# Patient Record
Sex: FEMALE | Race: WHITE | NOT HISPANIC OR LATINO | Employment: FULL TIME | URBAN - METROPOLITAN AREA
[De-identification: names, ages, dates, MRNs, and addresses within clinical notes are randomized per-mention and may not be internally consistent; named-entity substitution may affect disease eponyms.]

---

## 2017-01-10 ENCOUNTER — LAB REQUISITION (OUTPATIENT)
Dept: LAB | Facility: HOSPITAL | Age: 52
End: 2017-01-10
Payer: COMMERCIAL

## 2017-01-10 DIAGNOSIS — J32.8 OTHER CHRONIC SINUSITIS: ICD-10-CM

## 2017-01-10 PROCEDURE — 87205 SMEAR GRAM STAIN: CPT | Performed by: PHYSICIAN ASSISTANT

## 2017-01-10 PROCEDURE — 87070 CULTURE OTHR SPECIMN AEROBIC: CPT | Performed by: PHYSICIAN ASSISTANT

## 2017-01-13 LAB
BACTERIA WND AEROBE CULT: NORMAL
GRAM STN SPEC: NORMAL

## 2018-02-01 ENCOUNTER — OFFICE VISIT (OUTPATIENT)
Dept: INTERNAL MEDICINE CLINIC | Age: 53
End: 2018-02-01
Payer: COMMERCIAL

## 2018-02-01 VITALS
DIASTOLIC BLOOD PRESSURE: 110 MMHG | SYSTOLIC BLOOD PRESSURE: 188 MMHG | HEIGHT: 63 IN | TEMPERATURE: 98.3 F | HEART RATE: 59 BPM | BODY MASS INDEX: 25.48 KG/M2 | OXYGEN SATURATION: 98 % | WEIGHT: 143.8 LBS

## 2018-02-01 DIAGNOSIS — Z12.39 ENCOUNTER FOR OTHER SCREENING FOR MALIGNANT NEOPLASM OF BREAST: ICD-10-CM

## 2018-02-01 DIAGNOSIS — J01.11 ACUTE RECURRENT FRONTAL SINUSITIS: Primary | ICD-10-CM

## 2018-02-01 DIAGNOSIS — I10 HTN (HYPERTENSION), BENIGN: ICD-10-CM

## 2018-02-01 DIAGNOSIS — N76.0 ACUTE VAGINITIS: ICD-10-CM

## 2018-02-01 DIAGNOSIS — Z12.11 ENCOUNTER FOR SCREENING FOR MALIGNANT NEOPLASM OF COLON: ICD-10-CM

## 2018-02-01 DIAGNOSIS — E06.3 HYPOTHYROIDISM DUE TO HASHIMOTO'S THYROIDITIS: ICD-10-CM

## 2018-02-01 DIAGNOSIS — E03.8 HYPOTHYROIDISM DUE TO HASHIMOTO'S THYROIDITIS: ICD-10-CM

## 2018-02-01 DIAGNOSIS — J30.2 CHRONIC SEASONAL ALLERGIC RHINITIS, UNSPECIFIED TRIGGER: ICD-10-CM

## 2018-02-01 PROBLEM — N95.0 POSTMENOPAUSAL BLEEDING: Status: ACTIVE | Noted: 2017-09-20

## 2018-02-01 PROCEDURE — 99204 OFFICE O/P NEW MOD 45 MIN: CPT | Performed by: PHYSICIAN ASSISTANT

## 2018-02-01 RX ORDER — CETIRIZINE HYDROCHLORIDE 10 MG/1
10 TABLET ORAL DAILY
Refills: 0
Start: 2018-02-01 | End: 2019-02-04 | Stop reason: SDUPTHER

## 2018-02-01 RX ORDER — LISINOPRIL 10 MG/1
10 TABLET ORAL DAILY
Qty: 30 TABLET | Refills: 1 | Status: SHIPPED | OUTPATIENT
Start: 2018-02-01 | End: 2018-02-12 | Stop reason: SDUPTHER

## 2018-02-01 RX ORDER — LEVOTHYROXINE SODIUM 0.1 MG/1
100 TABLET ORAL DAILY
Qty: 30 TABLET | Refills: 0 | Status: SHIPPED | OUTPATIENT
Start: 2018-02-01 | End: 2018-03-01 | Stop reason: SDUPTHER

## 2018-02-01 RX ORDER — LEVOTHYROXINE SODIUM 0.1 MG/1
100 TABLET ORAL DAILY
COMMUNITY
Start: 2017-07-18 | End: 2018-02-01 | Stop reason: SDUPTHER

## 2018-02-01 RX ORDER — CLINDAMYCIN PHOSPHATE 10 MG/G
GEL TOPICAL AS NEEDED
COMMUNITY
Start: 2015-07-30 | End: 2021-10-28

## 2018-02-01 RX ORDER — FLUCONAZOLE 150 MG/1
150 TABLET ORAL ONCE
Qty: 2 TABLET | Refills: 0 | Status: SHIPPED | OUTPATIENT
Start: 2018-02-01 | End: 2018-02-01

## 2018-02-01 RX ORDER — ALBUTEROL SULFATE 90 UG/1
2 AEROSOL, METERED RESPIRATORY (INHALATION) AS NEEDED
COMMUNITY
Start: 2018-01-12 | End: 2019-01-12

## 2018-02-01 RX ORDER — FLUTICASONE PROPIONATE 50 MCG
2 SPRAY, SUSPENSION (ML) NASAL AS NEEDED
COMMUNITY
Start: 2018-01-12 | End: 2022-04-19 | Stop reason: SDUPTHER

## 2018-02-01 RX ORDER — SPIRONOLACTONE 25 MG/1
1 TABLET ORAL DAILY
COMMUNITY
Start: 2014-08-06 | End: 2018-05-22 | Stop reason: ALTCHOICE

## 2018-02-01 RX ORDER — SULFAMETHOXAZOLE AND TRIMETHOPRIM 800; 160 MG/1; MG/1
1 TABLET ORAL EVERY 12 HOURS SCHEDULED
Qty: 28 TABLET | Refills: 0 | Status: SHIPPED | OUTPATIENT
Start: 2018-02-01 | End: 2018-02-15

## 2018-02-01 NOTE — PATIENT INSTRUCTIONS
Monitor BP at home - start lisinopril  Labs asap (fasting)  Pt instructed to report any headaches, dizziness, CP/SOB      Sinusitis   AMBULATORY CARE:   Sinusitis  is inflammation or infection of your sinuses  It is most often caused by a virus  Acute sinusitis may last up to 12 weeks  Chronic sinusitis lasts longer than 12 weeks  Recurrent sinusitis means you have 4 or more times in 1 year  Common symptoms include the following:   · Fever    · Pain, pressure, redness, or swelling around the forehead, cheeks, or eyes    · Thick yellow or green discharge from your nose    · Tenderness when you touch your face over your sinuses    · Dry cough that happens mostly at night or when you lie down    · Headache and face pain that is worse when you lean forward    · Tooth pain, or pain when you chew  Seek care immediately if:   · Your eye and eyelid are red, swollen, and painful  · You cannot open your eye  · You have vision changes, such as double vision  · Your eyeball bulges out or you cannot move your eye  · You are more sleepy than normal, or you notice changes in your ability to think, move, or talk  · You have a stiff neck, a fever, or a bad headache  · You have swelling of your forehead or scalp  Contact your healthcare provider if:   · Your symptoms do not improve after 3 days  · Your symptoms do not go away after 10 days  · You have nausea and are vomiting  · Your nose is bleeding  · You have questions or concerns about your condition or care  Treatment for sinusitis:  Your symptoms may go away on their own  Your healthcare provider may recommend watchful waiting for up to 10 days before starting antibiotics  You may  need any of the following:  · Acetaminophen  decreases pain and fever  It is available without a doctor's order  Ask how much to take and how often to take it  Follow directions   Read the labels of all other medicines you are using to see if they also contain acetaminophen, or ask your doctor or pharmacist  Acetaminophen can cause liver damage if not taken correctly  Do not use more than 4 grams (4,000 milligrams) total of acetaminophen in one day  · NSAIDs , such as ibuprofen, help decrease swelling, pain, and fever  This medicine is available with or without a doctor's order  NSAIDs can cause stomach bleeding or kidney problems in certain people  If you take blood thinner medicine, always ask your healthcare provider if NSAIDs are safe for you  Always read the medicine label and follow directions  · Nasal steroid sprays  may help decrease inflammation in your nose and sinuses  · Decongestants  help reduce swelling and drain mucus in the nose and sinuses  They may help you breathe easier  · Antihistamines  help dry mucus in the nose and relieve sneezing  · Antibiotics  help treat or prevent a bacterial infection  · Take your medicine as directed  Contact your healthcare provider if you think your medicine is not helping or if you have side effects  Tell him or her if you are allergic to any medicine  Keep a list of the medicines, vitamins, and herbs you take  Include the amounts, and when and why you take them  Bring the list or the pill bottles to follow-up visits  Carry your medicine list with you in case of an emergency  Self-care:   · Rinse your sinuses  Use a sinus rinse device to rinse your nasal passages with a saline (salt water) solution or distilled water  Do not use tap water  This will help thin the mucus in your nose and rinse away pollen and dirt  It will also help reduce swelling so you can breathe normally  Ask your healthcare provider how often to do this  · Breathe in steam   Heat a bowl of water until you see steam  Lean over the bowl and make a tent over your head with a large towel  Breathe deeply for about 20 minutes  Be careful not to get too close to the steam or burn yourself  Do this 3 times a day   You can also breathe deeply when you take a hot shower  · Sleep with your head elevated  Place an extra pillow under your head before you go to sleep to help your sinuses drain  · Drink liquids as directed  Ask your healthcare provider how much liquid to drink each day and which liquids are best for you  Liquids will thin the mucus in your nose and help it drain  Avoid drinks that contain alcohol or caffeine  · Do not smoke, and avoid secondhand smoke  Nicotine and other chemicals in cigarettes and cigars can make your symptoms worse  Ask your healthcare provider for information if you currently smoke and need help to quit  E-cigarettes or smokeless tobacco still contain nicotine  Talk to your healthcare provider before you use these products  Prevent the spread of germs that cause sinusitis:  Wash your hands often with soap and water  Wash your hands after you use the bathroom, change a child's diaper, or sneeze  Wash your hands before you prepare or eat food  Follow up with your healthcare provider as directed: You may be referred to an ear, nose, and throat specialist  Write down your questions so you remember to ask them during your visits  © 2017 [employer]  Information is for End User's use only and may not be sold, redistributed or otherwise used for commercial purposes  All illustrations and images included in CareNotes® are the copyrighted property of A [employer]  or [employer]  The above information is an  only  It is not intended as medical advice for individual conditions or treatments  Talk to your doctor, nurse or pharmacist before following any medical regimen to see if it is safe and effective for you

## 2018-02-01 NOTE — PROGRESS NOTES
Assessment/Plan:         Diagnoses and all orders for this visit:    Acute recurrent frontal sinusitis  -     sulfamethoxazole-trimethoprim (BACTRIM DS) 800-160 mg per tablet; Take 1 tablet by mouth every 12 (twelve) hours for 14 days  -     CBC and differential; Future  -     CBC and differential    Encounter for screening for malignant neoplasm of colon  -     Ambulatory referral to Colorectal Surgery; Future    Encounter for other screening for malignant neoplasm of breast  -     Mammo screening bilateral w cad; Future    HTN (hypertension), benign  -     lisinopril (ZESTRIL) 10 mg tablet; Take 1 tablet (10 mg total) by mouth daily  -     Comprehensive metabolic panel; Future  -     Lipid panel; Future  -     Cortisol Level, AM Specimen; Future  -     Comprehensive metabolic panel  -     Lipid panel  -     Cortisol Level, AM Specimen    Hypothyroidism due to Hashimoto's thyroiditis  -     levothyroxine 100 mcg tablet; Take 1 tablet (100 mcg total) by mouth daily  -     TSH, 3rd generation; Future  -     TSH, 3rd generation    Acute vaginitis  -     fluconazole (DIFLUCAN) 150 mg tablet; Take 1 tablet (150 mg total) by mouth once for 1 dose    Chronic seasonal allergic rhinitis, unspecified trigger  -     cetirizine (ZyrTEC) 10 mg tablet; Take 1 tablet (10 mg total) by mouth daily    Other orders  -     albuterol (PROVENTIL HFA,VENTOLIN HFA) 90 mcg/act inhaler; Inhale 2 puffs every 6 (six) hours  -     fluticasone (FLONASE) 50 mcg/act nasal spray; 2 sprays into each nostril as needed  -     spironolactone (ALDACTONE) 25 mg tablet; Take 1 tablet by mouth daily  -     Discontinue: levothyroxine 100 mcg tablet; Take 100 mcg by mouth daily  -     clindamycin (CLINDAGEL) 1 % gel; Apply topically as needed  -     tretinoin (RETIN-A) 0 025 % cream; as needed  -     Azelastine HCl (ASTELIN NA); as needed          Subjective:      Patient ID: Peace Landa is a 48 y o  female      Pt has been having problems with elevated BP for some time - very high today  At her last ov with her previous pcp she reports her bp was around 140/90  She has not been on anything for BP control in past however has been on aldactone for years for cystic acne which is prescribed by her dermatologist  Last labs available were in April 2017 and lytes and creatinine were within normal limits  Pt states she drinks 1/2 caf coffee and tries to avoid salt in diet  She does smoke and is aware that she needs to quit to help her bp control and also prevent lung problems in the future      C/o sinus pressure - frontal headaches, ongoing x 1 month - worse over the past 2 days  Pt was told in past not to take decongestants due to high BP   Took mucinex D with some relief   Pt reports she had a ct sinuses in past at [facility] but we don't have this record - we will call for this today   She is tearful talking about this - states she was referred to neurology and or allergist in past but didn't know who to go to so she never went  She has been using flonase NS but has never taken claritin or zyrtec for maintenance meds     Hx of hypothyroid - pt has been on synthroid 100mcg daily for at least the past 6 months however she is taking this in am with her other medications  Last tsh resulted by obgyn was 2 4 (9/17)          The following portions of the patient's history were reviewed and updated as appropriate: allergies, current medications, past family history, past medical history, past social history, past surgical history and problem list     Review of Systems      Past Medical History:   Diagnosis Date    Asthma     Hypertension     Migraine headache     Thyroid disease          Current Outpatient Prescriptions:     Azelastine HCl (ASTELIN NA), as needed, Disp: , Rfl:     clindamycin (CLINDAGEL) 1 % gel, Apply topically as needed, Disp: , Rfl:     fluticasone (FLONASE) 50 mcg/act nasal spray, 2 sprays into each nostril as needed, Disp: , Rfl:    levothyroxine 100 mcg tablet, Take 1 tablet (100 mcg total) by mouth daily, Disp: 30 tablet, Rfl: 0    spironolactone (ALDACTONE) 25 mg tablet, Take 1 tablet by mouth daily, Disp: , Rfl:     tretinoin (RETIN-A) 0 025 % cream, as needed, Disp: , Rfl:     albuterol (PROVENTIL HFA,VENTOLIN HFA) 90 mcg/act inhaler, Inhale 2 puffs every 6 (six) hours, Disp: , Rfl:     cetirizine (ZyrTEC) 10 mg tablet, Take 1 tablet (10 mg total) by mouth daily, Disp: , Rfl: 0    fluconazole (DIFLUCAN) 150 mg tablet, Take 1 tablet (150 mg total) by mouth once for 1 dose, Disp: 2 tablet, Rfl: 0    lisinopril (ZESTRIL) 10 mg tablet, Take 1 tablet (10 mg total) by mouth daily, Disp: 30 tablet, Rfl: 1    sulfamethoxazole-trimethoprim (BACTRIM DS) 800-160 mg per tablet, Take 1 tablet by mouth every 12 (twelve) hours for 14 days, Disp: 28 tablet, Rfl: 0    Allergies   Allergen Reactions    Other      [place]  Dust mites        Social History   Past Surgical History:   Procedure Laterality Date    DILATION AND CURETTAGE OF UTERUS      several    ENDOMETRIAL ABLATION      TUBAL LIGATION  1997    WISDOM TOOTH EXTRACTION  1985     Family History   Problem Relation Age of Onset    Diabetes Mother     Hypertension Mother     Atrial fibrillation Mother     Kidney cancer Mother     Stroke Father     Heart attack Father     Stroke Maternal Grandmother     Diabetes Maternal Grandmother     Stroke Paternal Grandmother     Diabetes Paternal Grandmother        Objective:  BP (!) 188/110 (BP Location: Left arm, Patient Position: Sitting, Cuff Size: Standard)   Pulse 59   Temp 98 3 °F (36 8 °C) (Tympanic)   Ht 5' 3 23" (1 606 m)   Wt 65 2 kg (143 lb 12 8 oz)   SpO2 98%   BMI 25 29 kg/m²        Physical Exam   Constitutional: She is oriented to person, place, and time  She appears well-developed and well-nourished  No distress  HENT:   Head: Normocephalic and atraumatic     Right Ear: External ear normal    Left Ear: External ear normal    Mouth/Throat: No oropharyngeal exudate  Fluid behind b/l TM   Dull light reflex b/l    Eyes: EOM are normal  Pupils are equal, round, and reactive to light  Right eye exhibits no discharge  Neck: Normal range of motion  Neck supple  Cardiovascular: Normal rate, regular rhythm and normal heart sounds  No murmur heard  Pulmonary/Chest: Effort normal and breath sounds normal  No respiratory distress  She has no wheezes  She has no rales  Abdominal: Soft  Bowel sounds are normal    Musculoskeletal: Normal range of motion  She exhibits no edema or deformity  Lymphadenopathy:     She has no cervical adenopathy  Neurological: She is alert and oriented to person, place, and time  No cranial nerve deficit  Skin: Skin is warm and dry  No rash noted  She is not diaphoretic  No erythema  Psychiatric: She has a normal mood and affect  Her behavior is normal    Nursing note and vitals reviewed

## 2018-02-12 ENCOUNTER — OFFICE VISIT (OUTPATIENT)
Dept: INTERNAL MEDICINE CLINIC | Age: 53
End: 2018-02-12
Payer: COMMERCIAL

## 2018-02-12 VITALS
DIASTOLIC BLOOD PRESSURE: 92 MMHG | HEART RATE: 82 BPM | OXYGEN SATURATION: 99 % | WEIGHT: 142.4 LBS | BODY MASS INDEX: 24.31 KG/M2 | HEIGHT: 64 IN | SYSTOLIC BLOOD PRESSURE: 160 MMHG | TEMPERATURE: 98.1 F

## 2018-02-12 DIAGNOSIS — F17.200 TOBACCO USE DISORDER: ICD-10-CM

## 2018-02-12 DIAGNOSIS — E03.9 ACQUIRED HYPOTHYROIDISM: Primary | ICD-10-CM

## 2018-02-12 DIAGNOSIS — I10 ESSENTIAL HYPERTENSION: ICD-10-CM

## 2018-02-12 DIAGNOSIS — I10 HTN (HYPERTENSION), BENIGN: ICD-10-CM

## 2018-02-12 PROCEDURE — 99214 OFFICE O/P EST MOD 30 MIN: CPT | Performed by: NURSE PRACTITIONER

## 2018-02-12 RX ORDER — LISINOPRIL 10 MG/1
20 TABLET ORAL DAILY
Qty: 30 TABLET | Refills: 0 | Status: SHIPPED | OUTPATIENT
Start: 2018-02-12 | End: 2018-03-01 | Stop reason: SDUPTHER

## 2018-02-12 NOTE — PROGRESS NOTES
Assessment/Plan:    HTN:  Will increase your lisinopril to 20mg daily  Would recommend purchasing a new blood pressure cuff as your machine is not accurate  Continue to monitor blood pressure at home  Goal BP is < 140/90  Contact our office for consistent elevations  Recommend low sodium diet  Exercise 30 minutes three times a week as tolerated  Recommend yearly eye exam       You need to stop smoking!! There are many ways to quit smoking  You can start with cutting back a few cigarettes a day and slowly wean yourself off  You can try OTC nicotine patches, gum or lozenges  As discussed there are also some prescription medications you may consider as well  Smoking increases your risk of heart attack and stroke - you need to quit!! You are started on an ACE inhibitor today  As discussed the most common side effect of this medication is a dry consistent cough  This is resolved on own with stopping the medication after 3-4 days  Please contact our office if you develop this cough and we can change your prescription to another hypertensive medication  Additionally an uncommon side effect, but life threatening, is to develop a reaction called angioedema  This is when your face, mouth, and/or tongue begin to swell  As discussed this is a life threatening reaction and you should call 911 immediately  Pt to contact our office in 2 weeks and inform myself (Nile Adam) of your BP as discussed we may need to increase your lisinopril to 40mg daily  Labs reviewed today     Diagnoses and all orders for this visit:    Acquired hypothyroidism    HTN (hypertension), benign  -     lisinopril (ZESTRIL) 10 mg tablet; Take 2 tablets (20 mg total) by mouth daily    Essential hypertension    Tobacco use disorder        Subjective:      Patient ID: Héctor Guaman is a 48 y o  female  Pt is here to follow up on blood pressure and review labs  Pt with no prior diagnosis of HTN previously    States she saw her prior PCP and BP would be slighlty elevated, however felt was secondary to decongestant or stress  She was started on lisinopril on 2/1/18 by Sharona  Hypertension:  Pt is here to follow-up no hypertension  Co-morbidities include hypothyroid, tobacco abuse  Associated symptoms include headaches (currently on bactrim for sinusitis),  Also gets floaters (last seen by opthamologist last year)  Denies CP/SOB  Denies LE edema  Pt is currently taking lisinopril and spiroactone and tolerating well with no side effects  Pt does checks BP at home and gets readings SBP > 160  Of note she brought her machine in today with a reading of 190/97  Compared to manual BP of 160/92  Pt do exercise on routine basis - aerobics, walking  Diet includes fairly well rounded diet  Last eye exam last year  Last labs 01/25/18    Thyroid Problem   Presents for follow-up visit  Symptoms include fatigue  Patient reports no anxiety, cold intolerance, depressed mood, heat intolerance, leg swelling, palpitations, weight gain or weight loss  The following portions of the patient's history were reviewed and updated as appropriate: allergies, current medications, past family history, past medical history, past social history, past surgical history and problem list     Review of Systems   Constitutional: Positive for fatigue  Negative for chills, fever, weight gain and weight loss  HENT: Positive for sinus pressure  Negative for congestion, ear pain and sore throat  Eyes: Positive for visual disturbance  Negative for photophobia and pain  Respiratory: Negative for cough, shortness of breath and wheezing  Cardiovascular: Negative for chest pain and palpitations  Gastrointestinal: Negative for abdominal pain, nausea and vomiting  Endocrine: Negative for cold intolerance and heat intolerance  Neurological: Positive for headaches  Negative for dizziness, speech difficulty and weakness     Psychiatric/Behavioral: Negative for confusion  The patient is not nervous/anxious            Past Medical History:   Diagnosis Date    Asthma     Hypertension     Migraine headache     Thyroid disease          Current Outpatient Prescriptions:     albuterol (PROVENTIL HFA,VENTOLIN HFA) 90 mcg/act inhaler, Inhale 2 puffs every 6 (six) hours, Disp: , Rfl:     cetirizine (ZyrTEC) 10 mg tablet, Take 1 tablet (10 mg total) by mouth daily, Disp: , Rfl: 0    clindamycin (CLINDAGEL) 1 % gel, Apply topically as needed, Disp: , Rfl:     fluticasone (FLONASE) 50 mcg/act nasal spray, 2 sprays into each nostril as needed, Disp: , Rfl:     levothyroxine 100 mcg tablet, Take 1 tablet (100 mcg total) by mouth daily, Disp: 30 tablet, Rfl: 0    lisinopril (ZESTRIL) 10 mg tablet, Take 2 tablets (20 mg total) by mouth daily, Disp: 30 tablet, Rfl: 0    spironolactone (ALDACTONE) 25 mg tablet, Take 1 tablet by mouth daily, Disp: , Rfl:     sulfamethoxazole-trimethoprim (BACTRIM DS) 800-160 mg per tablet, Take 1 tablet by mouth every 12 (twelve) hours for 14 days, Disp: 28 tablet, Rfl: 0    tretinoin (RETIN-A) 0 025 % cream, as needed, Disp: , Rfl:     Azelastine HCl (ASTELIN NA), as needed, Disp: , Rfl:     Allergies   Allergen Reactions    Other      [place]  Dust mites        Social History   Past Surgical History:   Procedure Laterality Date    DILATION AND CURETTAGE OF UTERUS      several    ENDOMETRIAL ABLATION      TUBAL LIGATION  1997    WISDOM TOOTH EXTRACTION  1985     Family History   Problem Relation Age of Onset    Diabetes Mother     Hypertension Mother     Atrial fibrillation Mother     Kidney cancer Mother     Stroke Father     Heart attack Father     Stroke Maternal Grandmother     Diabetes Maternal Grandmother     Stroke Paternal Grandmother     Diabetes Paternal Grandmother        Objective:  /92 (BP Location: Left arm, Patient Position: Sitting, Cuff Size: Standard)   Pulse 82   Temp 98 1 °F (36 7 °C) (Tympanic)   Ht 5' 4" (1 626 m)   Wt 64 6 kg (142 lb 6 4 oz)   SpO2 99%   BMI 24 44 kg/m²      Physical Exam   Constitutional: She is oriented to person, place, and time  She appears well-developed and well-nourished  No distress  Eyes: Pupils are equal, round, and reactive to light  Equal and round   Neck: Neck supple  Cardiovascular: Normal rate, regular rhythm and normal heart sounds  No murmur heard  No pedal edema   Pulmonary/Chest: Effort normal and breath sounds normal  No respiratory distress  She has no wheezes  Musculoskeletal:   MAEx4   Lymphadenopathy:     She has no cervical adenopathy  Neurological: She is alert and oriented to person, place, and time  No focal deficits   Skin: Skin is warm and dry  No rash noted  Psychiatric: She has a normal mood and affect  Her behavior is normal  Judgment and thought content normal    Nursing note and vitals reviewed

## 2018-02-12 NOTE — PATIENT INSTRUCTIONS
HTN:  Will increase your lisinopril to 20mg daily  Would recommend purchasing a new blood pressure cuff as your machine is not accurate  Continue to monitor blood pressure at home  Goal BP is < 140/90  Contact our office for consistent elevations  Recommend low sodium diet  Exercise 30 minutes three times a week as tolerated  Recommend yearly eye exam       You need to stop smoking!! There are many ways to quit smoking  You can start with cutting back a few cigarettes a day and slowly wean yourself off  You can try OTC nicotine patches, gum or lozenges  As discussed there are also some prescription medications you may consider as well  Smoking increases your risk of heart attack and stroke - you need to quit!! You are started on an ACE inhibitor today  As discussed the most common side effect of this medication is a dry consistent cough  This is resolved on own with stopping the medication after 3-4 days  Please contact our office if you develop this cough and we can change your prescription to another hypertensive medication  Additionally an uncommon side effect, but life threatening, is to develop a reaction called angioedema  This is when your face, mouth, and/or tongue begin to swell  As discussed this is a life threatening reaction and you should call 911 immediately  Pt to contact our office in 2 weeks and inform myself (Mukund Wasserman) of your BP as discussed we may need to increase your lisinopril to 40mg daily      Labs reviewed today

## 2018-03-01 DIAGNOSIS — I10 HTN (HYPERTENSION), BENIGN: ICD-10-CM

## 2018-03-01 DIAGNOSIS — E03.8 HYPOTHYROIDISM DUE TO HASHIMOTO'S THYROIDITIS: ICD-10-CM

## 2018-03-01 DIAGNOSIS — E06.3 HYPOTHYROIDISM DUE TO HASHIMOTO'S THYROIDITIS: ICD-10-CM

## 2018-03-01 RX ORDER — LEVOTHYROXINE SODIUM 0.1 MG/1
100 TABLET ORAL DAILY
Qty: 90 TABLET | Refills: 1 | Status: SHIPPED | OUTPATIENT
Start: 2018-03-01 | End: 2018-08-10 | Stop reason: SDUPTHER

## 2018-03-01 RX ORDER — LISINOPRIL 10 MG/1
40 TABLET ORAL DAILY
Qty: 30 TABLET | Refills: 1 | Status: SHIPPED | OUTPATIENT
Start: 2018-03-01 | End: 2018-03-19 | Stop reason: SDUPTHER

## 2018-03-01 NOTE — PROGRESS NOTES
Pt sent message in [employer] - see below  Pt called  BP still elevated  Increased lisinopril to 40mg  rx sent in  She will contact us in 2 weeks, may need to add on BB if persists  No questions  Arielleleila Simpson,     I was supposed to monitor my blood pressure for 2 weeks then contact you   I was increased from 10mg to 20mg   After a couple days into 20 mg I felt better but that only lasted a short time and I continue to be running mostly high, especially right out of bed (???)   I  got a new machine   It is taking my 's accurately which is consistently almost textbook  This my average:   150 98 94 average (but it does include 1 from [employer] thrown in at 125/81/81  - still getting the hang of the machine)     These are some of the readings :   161 102   5:37 am (right out of bed)     142 96 120  7:48 pm  (3rd # is heart)   134 92 113 8:05 pm   134 94  115  7:53 pm    (all same sitting - trying to be sure this thing is being as accurate as possible)     138 95 118     161 110 89   536 am (right out of bed)   162 106 87   500 am (right out of bed)   both different days     141 93 84  8:30 pm     171 102  87  834 pm  this and above back to back (???? nothing different - didnt even move)     166 103  87  1138 am     Prior to getting the new machine I was generally in the 160 (somethings) and 102 (somethings - above are all new machine)  Please let me know what you think   I can check portal and/or call me        Thanks,   [facility]

## 2018-03-19 ENCOUNTER — TELEPHONE (OUTPATIENT)
Dept: INTERNAL MEDICINE CLINIC | Age: 53
End: 2018-03-19

## 2018-03-19 DIAGNOSIS — I10 HTN (HYPERTENSION), BENIGN: ICD-10-CM

## 2018-03-19 RX ORDER — LISINOPRIL 40 MG/1
40 TABLET ORAL DAILY
Qty: 30 TABLET | Refills: 1 | Status: SHIPPED | OUTPATIENT
Start: 2018-03-19 | End: 2018-05-16 | Stop reason: SDUPTHER

## 2018-03-19 NOTE — TELEPHONE ENCOUNTER
Pt is taking 40mg DAILY not 4x a day  Rx resent for 40mg tab daily (previously was on 10mg tab - 4 tabs daily)  Please contact pt and update    THANKS!!!

## 2018-03-19 NOTE — TELEPHONE ENCOUNTER
Tatiana,    Patient is calling to let someone know about the blood pressure prescriptions not being filled correctly to the pharmacy  It was sent with the incorrect amount  Patient is also calling to give the blood pressure readings to someone on how it is doing  The script is stating Lisinopril 10 mg 4 times a day but only giving her a 30 days supply which is not lasting her a month    Can someone please correct this and send the correct on to the pharmacy      Pharmacy [facility] in [address] [place] [address]

## 2018-03-23 NOTE — TELEPHONE ENCOUNTER
Patient received new script for 40mg 1x per day  Last 2 nights-120/80  Seems like it is helping but not sure

## 2018-05-16 DIAGNOSIS — I10 HTN (HYPERTENSION), BENIGN: ICD-10-CM

## 2018-05-16 RX ORDER — LISINOPRIL 40 MG/1
40 TABLET ORAL DAILY
Qty: 30 TABLET | Refills: 5 | Status: SHIPPED | OUTPATIENT
Start: 2018-05-16 | End: 2018-08-10 | Stop reason: SDUPTHER

## 2018-05-22 ENCOUNTER — OFFICE VISIT (OUTPATIENT)
Dept: INTERNAL MEDICINE CLINIC | Age: 53
End: 2018-05-22
Payer: COMMERCIAL

## 2018-05-22 VITALS
DIASTOLIC BLOOD PRESSURE: 78 MMHG | RESPIRATION RATE: 20 BRPM | OXYGEN SATURATION: 99 % | HEIGHT: 63 IN | HEART RATE: 76 BPM | TEMPERATURE: 97.9 F | SYSTOLIC BLOOD PRESSURE: 118 MMHG | WEIGHT: 143.4 LBS | BODY MASS INDEX: 25.41 KG/M2

## 2018-05-22 DIAGNOSIS — I10 ESSENTIAL HYPERTENSION: Primary | ICD-10-CM

## 2018-05-22 DIAGNOSIS — Z12.11 SCREENING FOR COLON CANCER: ICD-10-CM

## 2018-05-22 DIAGNOSIS — R00.2 PALPITATION: ICD-10-CM

## 2018-05-22 DIAGNOSIS — Z12.39 SCREENING FOR BREAST CANCER: ICD-10-CM

## 2018-05-22 PROCEDURE — 93000 ELECTROCARDIOGRAM COMPLETE: CPT | Performed by: NURSE PRACTITIONER

## 2018-05-22 PROCEDURE — 99213 OFFICE O/P EST LOW 20 MIN: CPT | Performed by: NURSE PRACTITIONER

## 2018-05-22 RX ORDER — SPIRONOLACTONE 25 MG/1
25 TABLET ORAL DAILY
Qty: 90 TABLET | Refills: 0 | Status: SHIPPED | OUTPATIENT
Start: 2018-05-22 | End: 2018-08-10 | Stop reason: SDUPTHER

## 2018-05-22 NOTE — PATIENT INSTRUCTIONS
Hypertension:  Your BP is slightly elevated with BP log, controlled at appt today  Will resume spirolactone 25mg in addition to lisinopril 40mg daily  Continue to monitor blood pressure at home  Goal BP is < 140/90  Contact our office for consistent elevations  Recommend low sodium diet  Exercise 30 minutes three times a week as tolerated  Recommend yearly eye exam     Palpitations:  EKG in office was normal, it did shows one PVC this is likely contributing to that feeling you have - this is a normal varriant  Will check holter monitor  Last TSH 02/2018 was normal     You need to stop smoking!! There are many ways to quit smoking  You can start with cutting back a few cigarettes a day and slowly wean yourself off  You can try OTC nicotine patches, gum or lozenges  As discussed there are also some prescription medications you may consider as well    Smoking increases your risk of heart attack and stroke - you need to quit!!

## 2018-05-22 NOTE — PROGRESS NOTES
Assessment/Plan:    Hypertension:  Your BP is slightly elevated with BP log, controlled at appt today  Will resume spirolactone 25mg in addition to lisinopril 40mg daily  Continue to monitor blood pressure at home  Goal BP is < 140/90  Contact our office for consistent elevations  Recommend low sodium diet  Exercise 30 minutes three times a week as tolerated  Recommend yearly eye exam     Palpitations:  EKG in office was normal with one PVC - d/w pt normal variant  This is likely what she is experiencing  Will check holter monitor  Last TSH 02/2018 was normal   Pt with some anxiety and stress which may also be contributing  You need to stop smoking!! There are many ways to quit smoking  You can start with cutting back a few cigarettes a day and slowly wean yourself off  You can try OTC nicotine patches, gum or lozenges  As discussed there are also some prescription medications you may consider as well  Smoking increases your risk of heart attack and stroke - you need to quit!!     Diagnoses and all orders for this visit:    Essential hypertension  -     spironolactone (ALDACTONE) 25 mg tablet; Take 1 tablet (25 mg total) by mouth daily    Palpitation  -     POCT ECG  -     Holter monitor - 48 hour; Future    Screening for colon cancer  -     Ambulatory referral to Gastroenterology; Future    Screening for breast cancer  -     Mammo screening bilateral w cad; Future        Subjective:      Patient ID: Sera Matthews is a 48 y o  female  HPI    Pt presents for a 3 month follow-up on HTN  Pt reports that she is still having some elevation in her BP  Her numbers have improved since increasing to 40mg of lisinopril daily  Readings range 120-140/80-90  Diastolic primarily in mid 90s    Hypertension:    Pt is here to follow-up on hypertension  Co-morbidities include tobacco abuse, hypothyroid    Associated symptoms include blurred vision (no recent changes in vision - last seen by opthamology approx 1 year ago)  Pt is currently taking lisinopril  and tolerating well with no side effects  Pt does checks BP at home and gets readings her numbers have improved since increasing to 40mg of lisinopril daily  Readings range 120-140/80-90  Diastolic primarily in mid 90s   Pt does exercise on routine basis - frequently walking  Diet includes fairly well rounded  Last eye exam last year  Last labs 01/2018  Of note pt was previously on 25mg spirolactone daily due to cystic acne  This was stopped at the time of her lisinopril starting  Pt reports acne has been fairly well controlled  Palpitations:  Pt reports that she has noticed that her HR "skips a beat " for the past week  She will intermittently have these episodes throughout the day  Denies CP, SOB, heart racing, anxiety  Pt states she has also noticed that her BP monitor shows "irregular heart beat"  Last TSH WNL 02/2018    The following portions of the patient's history were reviewed and updated as appropriate: allergies, current medications, past family history, past medical history, past social history, past surgical history and problem list     Review of Systems   Constitutional: Negative for chills, fatigue and fever  HENT: Negative for congestion, ear pain and sore throat  Eyes: Positive for visual disturbance  Respiratory: Negative for cough, chest tightness, shortness of breath and wheezing  Cardiovascular: Positive for palpitations  Negative for chest pain and leg swelling  Gastrointestinal: Negative for abdominal pain, constipation, diarrhea, nausea and vomiting  Skin: Negative for rash  Neurological: Negative for dizziness, syncope, light-headedness and headaches  Psychiatric/Behavioral: Negative for dysphoric mood and sleep disturbance  The patient is not nervous/anxious            Past Medical History:   Diagnosis Date    Asthma     Hypertension     Migraine headache     Thyroid disease          Current Outpatient Prescriptions:     albuterol (PROVENTIL HFA,VENTOLIN HFA) 90 mcg/act inhaler, Inhale 2 puffs as needed  , Disp: , Rfl:     cetirizine (ZyrTEC) 10 mg tablet, Take 1 tablet (10 mg total) by mouth daily (Patient taking differently: Take 10 mg by mouth as needed  ), Disp: , Rfl: 0    clindamycin (CLINDAGEL) 1 % gel, Apply topically as needed, Disp: , Rfl:     fluticasone (FLONASE) 50 mcg/act nasal spray, 2 sprays into each nostril as needed, Disp: , Rfl:     levothyroxine 100 mcg tablet, Take 1 tablet (100 mcg total) by mouth daily for 90 days, Disp: 90 tablet, Rfl: 1    lisinopril (ZESTRIL) 40 mg tablet, Take 1 tablet (40 mg total) by mouth daily, Disp: 30 tablet, Rfl: 5    tretinoin (RETIN-A) 0 025 % cream, Apply 1 application topically as needed  , Disp: , Rfl:     Azelastine HCl (ASTELIN NA), as needed, Disp: , Rfl:     Allergies   Allergen Reactions    Other      [place]  Dust mites        Social History   Past Surgical History:   Procedure Laterality Date    DILATION AND CURETTAGE OF UTERUS      several    ENDOMETRIAL ABLATION      TUBAL LIGATION  1997    WISDOM TOOTH EXTRACTION  1985     Family History   Problem Relation Age of Onset    Diabetes Mother     Hypertension Mother     Atrial fibrillation Mother     Kidney cancer Mother     Stroke Father     Heart attack Father     Stroke Maternal Grandmother     Diabetes Maternal Grandmother     Stroke Paternal Grandmother     Diabetes Paternal Grandmother        Objective:  /78 (BP Location: Left arm, Patient Position: Sitting, Cuff Size: Adult)   Pulse 76   Temp 97 9 °F (36 6 °C) (Tympanic)   Resp 20   Ht 5' 3 35" (1 609 m)   Wt 65 kg (143 lb 6 4 oz)   SpO2 99% Comment: room air  BMI 25 13 kg/m²      Physical Exam   Constitutional: She is oriented to person, place, and time  She appears well-developed and well-nourished  No distress  Neck: Neck supple  No thyromegaly present     Cardiovascular: Normal rate, regular rhythm and normal heart sounds  No murmur heard  No pedal edema   Pulmonary/Chest: Effort normal and breath sounds normal  No respiratory distress  She has no wheezes  Neurological: She is alert and oriented to person, place, and time  No focal deficits   Skin: Skin is warm and dry  No rash noted  Psychiatric: She has a normal mood and affect  Her behavior is normal  Judgment and thought content normal    Nursing note and vitals reviewed  EKG:  Underlying NSR with one PVC

## 2018-06-22 ENCOUNTER — HOSPITAL ENCOUNTER (OUTPATIENT)
Dept: NON INVASIVE DIAGNOSTICS | Facility: HOSPITAL | Age: 53
Discharge: HOME | End: 2018-06-22
Payer: COMMERCIAL

## 2018-06-22 DIAGNOSIS — R00.2 PALPITATION: ICD-10-CM

## 2018-06-22 PROCEDURE — 93225 XTRNL ECG REC<48 HRS REC: CPT

## 2018-06-22 PROCEDURE — 93226 XTRNL ECG REC<48 HR SCAN A/R: CPT

## 2018-06-27 PROCEDURE — 93227 XTRNL ECG REC<48 HR R&I: CPT | Performed by: INTERNAL MEDICINE

## 2018-07-03 ENCOUNTER — OFFICE VISIT (OUTPATIENT)
Dept: INTERNAL MEDICINE CLINIC | Age: 53
End: 2018-07-03
Payer: COMMERCIAL

## 2018-07-03 VITALS
TEMPERATURE: 98.2 F | WEIGHT: 141.8 LBS | HEART RATE: 75 BPM | OXYGEN SATURATION: 98 % | SYSTOLIC BLOOD PRESSURE: 106 MMHG | DIASTOLIC BLOOD PRESSURE: 68 MMHG | BODY MASS INDEX: 25.12 KG/M2 | HEIGHT: 63 IN

## 2018-07-03 DIAGNOSIS — R05.9 COUGH: Primary | ICD-10-CM

## 2018-07-03 PROCEDURE — 99213 OFFICE O/P EST LOW 20 MIN: CPT | Performed by: NURSE PRACTITIONER

## 2018-07-03 RX ORDER — BENZONATATE 200 MG/1
200 CAPSULE ORAL 3 TIMES DAILY PRN
Qty: 30 CAPSULE | Refills: 0 | Status: SHIPPED | OUTPATIENT
Start: 2018-07-03 | End: 2018-08-10

## 2018-07-03 RX ORDER — PROMETHAZINE HYDROCHLORIDE AND CODEINE PHOSPHATE 6.25; 1 MG/5ML; MG/5ML
5 SYRUP ORAL EVERY 4 HOURS PRN
Qty: 120 ML | Refills: 0 | Status: SHIPPED | OUTPATIENT
Start: 2018-07-03 | End: 2018-08-10

## 2018-07-03 RX ORDER — AMOXICILLIN AND CLAVULANATE POTASSIUM 875; 125 MG/1; MG/1
1 TABLET, FILM COATED ORAL EVERY 12 HOURS SCHEDULED
Qty: 14 TABLET | Refills: 0 | Status: CANCELLED | OUTPATIENT
Start: 2018-07-03 | End: 2018-07-10

## 2018-07-03 RX ORDER — PREDNISONE 20 MG/1
40 TABLET ORAL DAILY
Qty: 10 TABLET | Refills: 0 | Status: SHIPPED | OUTPATIENT
Start: 2018-07-03 | End: 2018-07-08

## 2018-07-03 NOTE — ASSESSMENT & PLAN NOTE
Related to postnasal drip, caused by seasonal allergies  Patient is to continue to take Zyrtec, patient is also to continue to use Flonase 2 sprays each nostril daily  Will give patient a short course of prednisone to help open up her lungs and decrease inflammation in her sinuses  We will also give patient Tessalon Perles to use during the day for cough, and promethazine with codeine to use at night for cough  Patient has an albuterol inhaler at home she is to use this when she is having bronchospasm  Discussed with patient that this cough may also be caused by the increase in her lisinopril, will see if her cough improves with these recommendations if not we may need to consider changing patient's blood pressure medication  Illness appears to be viral in nature  Rest and fluids advised  Educated that the course of this illness could be 2-4 weeks  Discussed symptomatic relief, such as warm steam inhalations, tylenol/ibuprofen for fevers and body aches, rest, and drink plenty of fluids  Warm salt gargles for sore throat  Discussed red flag signs to go to the ER, such as chest pain or shortness of breath     Return to the office for reevaluation if symptoms worsen or do not improve in 1-2 weeks

## 2018-07-03 NOTE — PATIENT INSTRUCTIONS
Rest and fluids advised  Educated that the course of this illness could be 2-4 weeks  Discussed symptomatic relief, such as warm steam inhalations, tylenol/ibuprofen for fevers and body aches, rest, and drink plenty of fluids  Warm salt gargles for sore throat  Discussed red flag signs to go to the ER, such as chest pain or shortness of breath     Return to the office for reevaluation if symptoms worsen or do not improve in 1-2 weeks

## 2018-07-03 NOTE — PROGRESS NOTES
Assessment/Plan:    Cough  Related to postnasal drip, caused by seasonal allergies  Patient is to continue to take Zyrtec, patient is also to continue to use Flonase 2 sprays each nostril daily  Will give patient a short course of prednisone to help open up her lungs and decrease inflammation in her sinuses  We will also give patient Tessalon Perles to use during the day for cough, and promethazine with codeine to use at night for cough  Patient has an albuterol inhaler at home she is to use this when she is having bronchospasm  Discussed with patient that this cough may also be caused by the increase in her lisinopril, will see if her cough improves with these recommendations if not we may need to consider changing patient's blood pressure medication  Illness appears to be viral in nature  Rest and fluids advised  Educated that the course of this illness could be 2-4 weeks  Discussed symptomatic relief, such as warm steam inhalations, tylenol/ibuprofen for fevers and body aches, rest, and drink plenty of fluids  Warm salt gargles for sore throat  Discussed red flag signs to go to the ER, such as chest pain or shortness of breath  Return to the office for reevaluation if symptoms worsen or do not improve in 1-2 weeks         Diagnoses and all orders for this visit:    Cough  -     predniSONE 20 mg tablet; Take 2 tablets (40 mg total) by mouth daily for 5 days  -     benzonatate (TESSALON) 200 MG capsule; Take 1 capsule (200 mg total) by mouth 3 (three) times a day as needed for cough  -     promethazine-codeine (PHENERGAN WITH CODEINE) 6 25-10 mg/5 mL syrup; Take 5 mL by mouth every 4 (four) hours as needed for cough Take at bedtime for cough  Other orders  -     Cancel: amoxicillin-clavulanate (AUGMENTIN) 875-125 mg per tablet; Take 1 tablet by mouth every 12 (twelve) hours for 7 days          Subjective:      Patient ID: uGevara Michel is a 48 y o  female      Patient presents today for possible sinus infection  Patient has complaints of dry cough, that she mostly has that night, she does state that she has postnasal drip when she lays down at night  She also feels that she has fluid in her ears for the past 2 weeks  She states that she has chest tightness due to her cough the last 4-5 days  She has been taking Tessalon Perles at night to help with cough with no relief  Patient states that her dry cough originally started somewhere around June,  however has gotten worse over the past 7 days  Sinus Problem   This is a new problem  The current episode started in the past 7 days  The problem has been waxing and waning since onset  There has been no fever  Associated symptoms include congestion and coughing (dry)  Pertinent negatives include no chills, diaphoresis, ear pain, headaches, neck pain, shortness of breath, sinus pressure, sneezing, sore throat or swollen glands  Treatments tried: flonase, and cetirizine  The treatment provided mild relief  The following portions of the patient's history were reviewed and updated as appropriate: allergies, current medications, past family history, past medical history, past social history, past surgical history and problem list     Review of Systems   Constitutional: Negative for activity change, appetite change, chills, diaphoresis and fever  HENT: Positive for congestion and postnasal drip  Negative for ear discharge, ear pain, rhinorrhea, sinus pain, sinus pressure, sneezing and sore throat  Eyes: Negative for pain, discharge, itching and visual disturbance  Respiratory: Positive for cough (dry)  Negative for chest tightness, shortness of breath and wheezing  Cardiovascular: Negative for chest pain, palpitations and leg swelling  Gastrointestinal: Negative for abdominal pain, blood in stool, constipation, diarrhea, nausea and vomiting  Endocrine: Negative for polydipsia, polyphagia and polyuria     Genitourinary: Negative for difficulty urinating, dysuria, hematuria and urgency  Musculoskeletal: Negative for arthralgias, back pain and neck pain  Skin: Negative for rash and wound  Neurological: Negative for dizziness, weakness, numbness and headaches  Past Medical History:   Diagnosis Date    Asthma     Hypertension     Migraine headache     Thyroid disease          Current Outpatient Prescriptions:     albuterol (PROVENTIL HFA,VENTOLIN HFA) 90 mcg/act inhaler, Inhale 2 puffs as needed  , Disp: , Rfl:     Azelastine HCl (ASTELIN NA), as needed, Disp: , Rfl:     benzonatate (TESSALON) 200 MG capsule, Take 1 capsule (200 mg total) by mouth 3 (three) times a day as needed for cough, Disp: 30 capsule, Rfl: 0    cetirizine (ZyrTEC) 10 mg tablet, Take 1 tablet (10 mg total) by mouth daily (Patient taking differently: Take 10 mg by mouth as needed  ), Disp: , Rfl: 0    clindamycin (CLINDAGEL) 1 % gel, Apply topically as needed, Disp: , Rfl:     fluticasone (FLONASE) 50 mcg/act nasal spray, 2 sprays into each nostril as needed, Disp: , Rfl:     levothyroxine 100 mcg tablet, Take 1 tablet (100 mcg total) by mouth daily for 90 days, Disp: 90 tablet, Rfl: 1    lisinopril (ZESTRIL) 40 mg tablet, Take 1 tablet (40 mg total) by mouth daily, Disp: 30 tablet, Rfl: 5    predniSONE 20 mg tablet, Take 2 tablets (40 mg total) by mouth daily for 5 days, Disp: 10 tablet, Rfl: 0    promethazine-codeine (PHENERGAN WITH CODEINE) 6 25-10 mg/5 mL syrup, Take 5 mL by mouth every 4 (four) hours as needed for cough Take at bedtime for cough  , Disp: 120 mL, Rfl: 0    spironolactone (ALDACTONE) 25 mg tablet, Take 1 tablet (25 mg total) by mouth daily, Disp: 90 tablet, Rfl: 0    tretinoin (RETIN-A) 0 025 % cream, Apply 1 application topically as needed  , Disp: , Rfl:     Allergies   Allergen Reactions    Other      [place]  Dust mites        Social History   Past Surgical History:   Procedure Laterality Date    DILATION AND CURETTAGE OF UTERUS      several    ENDOMETRIAL ABLATION      TUBAL LIGATION  1997    WISDOM TOOTH EXTRACTION  1985     Family History   Problem Relation Age of Onset    Diabetes Mother     Hypertension Mother     Atrial fibrillation Mother     Kidney cancer Mother     Stroke Father     Heart attack Father     Stroke Maternal Grandmother     Diabetes Maternal Grandmother     Stroke Paternal Grandmother     Diabetes Paternal Grandmother        Objective:  /68 (BP Location: Left arm, Patient Position: Sitting, Cuff Size: Standard)   Pulse 75   Temp 98 2 °F (36 8 °C) (Tympanic)   Ht 5' 3 11" (1 603 m)   Wt 64 3 kg (141 lb 12 8 oz)   SpO2 98%   BMI 25 03 kg/m²     No results found for this or any previous visit (from the past 1344 hour(s))  Physical Exam   Constitutional: She is oriented to person, place, and time  She appears well-developed and well-nourished  No distress  HENT:   Head: Normocephalic and atraumatic  Right Ear: External ear normal  Tympanic membrane is bulging  Tympanic membrane is not erythematous  A middle ear effusion is present  Left Ear: External ear normal  Tympanic membrane is bulging  Tympanic membrane is not erythematous  A middle ear effusion is present  Nose: No mucosal edema or rhinorrhea  Right sinus exhibits maxillary sinus tenderness and frontal sinus tenderness  Left sinus exhibits maxillary sinus tenderness and frontal sinus tenderness  Mouth/Throat: Oropharynx is clear and moist  Mucous membranes are pale and dry  No oropharyngeal exudate, posterior oropharyngeal edema or posterior oropharyngeal erythema  Eyes: Conjunctivae and EOM are normal  Pupils are equal, round, and reactive to light  Right eye exhibits no discharge  Left eye exhibits no discharge  Neck: Normal range of motion  Neck supple  No thyromegaly present  Cardiovascular: Normal rate, regular rhythm, normal heart sounds and intact distal pulses    Exam reveals no gallop and no friction rub  No murmur heard  Pulmonary/Chest: Effort normal and breath sounds normal  No stridor  No respiratory distress  She has no wheezes  She has no rales  Abdominal: Soft  Bowel sounds are normal  She exhibits no distension  There is no tenderness  Lymphadenopathy:     She has no cervical adenopathy  Neurological: She is alert and oriented to person, place, and time  Skin: Skin is warm and dry  No rash noted  She is not diaphoretic  No erythema  Psychiatric: She has a normal mood and affect   Her behavior is normal  Judgment and thought content normal

## 2018-08-10 ENCOUNTER — CONSULT (OUTPATIENT)
Dept: INTERNAL MEDICINE CLINIC | Age: 53
End: 2018-08-10
Payer: COMMERCIAL

## 2018-08-10 VITALS
BODY MASS INDEX: 25.2 KG/M2 | SYSTOLIC BLOOD PRESSURE: 122 MMHG | OXYGEN SATURATION: 98 % | DIASTOLIC BLOOD PRESSURE: 68 MMHG | HEIGHT: 63 IN | WEIGHT: 142.2 LBS | TEMPERATURE: 98.6 F | HEART RATE: 72 BPM

## 2018-08-10 DIAGNOSIS — E06.3 HYPOTHYROIDISM DUE TO HASHIMOTO'S THYROIDITIS: ICD-10-CM

## 2018-08-10 DIAGNOSIS — I10 ESSENTIAL HYPERTENSION: ICD-10-CM

## 2018-08-10 DIAGNOSIS — I10 HTN (HYPERTENSION), BENIGN: ICD-10-CM

## 2018-08-10 DIAGNOSIS — E03.8 HYPOTHYROIDISM DUE TO HASHIMOTO'S THYROIDITIS: ICD-10-CM

## 2018-08-10 DIAGNOSIS — Z72.0 TOBACCO ABUSE: ICD-10-CM

## 2018-08-10 DIAGNOSIS — Z12.11 SCREENING FOR COLON CANCER: Primary | ICD-10-CM

## 2018-08-10 DIAGNOSIS — R05.3 COUGH, PERSISTENT: ICD-10-CM

## 2018-08-10 PROBLEM — N95.0 POSTMENOPAUSAL BLEEDING: Status: RESOLVED | Noted: 2017-09-20 | Resolved: 2018-08-10

## 2018-08-10 PROCEDURE — 99214 OFFICE O/P EST MOD 30 MIN: CPT | Performed by: NURSE PRACTITIONER

## 2018-08-10 RX ORDER — SPIRONOLACTONE 25 MG/1
25 TABLET ORAL DAILY
Qty: 90 TABLET | Refills: 1 | Status: SHIPPED | OUTPATIENT
Start: 2018-08-10 | End: 2018-08-28 | Stop reason: SDUPTHER

## 2018-08-10 RX ORDER — LISINOPRIL 40 MG/1
40 TABLET ORAL DAILY
Qty: 90 TABLET | Refills: 1 | Status: SHIPPED | OUTPATIENT
Start: 2018-08-10 | End: 2019-02-04 | Stop reason: SDUPTHER

## 2018-08-10 RX ORDER — LEVOTHYROXINE SODIUM 0.1 MG/1
100 TABLET ORAL DAILY
Qty: 90 TABLET | Refills: 1 | Status: SHIPPED | OUTPATIENT
Start: 2018-08-10 | End: 2018-08-28 | Stop reason: SDUPTHER

## 2018-08-10 NOTE — PATIENT INSTRUCTIONS
Will check PFT and CXR  No need for antibiotics  Would recommend changing zyrtec to xyzal    Pt to schedule for pre-op

## 2018-08-10 NOTE — PROGRESS NOTES
Assessment/Plan:    Persistent cough:  Status post antibiotics, prednisone  Patient is currently taking Flonase and Zyrtec as she does have a moderate amount of postnasal drip and the cough is worse with minor morning  Her exam today is unremarkable  No postnasal drip noted  No signs of infection  No need for antibiotics  Likely that she is developing some postnasal drip that is contributing to her cough as well as her smoking  Discussed with patient she does need to stop smoking  Additionally she does have a remote past medical history of asthma  Patient states she has only ever been on albuterol inhaler when she gets sick  She has never been on a maintenance inhaler  Patient reports that she states she was diagnosed with asthma years ago and she is not even sure how she developed a diagnosis  She denies any formal spirometry lung testing  Would recommend a chest x-ray due to persistent cough, current smoker  Additionally will check pulmonary function testing regarding asthma  She may benefit from a daily maintenance inhaler  Additionally recommend that patient changed to Xyzal to provide further relief  Do not feel any patient needs any prescription cough medication at this time as she did not cough once during our exam  recommended Robitussin as needed  Additionally patient was taught proper Flonase use  Pt has completed trial of temporarily being off lisinopril without improvement in cough  May change to ARB if persists  Hypertension:  Stable  Continue lisinopril 40mg daily  Continue to monitor blood pressure at home  Goal BP is < 140/90  Contact our office for consistent elevations  Recommend low sodium diet  Exercise 30 minutes three times a week as tolerated  Recommend yearly eye exam     Hypothyroid:  Stable  Continue levothyroxine 100mcg  Last TSH 02/2018 normal     Follow-up in 6 months or sooner    I will review results of PFT and CXR and may need pt to come back in sooner Diagnoses and all orders for this visit:    Screening for colon cancer  -     Ambulatory referral to Gastroenterology; Future    Hypothyroidism due to Hashimoto's thyroiditis  -     levothyroxine 100 mcg tablet; Take 1 tablet (100 mcg total) by mouth daily for 90 days    HTN (hypertension), benign  -     lisinopril (ZESTRIL) 40 mg tablet; Take 1 tablet (40 mg total) by mouth daily    Essential hypertension  -     spironolactone (ALDACTONE) 25 mg tablet; Take 1 tablet (25 mg total) by mouth daily        Subjective:      Patient ID: Stephen Chadwick is a 48 y o  female  URI    Associated symptoms include congestion, coughing, headaches, rhinorrhea (clear) and sneezing  Pertinent negatives include no abdominal pain, chest pain, diarrhea, ear pain, nausea, plugged ear sensation, sinus pain, sore throat, vomiting or wheezing  She has tried antihistamine and decongestant (antibiotics, steroids) for the symptoms  The treatment provided mild relief  Thyroid Problem   Presents for follow-up visit  Patient reports no anxiety, constipation, depressed mood, diarrhea, dry skin, fatigue, hair loss, palpitations, tremors, weight gain or weight loss  The symptoms have been stable  Pt is currently smoking approx 1/2 pack per day  Hypertension:    Pt is here to follow-up on hypertension  Co-morbidities include tobacco abuse, postmenopausal   Associated symptoms include none  Denies blurred vision, Moore, CP/SOB, LE edema  Pt is currently taking lisinopril and spirolactone and tolerating well with no side effects  Pt does occassionally checks BP at home and gets readings 120-130/70-80  Pt started re-exercising on routine basis - lifting weights, bicycling and walking      The following portions of the patient's history were reviewed and updated as appropriate: allergies, current medications, past family history, past medical history, past social history, past surgical history and problem list     Review of Systems Constitutional: Negative for chills, fatigue, fever, weight gain and weight loss  HENT: Positive for congestion, rhinorrhea (clear) and sneezing  Negative for ear pain, sinus pain and sore throat  Eyes: Negative for visual disturbance  Respiratory: Positive for cough  Negative for chest tightness, shortness of breath and wheezing  Cardiovascular: Negative for chest pain, palpitations and leg swelling  Gastrointestinal: Negative for abdominal pain, constipation, diarrhea, nausea and vomiting  Neurological: Positive for headaches  Negative for dizziness, tremors and light-headedness  Psychiatric/Behavioral: Negative for dysphoric mood  The patient is not nervous/anxious  Past Medical History:   Diagnosis Date    Asthma     Hypertension     Migraine headache     Thyroid disease          Current Outpatient Prescriptions:     albuterol (PROVENTIL HFA,VENTOLIN HFA) 90 mcg/act inhaler, Inhale 2 puffs as needed  , Disp: , Rfl:     cetirizine (ZyrTEC) 10 mg tablet, Take 1 tablet (10 mg total) by mouth daily (Patient taking differently: Take 10 mg by mouth as needed  ), Disp: , Rfl: 0    clindamycin (CLINDAGEL) 1 % gel, Apply topically as needed, Disp: , Rfl:     fluticasone (FLONASE) 50 mcg/act nasal spray, 2 sprays into each nostril as needed, Disp: , Rfl:     levothyroxine 100 mcg tablet, Take 1 tablet (100 mcg total) by mouth daily for 90 days, Disp: 90 tablet, Rfl: 1    lisinopril (ZESTRIL) 40 mg tablet, Take 1 tablet (40 mg total) by mouth daily, Disp: 30 tablet, Rfl: 5    promethazine-codeine (PHENERGAN WITH CODEINE) 6 25-10 mg/5 mL syrup, Take 5 mL by mouth every 4 (four) hours as needed for cough Take at bedtime for cough  , Disp: 120 mL, Rfl: 0    spironolactone (ALDACTONE) 25 mg tablet, Take 1 tablet (25 mg total) by mouth daily, Disp: 90 tablet, Rfl: 0    tretinoin (RETIN-A) 0 025 % cream, Apply 1 application topically as needed  , Disp: , Rfl:     benzonatate (TESSALON) 200 MG capsule, Take 1 capsule (200 mg total) by mouth 3 (three) times a day as needed for cough, Disp: 30 capsule, Rfl: 0    Allergies   Allergen Reactions    Other      [place]  Dust mites        Social History   Past Surgical History:   Procedure Laterality Date    DILATION AND CURETTAGE OF UTERUS      several    ENDOMETRIAL ABLATION      TUBAL LIGATION  1997    WISDOM TOOTH EXTRACTION  1985     Family History   Problem Relation Age of Onset    Diabetes Mother     Hypertension Mother     Atrial fibrillation Mother     Kidney cancer Mother     Stroke Father     Heart attack Father     Stroke Maternal Grandmother     Diabetes Maternal Grandmother     Stroke Paternal Grandmother     Diabetes Paternal Grandmother        Objective:  /68 (BP Location: Left arm, Patient Position: Sitting, Cuff Size: Standard)   Pulse 72   Temp 98 6 °F (37 °C) (Tympanic)   Ht 5' 3 39" (1 61 m)   Wt 64 5 kg (142 lb 3 2 oz)   SpO2 98%   BMI 24 88 kg/m²      Physical Exam   Constitutional: She is oriented to person, place, and time  She appears well-developed and well-nourished  No distress  HENT:   Head: Normocephalic and atraumatic  Right Ear: Hearing, tympanic membrane, external ear and ear canal normal    Left Ear: Hearing, tympanic membrane, external ear and ear canal normal    Nose: Nose normal  No mucosal edema or rhinorrhea  Right sinus exhibits no maxillary sinus tenderness and no frontal sinus tenderness  Left sinus exhibits no maxillary sinus tenderness and no frontal sinus tenderness  Mouth/Throat: Uvula is midline, oropharynx is clear and moist and mucous membranes are normal    Neck: Neck supple  Cardiovascular: Normal rate, regular rhythm and normal heart sounds  No murmur heard  Pulmonary/Chest: Effort normal and breath sounds normal  No respiratory distress  She has no wheezes  She has no rales  Pt did not cough once during appt   Lymphadenopathy:     She has no cervical adenopathy  Neurological: She is alert and oriented to person, place, and time  Skin: Skin is warm and dry  No rash noted  Psychiatric: She has a normal mood and affect  Her behavior is normal  Judgment and thought content normal    Nursing note and vitals reviewed

## 2018-08-20 ENCOUNTER — APPOINTMENT (OUTPATIENT)
Dept: RADIOLOGY | Age: 53
End: 2018-08-20
Payer: COMMERCIAL

## 2018-08-20 DIAGNOSIS — R05.3 COUGH, PERSISTENT: ICD-10-CM

## 2018-08-20 DIAGNOSIS — Z72.0 TOBACCO ABUSE: ICD-10-CM

## 2018-08-20 PROCEDURE — 71046 X-RAY EXAM CHEST 2 VIEWS: CPT

## 2018-08-21 ENCOUNTER — OFFICE VISIT (OUTPATIENT)
Dept: INTERNAL MEDICINE CLINIC | Age: 53
End: 2018-08-21
Payer: COMMERCIAL

## 2018-08-21 VITALS
WEIGHT: 141.2 LBS | SYSTOLIC BLOOD PRESSURE: 117 MMHG | OXYGEN SATURATION: 98 % | HEIGHT: 63 IN | HEART RATE: 74 BPM | TEMPERATURE: 98.2 F | DIASTOLIC BLOOD PRESSURE: 78 MMHG | BODY MASS INDEX: 25.02 KG/M2

## 2018-08-21 DIAGNOSIS — E03.8 HYPOTHYROIDISM DUE TO HASHIMOTO'S THYROIDITIS: ICD-10-CM

## 2018-08-21 DIAGNOSIS — E03.9 ACQUIRED HYPOTHYROIDISM: ICD-10-CM

## 2018-08-21 DIAGNOSIS — Z01.818 PRE-OP EXAM: ICD-10-CM

## 2018-08-21 DIAGNOSIS — I10 ESSENTIAL HYPERTENSION: ICD-10-CM

## 2018-08-21 DIAGNOSIS — E06.3 HYPOTHYROIDISM DUE TO HASHIMOTO'S THYROIDITIS: ICD-10-CM

## 2018-08-21 DIAGNOSIS — M79.672 PAIN IN LEFT FOOT: Primary | ICD-10-CM

## 2018-08-21 PROCEDURE — 99242 OFF/OP CONSLTJ NEW/EST SF 20: CPT | Performed by: NURSE PRACTITIONER

## 2018-08-21 NOTE — PROGRESS NOTES
Subjective:    Yolanda Lindsey is a 48y o  year old male or female who presents to the office today for a preoperative consultation at the request of surgeon Claudia Treviño DPM with [facility] who plans on performing bilateral bunionectomy on September 14, 2018  Phil Galeas Planned anesthesia: IV sedation with anethesia  The patient has the following known anesthesia issues: none  Patients bleeding risk: no recent abnormal bleeding and no use of blood thinners  Patient is able to walk 4 blocks without symptoms  Patient is able to walk up 2 flights of stairs without symptoms  Significant past medical history includes hypothyroidism and currently on levothyroxine 100mcg daily  Patient denies arrythmia, CAD, CAD with hx MI, CAD with recent PCI, CHF, chronic liver disease, acute hepatitis, coagulation delay, primary hypercoagulable state, pulmonary embolism, DVT, use of anticoagulants, diabetes, insulin use, neck osteoarthritis, TMJ osteoarthritis, wears dentures, seizure disorder, CVA, asthma, COPD, SHABBIR, renal disease, obesity, low serum albumin    Tobacco use: Currently daily smoker with 1/2 ppd  About 30 years  Alcohol use: Occasional alcohol use  Maybe 6 drinks per month  Illicit drug use: none    Symptoms:   Easy bleeding: no  Easy bruising: no  Frequent nose bleeds: no  Chest pain: no  Cough: yes - intermittent but she states she has been feeling good in the last few weeks  Dyspnea on exertion:no  Edema: no  Palpitations: intermittent   Wheezing: yes - intermittent, pt states clears with cough    Living situation: Patient lives with her  in a private home  Her  will be caring for her after surgery  shedoes not have post-op concerns       The following portions of the patient's history were reviewed and updated as appropriate: allergies, current medications, past family history, past medical history, past social history, past surgical history and problem list       Hypothyroidism  She states she has been well controlled on levothyroxine 100mcg for about the last 2 years  Last TSH was normal at 1 15  Asymptomatic  Seasonal allergies   Currently on zyrtec and flonase daily  About 1 month ago she was treated with oral steroids and cough medicine  No recent need for abx  Symptoms are recently improving  Today she denies any post nasal drip, rhinorrhea, HA, sinus pain or pressure, or congestion  Benign Essential Hypertension  Condition is well controlled  She is currently on spinonoactibe 25mg daily  She is also on lisinopril 10mg daily  Review of Systems  Review of Systems   Constitutional: Negative for activity change, appetite change, chills, fatigue and fever  HENT: Negative for congestion, ear discharge, ear pain, postnasal drip, rhinorrhea, sinus pain, sinus pressure and sore throat  Eyes: Negative for visual disturbance  Respiratory: Positive for cough (intermittent- smoker and allergies, improving) and wheezing (intermittnet but reports clearing with cough)  Negative for chest tightness and shortness of breath  Cardiovascular: Positive for palpitations (intermittent, less frequent than in past, EKG and Holter normal)  Negative for chest pain and leg swelling  Gastrointestinal: Negative for constipation, diarrhea, nausea and vomiting  Genitourinary: Negative for dysuria  Neurological: Positive for light-headedness (intermittent, onset with sinus symptoms, improving)  Negative for dizziness, syncope and headaches  Hematological: Does not bruise/bleed easily           Past Medical History:   Diagnosis Date    Asthma     Hypertension     Migraine headache      Past Surgical History:   Procedure Laterality Date    DILATION AND CURETTAGE OF UTERUS      several    ENDOMETRIAL ABLATION      TUBAL LIGATION  1997    WISDOM TOOTH EXTRACTION  1985     Social History   Substance Use Topics    Smoking status: Current Every Day Smoker     Packs/day: 0 50     Types: Cigarettes    Smokeless tobacco: Never Used      Comment: about 10 cigarettes a day    Alcohol use Yes      Comment: socially     Family History   Problem Relation Age of Onset    Diabetes Mother     Hypertension Mother     Atrial fibrillation Mother     Kidney cancer Mother     Stroke Father     Heart attack Father     Stroke Maternal Grandmother     Diabetes Maternal Grandmother     Stroke Paternal Grandmother     Diabetes Paternal Grandmother      Other  Current Outpatient Prescriptions   Medication Sig Dispense Refill    albuterol (PROVENTIL HFA,VENTOLIN HFA) 90 mcg/act inhaler Inhale 2 puffs as needed        cetirizine (ZyrTEC) 10 mg tablet Take 1 tablet (10 mg total) by mouth daily (Patient taking differently: Take 10 mg by mouth as needed  )  0    clindamycin (CLINDAGEL) 1 % gel Apply topically as needed      fluticasone (FLONASE) 50 mcg/act nasal spray 2 sprays into each nostril as needed      levothyroxine 100 mcg tablet Take 1 tablet (100 mcg total) by mouth daily for 90 days 90 tablet 1    lisinopril (ZESTRIL) 40 mg tablet Take 1 tablet (40 mg total) by mouth daily 90 tablet 1    spironolactone (ALDACTONE) 25 mg tablet Take 1 tablet (25 mg total) by mouth daily 90 tablet 1    tretinoin (RETIN-A) 0 025 % cream Apply 1 application topically as needed         No current facility-administered medications for this visit  Objective:       /78 (BP Location: Left arm, Patient Position: Standing, Cuff Size: Standard)   Pulse 74   Temp 98 2 °F (36 8 °C) (Tympanic)   Ht 5' 3 19" (1 605 m)   Wt 64 kg (141 lb 3 2 oz)   SpO2 98%   BMI 24 86 kg/m²   Physical Exam   Constitutional: She is oriented to person, place, and time  She appears well-developed and well-nourished  No distress  HENT:   Head: Normocephalic and atraumatic     Right Ear: Tympanic membrane and external ear normal    Left Ear: Tympanic membrane and external ear normal    Nose: Mucosal edema (boggy nasal turbinates bilaterally) and rhinorrhea present  Right sinus exhibits no maxillary sinus tenderness and no frontal sinus tenderness  Left sinus exhibits no maxillary sinus tenderness and no frontal sinus tenderness  Mouth/Throat: Oropharynx is clear and moist and mucous membranes are normal  No oropharyngeal exudate, posterior oropharyngeal edema or posterior oropharyngeal erythema  Eyes: Conjunctivae and EOM are normal  Pupils are equal, round, and reactive to light  Right eye exhibits no discharge  Left eye exhibits no discharge  Neck: Normal range of motion  Neck supple  Carotid bruit is not present  No thyromegaly present  Cardiovascular: Normal rate, regular rhythm and normal heart sounds  No murmur heard  Pulmonary/Chest: Effort normal and breath sounds normal  No respiratory distress  She has no decreased breath sounds  She has no wheezes  She has no rhonchi  Musculoskeletal: She exhibits no edema  Lymphadenopathy:     She has no cervical adenopathy  Neurological: She is alert and oriented to person, place, and time  Skin: Skin is warm and dry  She is not diaphoretic  Psychiatric: She has a normal mood and affect  Her behavior is normal    Vitals reviewed  Cardiographics  ECG: Reviewed from 5/22/2018  Normal sinus rhythm with 1 PVC  Imaging  Chest x-ray: Completed 8/20/2018, results pending    Lab Review   No recent labs  Patient sent for CBC, BMP, and TSH with reflex free t4    Assessment:     48 y o  female with planned surgery as above  Known risk factors for perioperative complications: smoker      Cardiac Risk Estimation: low    Yu Cristina's surgical clearance is pending until review of current lab work and chest xray  She is at a low risk from a cardiovascular standpoint at this time without any additional cardiac testing  Reevaluation needed if he should present with symptoms prior to surgery      Plan:  Preoperative workup as follows CBC, BMP TSH with reflex free t4  Change in medication regimen before surgery: take levothyroxine, can hold all other medication morning of surgery      Addendum:  An Rivas is not medically cleared for surgery  CXR negative  Upon review of labs, her TSH is low at 0 31, creatinine elevated at 1 20, GFR low at 52, and potassium elevated at 5 4  Patient is currently on levothyroxine 100mcg daily, lisinopril 40mg daily and spironolactone 25mg daily  Discussed case with Dr Phil Norman  I will reduce her levothyroxine to 88mcg daily, continue lisinopril 40mg daily and reduce spironolactone to 12 5 mg daily  Advised low potassium diet  Will recheck labs the week of Oct 22  And have pt follow up in our office at that time  Patient was made aware of this plan, and the recommendation to postpone surgery until chronic conditions are optimized for this elective surgery     Karen Damon, AJAY 08/28/2018 1:32 pm

## 2018-08-21 NOTE — PATIENT INSTRUCTIONS
Recommend only taking levothyroxine prior to surgery  Notify the office of any changes in health prior to surgery  Go for fasting labs  I will review labs and chest xray when results are available  If no changes need to be made, I will send consult clearance to surgeons office  We will notify you if you are not cleared

## 2018-08-28 ENCOUNTER — TELEPHONE (OUTPATIENT)
Dept: INTERNAL MEDICINE CLINIC | Facility: CLINIC | Age: 53
End: 2018-08-28

## 2018-08-28 RX ORDER — SPIRONOLACTONE 25 MG/1
12.5 TABLET ORAL DAILY
Qty: 90 TABLET | Refills: 0
Start: 2018-08-28 | End: 2018-10-24

## 2018-08-28 RX ORDER — LEVOTHYROXINE SODIUM 88 UG/1
88 TABLET ORAL DAILY
Qty: 30 TABLET | Refills: 2 | Status: SHIPPED | OUTPATIENT
Start: 2018-08-28 | End: 2018-10-24 | Stop reason: SDUPTHER

## 2018-08-28 NOTE — TELEPHONE ENCOUNTER
Patient called the office to speak to me regarding not clearing her for surgery  She stated that she spoke to family members who are in the medical field and that my reasons for not clearing her are not reasons to not do surgery  She wanted to know the specific results of her test to tell her family  She then asked multiple times "if this was a nonelective surgery, would you clear me?" "so your saying if this wasn't elective and I needed to have it done I would do fine"     I explained that typically we are not asked to give input on nonelective surgeries  I explained that her surgery is elective and my responsibility is to let the surgeon know if her chronic conditions and health are optimized for surgery  I explained that on her lab work there were 3 new concerning findings that required 2 medication adjustments  I explained that we should have time to re-evaluate her on the new medication doses to ensure her BP remains stable and her thyroid function is normal before going into surgery  I would also like to see her potassium normalize and renal function improve  I explained that medical input from family members would not change my decision and I have already reviewed this case with dr kovacs, a physician in our practice   I also explained that if she had further concerns I would recommend her reach out to her podiatrist

## 2018-09-10 ENCOUNTER — TELEPHONE (OUTPATIENT)
Dept: INTERNAL MEDICINE CLINIC | Age: 53
End: 2018-09-10

## 2018-09-10 NOTE — TELEPHONE ENCOUNTER
Addendum   Please let pt know she can go after sept 18th for repeat labs and have her schedule follow up a few days after labs completed  (D/w dr Rosita Silver and she said 4 weeks should be okay  Please let know pt can  lab scripts in office)      I discussed this with the patient over the phone already twice  The labs are already ordered  Active labs from 8/28  I advised her to go in 8 weeks, around the week of Oct 23rd as it usually takes about 8 weeks to see a true change in thyroid levels after changing her medication  After the labs are resulted we can see her again for follow up  Please call pt and advise       Thanks  Letty Vigil

## 2018-09-10 NOTE — TELEPHONE ENCOUNTER
Needs to have lab work redone from previous visit now that medication and changes have been adjusted  Wants to have all of this submitted by the end of September  Her last surgery was cancelled due to abnormal lab results  Need thyroid test and blood work done for her creatine and potassium levels  Same tests as last time  Wants to know when lab order is placed so they can go in and get that done  Her medication has been changed for about a week  Wants to know when she can go get labs redone and then when her next visit should be to get her cleared for surgery  Her insurance expires on November 30th and needs to get this done before the end of October  Patient is not making a lot of sense  Would like a call back on what she needs to get done      [phone]

## 2018-09-10 NOTE — TELEPHONE ENCOUNTER
Spoke to patient  She is scheduled for a Pre-Op exam on 10/24/18 with Dr Shasha Serrano and will have labs done prior to appt  She does not have to get labs in September  Surgery will be in November  Patient requested labs orders be mailed to her

## 2018-09-10 NOTE — TELEPHONE ENCOUNTER
Will forward to Colin Raza as she spoke with this patient in length about pre-op clearance, it appears the bloodwork Colin Raza wanted for follow-up has already been ordered

## 2018-10-19 ENCOUNTER — TRANSCRIBE ORDERS (OUTPATIENT)
Dept: ADMINISTRATIVE | Age: 53
End: 2018-10-19

## 2018-10-19 ENCOUNTER — APPOINTMENT (OUTPATIENT)
Dept: LAB | Age: 53
End: 2018-10-19
Payer: COMMERCIAL

## 2018-10-19 DIAGNOSIS — E03.8 HYPOTHYROIDISM DUE TO HASHIMOTO'S THYROIDITIS: ICD-10-CM

## 2018-10-19 DIAGNOSIS — E06.3 HYPOTHYROIDISM DUE TO HASHIMOTO'S THYROIDITIS: ICD-10-CM

## 2018-10-19 DIAGNOSIS — I10 ESSENTIAL HYPERTENSION: ICD-10-CM

## 2018-10-19 LAB
ANION GAP SERPL CALCULATED.3IONS-SCNC: 4 MMOL/L (ref 4–13)
BASOPHILS # BLD AUTO: 0.06 THOUSANDS/ΜL (ref 0–0.1)
BASOPHILS NFR BLD AUTO: 1 % (ref 0–1)
BUN SERPL-MCNC: 20 MG/DL (ref 5–25)
CALCIUM SERPL-MCNC: 8.5 MG/DL (ref 8.3–10.1)
CHLORIDE SERPL-SCNC: 101 MMOL/L (ref 100–108)
CO2 SERPL-SCNC: 27 MMOL/L (ref 21–32)
CREAT SERPL-MCNC: 1.02 MG/DL (ref 0.6–1.3)
EOSINOPHIL # BLD AUTO: 0.17 THOUSAND/ΜL (ref 0–0.61)
EOSINOPHIL NFR BLD AUTO: 2 % (ref 0–6)
ERYTHROCYTE [DISTWIDTH] IN BLOOD BY AUTOMATED COUNT: 11.7 % (ref 11.6–15.1)
GFR SERPL CREATININE-BSD FRML MDRD: 64 ML/MIN/1.73SQ M
GLUCOSE P FAST SERPL-MCNC: 90 MG/DL (ref 65–99)
HCT VFR BLD AUTO: 40.2 % (ref 34.8–46.1)
HGB BLD-MCNC: 13.9 G/DL (ref 11.5–15.4)
IMM GRANULOCYTES # BLD AUTO: 0.03 THOUSAND/UL (ref 0–0.2)
IMM GRANULOCYTES NFR BLD AUTO: 0 % (ref 0–2)
LYMPHOCYTES # BLD AUTO: 1.62 THOUSANDS/ΜL (ref 0.6–4.47)
LYMPHOCYTES NFR BLD AUTO: 16 % (ref 14–44)
MCH RBC QN AUTO: 31.9 PG (ref 26.8–34.3)
MCHC RBC AUTO-ENTMCNC: 34.6 G/DL (ref 31.4–37.4)
MCV RBC AUTO: 92 FL (ref 82–98)
MONOCYTES # BLD AUTO: 0.69 THOUSAND/ΜL (ref 0.17–1.22)
MONOCYTES NFR BLD AUTO: 7 % (ref 4–12)
NEUTROPHILS # BLD AUTO: 7.42 THOUSANDS/ΜL (ref 1.85–7.62)
NEUTS SEG NFR BLD AUTO: 74 % (ref 43–75)
NRBC BLD AUTO-RTO: 0 /100 WBCS
PLATELET # BLD AUTO: 395 THOUSANDS/UL (ref 149–390)
PMV BLD AUTO: 9.1 FL (ref 8.9–12.7)
POTASSIUM SERPL-SCNC: 4.3 MMOL/L (ref 3.5–5.3)
RBC # BLD AUTO: 4.36 MILLION/UL (ref 3.81–5.12)
SODIUM SERPL-SCNC: 132 MMOL/L (ref 136–145)
T4 FREE SERPL-MCNC: 1.02 NG/DL (ref 0.76–1.46)
TSH SERPL DL<=0.05 MIU/L-ACNC: 1.42 UIU/ML (ref 0.36–3.74)
WBC # BLD AUTO: 9.99 THOUSAND/UL (ref 4.31–10.16)

## 2018-10-19 PROCEDURE — 84443 ASSAY THYROID STIM HORMONE: CPT

## 2018-10-19 PROCEDURE — 85025 COMPLETE CBC W/AUTO DIFF WBC: CPT

## 2018-10-19 PROCEDURE — 84439 ASSAY OF FREE THYROXINE: CPT

## 2018-10-19 PROCEDURE — 80048 BASIC METABOLIC PNL TOTAL CA: CPT

## 2018-10-19 PROCEDURE — 36415 COLL VENOUS BLD VENIPUNCTURE: CPT

## 2018-10-24 ENCOUNTER — CONSULT (OUTPATIENT)
Dept: INTERNAL MEDICINE CLINIC | Facility: CLINIC | Age: 53
End: 2018-10-24
Payer: COMMERCIAL

## 2018-10-24 VITALS
OXYGEN SATURATION: 98 % | SYSTOLIC BLOOD PRESSURE: 130 MMHG | TEMPERATURE: 98.2 F | BODY MASS INDEX: 25.09 KG/M2 | WEIGHT: 141.6 LBS | DIASTOLIC BLOOD PRESSURE: 84 MMHG | HEIGHT: 63 IN | HEART RATE: 79 BPM

## 2018-10-24 DIAGNOSIS — R10.819 SUPRAPUBIC TENDERNESS: Primary | ICD-10-CM

## 2018-10-24 DIAGNOSIS — R31.29 OTHER MICROSCOPIC HEMATURIA: ICD-10-CM

## 2018-10-24 DIAGNOSIS — E06.3 HYPOTHYROIDISM DUE TO HASHIMOTO'S THYROIDITIS: ICD-10-CM

## 2018-10-24 DIAGNOSIS — I10 ESSENTIAL HYPERTENSION: ICD-10-CM

## 2018-10-24 DIAGNOSIS — E03.8 HYPOTHYROIDISM DUE TO HASHIMOTO'S THYROIDITIS: ICD-10-CM

## 2018-10-24 DIAGNOSIS — Z01.818 PRE-OP EXAM: ICD-10-CM

## 2018-10-24 LAB
SL AMB  POCT GLUCOSE, UA: ABNORMAL
SL AMB LEUKOCYTE ESTERASE,UA: ABNORMAL
SL AMB POCT BILIRUBIN,UA: ABNORMAL
SL AMB POCT BLOOD,UA: ABNORMAL
SL AMB POCT CLARITY,UA: CLEAR
SL AMB POCT COLOR,UA: ABNORMAL
SL AMB POCT KETONES,UA: ABNORMAL
SL AMB POCT NITRITE,UA: ABNORMAL
SL AMB POCT PH,UA: 6.5
SL AMB POCT SPECIFIC GRAVITY,UA: 1.01
SL AMB POCT URINE PROTEIN: ABNORMAL
SL AMB POCT UROBILINOGEN: 0.2

## 2018-10-24 PROCEDURE — 99242 OFF/OP CONSLTJ NEW/EST SF 20: CPT | Performed by: INTERNAL MEDICINE

## 2018-10-24 PROCEDURE — 81003 URINALYSIS AUTO W/O SCOPE: CPT | Performed by: INTERNAL MEDICINE

## 2018-10-24 RX ORDER — AMLODIPINE BESYLATE 5 MG/1
5 TABLET ORAL DAILY
Qty: 30 TABLET | Refills: 3 | Status: SHIPPED | OUTPATIENT
Start: 2018-10-24 | End: 2019-02-04 | Stop reason: SDUPTHER

## 2018-10-24 RX ORDER — LEVOTHYROXINE SODIUM 88 UG/1
88 TABLET ORAL DAILY
Qty: 30 TABLET | Refills: 3 | Status: SHIPPED | OUTPATIENT
Start: 2018-10-24 | End: 2019-02-04 | Stop reason: SDUPTHER

## 2018-10-24 NOTE — ASSESSMENT & PLAN NOTE
Given history of recurrent hematuria and suprapubic tenderness, will postpone bunion surgery at this time  Once workup is complete and hopefully negative, will then perform preoperative examination for bunion surgery

## 2018-10-24 NOTE — ASSESSMENT & PLAN NOTE
Given recurrent hematuria, history of tobacco use, and negative urine cultures as well as vaginal studies, will refer to Urology for further evaluation  Will also obtain a renal ultrasound for further evaluation  Encourage to drink plenty of water for hydration and flushing

## 2018-10-24 NOTE — ASSESSMENT & PLAN NOTE
Plan will be to discontinue spironolactone and start amlodipine 5 mg daily  Continue with lisinopril 40 mg daily  Recheck in 3 months

## 2018-10-25 ENCOUNTER — TELEPHONE (OUTPATIENT)
Dept: UROLOGY | Facility: CLINIC | Age: 53
End: 2018-10-25

## 2018-10-25 NOTE — TELEPHONE ENCOUNTER
New patient for microhematuria and suprapubic discomfort  Reason for appointment/Complaint/Diagnosis : microhematuria and suprapubic    Insurance: [employer] and [employer]    History of Cancer? no                       If yes, what kind? none    Previous urologist?     no                  Records requested/where? [address]    Outside testing/where? [facility] and [facility]    Location Preference for office visit? [place]    New patient paperwork sent via mail

## 2018-10-26 ENCOUNTER — HOSPITAL ENCOUNTER (OUTPATIENT)
Dept: RADIOLOGY | Age: 53
Discharge: HOME | End: 2018-10-26
Payer: COMMERCIAL

## 2018-10-26 DIAGNOSIS — R31.29 OTHER MICROSCOPIC HEMATURIA: ICD-10-CM

## 2018-10-26 DIAGNOSIS — R10.819 SUPRAPUBIC TENDERNESS: ICD-10-CM

## 2018-10-26 PROCEDURE — 76770 US EXAM ABDO BACK WALL COMP: CPT

## 2018-11-06 ENCOUNTER — OFFICE VISIT (OUTPATIENT)
Dept: UROLOGY | Facility: CLINIC | Age: 53
End: 2018-11-06
Payer: COMMERCIAL

## 2018-11-06 VITALS
SYSTOLIC BLOOD PRESSURE: 130 MMHG | WEIGHT: 141 LBS | HEART RATE: 84 BPM | BODY MASS INDEX: 24.98 KG/M2 | HEIGHT: 63 IN | DIASTOLIC BLOOD PRESSURE: 84 MMHG

## 2018-11-06 DIAGNOSIS — R31.29 MICROSCOPIC HEMATURIA: Primary | ICD-10-CM

## 2018-11-06 DIAGNOSIS — R10.819 SUPRAPUBIC TENDERNESS: ICD-10-CM

## 2018-11-06 DIAGNOSIS — R31.29 OTHER MICROSCOPIC HEMATURIA: ICD-10-CM

## 2018-11-06 LAB
SL AMB  POCT GLUCOSE, UA: NORMAL
SL AMB LEUKOCYTE ESTERASE,UA: NORMAL
SL AMB POCT BILIRUBIN,UA: NORMAL
SL AMB POCT BLOOD,UA: NORMAL
SL AMB POCT CLARITY,UA: CLEAR
SL AMB POCT COLOR,UA: YELLOW
SL AMB POCT KETONES,UA: NORMAL
SL AMB POCT NITRITE,UA: NORMAL
SL AMB POCT PH,UA: 7
SL AMB POCT SPECIFIC GRAVITY,UA: 1
SL AMB POCT URINE PROTEIN: NORMAL
SL AMB POCT UROBILINOGEN: NORMAL

## 2018-11-06 PROCEDURE — 99243 OFF/OP CNSLTJ NEW/EST LOW 30: CPT | Performed by: PHYSICIAN ASSISTANT

## 2018-11-06 PROCEDURE — 88112 CYTOPATH CELL ENHANCE TECH: CPT | Performed by: PATHOLOGY

## 2018-11-06 PROCEDURE — 81002 URINALYSIS NONAUTO W/O SCOPE: CPT | Performed by: PHYSICIAN ASSISTANT

## 2018-11-06 NOTE — PROGRESS NOTES
UROLOGY NEW PATIENT  NOTE     Patient Identifiers: Stefan Banks (MRN: [id number])    Service Providing Consultation:  Urology, Jen Roberts PA-C  Consults  Date of Service: 11/6/2018        History of Present Illness:     Stefan Banks is a 48 y o  Female referred for evaluation of lower urinary tract symptoms and micro hematuria  She had pressure upon voiding and was placed on antibiotic  She had persistent symptoms and saw her GYN  Her urine culture was apparently negative  I do not have results of any positive urinalysis  Her urine today is clear  There is no microscopic blood and had her symptoms have subsided  She is a smoker  She had 2 children  She does not have any significant incontinence and does not get UTIs by rule    She had a kidney and bladder ultrasound which was unremarkable with insignificant postvoid residual     Past Medical, Past Surgical History:     Past Medical History:   Diagnosis Date    Asthma     Disease of thyroid gland     Hypertension     Migraine headache    :    Past Surgical History:   Procedure Laterality Date    DILATION AND CURETTAGE OF UTERUS      several    ENDOMETRIAL ABLATION      TUBAL LIGATION  1997    WISDOM TOOTH EXTRACTION  1985   :    Medications, Allergies:     Current Outpatient Prescriptions:     amLODIPine (NORVASC) 5 mg tablet, Take 1 tablet (5 mg total) by mouth daily, Disp: 30 tablet, Rfl: 3    cetirizine (ZyrTEC) 10 mg tablet, Take 1 tablet (10 mg total) by mouth daily (Patient taking differently: Take 10 mg by mouth as needed  ), Disp: , Rfl: 0    clindamycin (CLINDAGEL) 1 % gel, Apply topically as needed, Disp: , Rfl:     fluticasone (FLONASE) 50 mcg/act nasal spray, 2 sprays into each nostril as needed, Disp: , Rfl:     levothyroxine 88 mcg tablet, Take 1 tablet (88 mcg total) by mouth daily for 30 days, Disp: 30 tablet, Rfl: 3    lisinopril (ZESTRIL) 40 mg tablet, Take 1 tablet (40 mg total) by mouth daily, Disp: 90 tablet, Rfl: 1    tretinoin (RETIN-A) 0 025 % cream, Apply 1 application topically as needed  , Disp: , Rfl:     albuterol (PROVENTIL HFA,VENTOLIN HFA) 90 mcg/act inhaler, Inhale 2 puffs as needed  , Disp: , Rfl:     Allergies: Allergies   Allergen Reactions    Other      [place]  Dust mites    :    Social and Family History:   Social History:   Social History   Substance Use Topics    Smoking status: Current Every Day Smoker     Packs/day: 0 50     Types: Cigarettes    Smokeless tobacco: Never Used      Comment: about 10 cigarettes a day    Alcohol use Yes      Comment: socially     History   Smoking Status    Current Every Day Smoker    Packs/day: 0 50    Types: Cigarettes   Smokeless Tobacco    Never Used     Comment: about 10 cigarettes a day       Family History:  Family History   Problem Relation Age of Onset    Diabetes Mother     Hypertension Mother     Atrial fibrillation Mother     Kidney cancer Mother     Stroke Father     Heart attack Father     Stroke Maternal Grandmother     Diabetes Maternal Grandmother     Stroke Paternal Grandmother     Diabetes Paternal Grandmother    :     Review of Systems:     General: Fever, chills, or night sweats: negative  Cardiac: Negative for chest pain  Pulmonary: Negative for shortness of breath  Gastrointestinal: Abdominal pain negative  Nausea, vomiting, or diarrhea negative,  Genitourinary: See HPI above  Patient does not have hematuria  All other systems queried were negative  Physical Exam:   General: Patient is pleasant and in NAD  Awake and alert  /84 (BP Location: Left arm, Patient Position: Sitting, Cuff Size: Adult)   Pulse 84   Ht 5' 3" (1 6 m)   Wt 64 kg (141 lb)   LMP  (Within Years) Comment: June 2014  Breastfeeding? No   BMI 24 98 kg/m²   Cardiac: Peripheral edema: negative  Pulmonary: Non-labored breathing  Abdomen: Soft, non-tender, non-distended  No surgical scars  No masses, tenderness, hernias noted  Genitourinary: Negative CVA tenderness, negative suprapubic tenderness  (FeMale):Pelvic: external genitalia normal, urethra without abnormality or discharge, vagina normal without discharge, no bladder tenderness, rectovaginal septum normal,   No caruncle, no vaginal atrophy and no cystocele and urethral meatus    Labs:     Lab Results   Component Value Date    HGB 13 9 10/19/2018    HCT 40 2 10/19/2018    WBC 9 99 10/19/2018     (H) 10/19/2018   ]    Lab Results   Component Value Date    K 4 3 10/19/2018     10/19/2018    CO2 27 10/19/2018    BUN 20 10/19/2018    CREATININE 1 02 10/19/2018    CALCIUM 8 5 10/19/2018   ]    Imaging:   I personally reviewed the images and report of the following studies, and reviewed them with the patient:  RENAL ULTRASOUND  IMPRESSION:  Normal kidneys  Normal urinary bladder   Insignificant post void bladder residual       ASSESSMENT:      1  Microscopic hematuria    PLAN:   - options of management reviewed patient  - will send off a urine for cytology  - follow-up in 3 months  - recheck her urine at that time and consider cystoscopy with retrograde pyelogram      Thank you for allowing me to participate in this patients care  Please do not hesitate to call with any additional questions    Maya Granado PA-C

## 2018-11-15 ENCOUNTER — TELEPHONE (OUTPATIENT)
Dept: UROLOGY | Facility: CLINIC | Age: 53
End: 2018-11-15

## 2018-11-15 NOTE — TELEPHONE ENCOUNTER
Per Ca Tanner PA-C I called pt to inform her that her urine cytology test results came back and were negative for cancer cells  Pt is to follow up in Feb 2019  Pt is aware and was instructed to call with any future questions

## 2019-02-04 ENCOUNTER — OFFICE VISIT (OUTPATIENT)
Dept: INTERNAL MEDICINE CLINIC | Facility: CLINIC | Age: 54
End: 2019-02-04
Payer: COMMERCIAL

## 2019-02-04 VITALS
WEIGHT: 144.4 LBS | HEART RATE: 65 BPM | BODY MASS INDEX: 25.59 KG/M2 | DIASTOLIC BLOOD PRESSURE: 78 MMHG | HEIGHT: 63 IN | OXYGEN SATURATION: 98 % | SYSTOLIC BLOOD PRESSURE: 120 MMHG | TEMPERATURE: 98 F

## 2019-02-04 DIAGNOSIS — Z12.11 ENCOUNTER FOR COLORECTAL CANCER SCREENING: ICD-10-CM

## 2019-02-04 DIAGNOSIS — Z12.39 SCREENING FOR BREAST CANCER: ICD-10-CM

## 2019-02-04 DIAGNOSIS — E06.3 HYPOTHYROIDISM DUE TO HASHIMOTO'S THYROIDITIS: ICD-10-CM

## 2019-02-04 DIAGNOSIS — I10 HTN (HYPERTENSION), BENIGN: ICD-10-CM

## 2019-02-04 DIAGNOSIS — Z12.11 SCREENING FOR COLON CANCER: Primary | ICD-10-CM

## 2019-02-04 DIAGNOSIS — Z12.12 ENCOUNTER FOR COLORECTAL CANCER SCREENING: ICD-10-CM

## 2019-02-04 DIAGNOSIS — I10 ESSENTIAL HYPERTENSION: ICD-10-CM

## 2019-02-04 DIAGNOSIS — Z91.09 ENVIRONMENTAL ALLERGIES: ICD-10-CM

## 2019-02-04 DIAGNOSIS — D75.839 THROMBOCYTOSIS: ICD-10-CM

## 2019-02-04 DIAGNOSIS — R31.29 OTHER MICROSCOPIC HEMATURIA: ICD-10-CM

## 2019-02-04 DIAGNOSIS — E03.8 HYPOTHYROIDISM DUE TO HASHIMOTO'S THYROIDITIS: ICD-10-CM

## 2019-02-04 PROBLEM — R05.3 COUGH, PERSISTENT: Status: RESOLVED | Noted: 2018-07-03 | Resolved: 2019-02-04

## 2019-02-04 PROBLEM — Z01.818 PRE-OP EXAM: Status: RESOLVED | Noted: 2018-08-21 | Resolved: 2019-02-04

## 2019-02-04 PROBLEM — R10.819 SUPRAPUBIC TENDERNESS: Status: RESOLVED | Noted: 2018-10-24 | Resolved: 2019-02-04

## 2019-02-04 PROCEDURE — 3078F DIAST BP <80 MM HG: CPT | Performed by: INTERNAL MEDICINE

## 2019-02-04 PROCEDURE — 99214 OFFICE O/P EST MOD 30 MIN: CPT | Performed by: INTERNAL MEDICINE

## 2019-02-04 PROCEDURE — 3074F SYST BP LT 130 MM HG: CPT | Performed by: INTERNAL MEDICINE

## 2019-02-04 PROCEDURE — 3008F BODY MASS INDEX DOCD: CPT | Performed by: INTERNAL MEDICINE

## 2019-02-04 RX ORDER — AMLODIPINE BESYLATE 5 MG/1
5 TABLET ORAL DAILY
Qty: 90 TABLET | Refills: 1 | Status: SHIPPED | OUTPATIENT
Start: 2019-02-04 | End: 2019-08-12 | Stop reason: SDUPTHER

## 2019-02-04 RX ORDER — LEVOTHYROXINE SODIUM 88 UG/1
TABLET ORAL
Qty: 90 TABLET | Refills: 1 | Status: SHIPPED | OUTPATIENT
Start: 2019-02-04 | End: 2019-08-12 | Stop reason: SDUPTHER

## 2019-02-04 RX ORDER — CETIRIZINE HYDROCHLORIDE 10 MG/1
10 TABLET ORAL AS NEEDED
Start: 2019-02-04 | End: 2021-10-28

## 2019-02-04 RX ORDER — LISINOPRIL 40 MG/1
40 TABLET ORAL DAILY
Qty: 90 TABLET | Refills: 1 | Status: SHIPPED | OUTPATIENT
Start: 2019-02-04 | End: 2019-08-12 | Stop reason: SDUPTHER

## 2019-02-04 NOTE — PROGRESS NOTES
Assessment/Plan:    Other microscopic hematuria  Continue to follow up with urology  Currently asymptomatic  Hypothyroidism  Continue with Levothyroxine 88 mcg daily  Recheck TSH in 6 months  Essential hypertension  Well controlled  Continue with current regimen  Thrombocytosis ([address] )  Will recheck CBC  Stable  Diagnoses and all orders for this visit:    Screening for colon cancer  -     Cancel: Ambulatory referral to Gastroenterology; Future    Screening for breast cancer    HTN (hypertension), benign  -     lisinopril (ZESTRIL) 40 mg tablet; Take 1 tablet (40 mg total) by mouth daily    Hypothyroidism due to Hashimoto's thyroiditis  -     levothyroxine 88 mcg tablet; Take 1 tablet in the AM on an empty stomach, 45 minutes prior to taking food or other medications   -     TSH, 3rd generation with Free T4 reflex; Future  -     CBC; Future  -     Comprehensive metabolic panel; Future    Essential hypertension  -     amLODIPine (NORVASC) 5 mg tablet; Take 1 tablet (5 mg total) by mouth daily  -     TSH, 3rd generation with Free T4 reflex; Future  -     CBC; Future  -     Comprehensive metabolic panel; Future  -     Lipid panel; Future  -     UA (URINE) with reflex to Microscopic; Future  -     Microalbumin / creatinine urine ratio; Future    Thrombocytosis (HCC)    Other microscopic hematuria  -     UA (URINE) with reflex to Microscopic; Future  -     Microalbumin / creatinine urine ratio; Future    Environmental allergies  -     cetirizine (ZyrTEC) 10 mg tablet; Take 1 tablet (10 mg total) by mouth as needed for allergies or rhinitis    Encounter for colorectal cancer screening  -     Ambulatory referral to Gastroenterology; Future    Other orders  -     Cancel: Mammo screening bilateral w cad; Future          Subjective:      Patient ID: Maame Grant is a 48 y o  female  48year old female is seen today for follow up of chronic conditions  No labs to review today     She has followed up with Urology, to which urine cytology was negative  She is asymptomatic and denies any urinary symptoms at this time  Hypertension   This is a chronic problem  The current episode started more than 1 year ago  The problem is unchanged  The problem is controlled  Pertinent negatives include no chest pain, headaches, palpitations or shortness of breath  Past treatments include ACE inhibitors and calcium channel blockers  The current treatment provides moderate improvement  There are no compliance problems  Identifiable causes of hypertension include a thyroid problem  Thyroid Problem   Presents for follow-up visit  Patient reports no anxiety, cold intolerance, constipation, depressed mood, diaphoresis, diarrhea, dry skin, fatigue, hair loss, heat intolerance, hoarse voice, leg swelling, menstrual problem, nail problem, palpitations, tremors, visual change, weight gain or weight loss  The symptoms have been stable  The following portions of the patient's history were reviewed and updated as appropriate: allergies, current medications, past family history, past medical history, past social history, past surgical history and problem list     Review of Systems   Constitutional: Negative for activity change, appetite change, chills, diaphoresis, fatigue, fever, weight gain and weight loss  HENT: Negative for congestion, hoarse voice, postnasal drip, rhinorrhea, sinus pain, sinus pressure, sneezing and sore throat  Eyes: Negative for visual disturbance  Respiratory: Negative for apnea, cough, choking, chest tightness, shortness of breath and wheezing  Cardiovascular: Negative for chest pain, palpitations and leg swelling  Gastrointestinal: Negative for abdominal distention, abdominal pain, anal bleeding, blood in stool, constipation, diarrhea, nausea and vomiting  Endocrine: Negative for cold intolerance and heat intolerance     Genitourinary: Negative for difficulty urinating, dysuria, hematuria and menstrual problem  Musculoskeletal: Negative  Skin: Negative  Neurological: Negative for dizziness, tremors, weakness, light-headedness, numbness and headaches  Hematological: Negative for adenopathy  Psychiatric/Behavioral: Negative for agitation, sleep disturbance and suicidal ideas  The patient is not nervous/anxious  All other systems reviewed and are negative  Past Medical History:   Diagnosis Date    Asthma     Disease of thyroid gland     Hypertension     Migraine headache          Current Outpatient Prescriptions:     amLODIPine (NORVASC) 5 mg tablet, Take 1 tablet (5 mg total) by mouth daily, Disp: 90 tablet, Rfl: 1    cetirizine (ZyrTEC) 10 mg tablet, Take 1 tablet (10 mg total) by mouth as needed for allergies or rhinitis, Disp: , Rfl:     clindamycin (CLINDAGEL) 1 % gel, Apply topically as needed, Disp: , Rfl:     fluticasone (FLONASE) 50 mcg/act nasal spray, 2 sprays into each nostril as needed, Disp: , Rfl:     levothyroxine 88 mcg tablet, Take 1 tablet in the AM on an empty stomach, 45 minutes prior to taking food or other medications  , Disp: 90 tablet, Rfl: 1    lisinopril (ZESTRIL) 40 mg tablet, Take 1 tablet (40 mg total) by mouth daily, Disp: 90 tablet, Rfl: 1    tretinoin (RETIN-A) 0 025 % cream, Apply 1 application topically as needed  , Disp: , Rfl:     Allergies   Allergen Reactions    Other      [place]  Dust mites        Social History   Past Surgical History:   Procedure Laterality Date    DILATION AND CURETTAGE OF UTERUS      several    ENDOMETRIAL ABLATION      TUBAL LIGATION  1997    WISDOM TOOTH EXTRACTION  1985     Family History   Problem Relation Age of Onset    Diabetes Mother     Hypertension Mother     Atrial fibrillation Mother     Kidney cancer Mother     Stroke Father     Heart attack Father     Stroke Maternal Grandmother     Diabetes Maternal Grandmother     Stroke Paternal Grandmother     Diabetes Paternal Grandmother Objective:  /78 (BP Location: Right arm, Patient Position: Sitting, Cuff Size: Adult)   Pulse 65   Temp 98 °F (36 7 °C) (Oral)   Ht 5' 3" (1 6 m)   Wt 65 5 kg (144 lb 6 4 oz)   SpO2 98%   BMI 25 58 kg/m²     No results found for this or any previous visit (from the past 1344 hour(s))  Physical Exam   Constitutional: She is oriented to person, place, and time  She appears well-developed and well-nourished  No distress  HENT:   Head: Normocephalic and atraumatic  Eyes: Pupils are equal, round, and reactive to light  Conjunctivae and EOM are normal  Right eye exhibits no discharge  Left eye exhibits no discharge  Neck: Normal range of motion  Neck supple  No JVD present  No thyromegaly present  Cardiovascular: Normal rate, regular rhythm, normal heart sounds and intact distal pulses  Exam reveals no gallop and no friction rub  No murmur heard  Pulmonary/Chest: Effort normal and breath sounds normal  No respiratory distress  She has no wheezes  She has no rales  She exhibits no tenderness  Abdominal: Soft  She exhibits no distension  There is no tenderness  Musculoskeletal: Normal range of motion  She exhibits no edema, tenderness or deformity  Lymphadenopathy:     She has no cervical adenopathy  Neurological: She is alert and oriented to person, place, and time  No cranial nerve deficit  Coordination normal    Skin: Skin is warm and dry  No rash noted  She is not diaphoretic  No erythema  No pallor  Psychiatric: She has a normal mood and affect  Her behavior is normal  Judgment and thought content normal    Nursing note and vitals reviewed

## 2019-03-26 ENCOUNTER — TELEPHONE (OUTPATIENT)
Dept: UROLOGY | Facility: AMBULATORY SURGERY CENTER | Age: 54
End: 2019-03-26

## 2019-03-26 NOTE — TELEPHONE ENCOUNTER
I called and s/w patient to see if she would like to r/s her appt with Dr Caren Davis from Feb  Patient said at this time she is not having any problems and declined to r/s at this time  Patient said she will give us a call if a problem occurs

## 2019-08-10 ENCOUNTER — APPOINTMENT (OUTPATIENT)
Dept: LAB | Facility: IMAGING CENTER | Age: 54
End: 2019-08-10
Payer: COMMERCIAL

## 2019-08-10 ENCOUNTER — TRANSCRIBE ORDERS (OUTPATIENT)
Dept: ADMINISTRATIVE | Facility: HOSPITAL | Age: 54
End: 2019-08-10

## 2019-08-10 DIAGNOSIS — E06.3 HYPOTHYROIDISM DUE TO HASHIMOTO'S THYROIDITIS: ICD-10-CM

## 2019-08-10 DIAGNOSIS — I10 ESSENTIAL HYPERTENSION: ICD-10-CM

## 2019-08-10 DIAGNOSIS — E03.8 HYPOTHYROIDISM DUE TO HASHIMOTO'S THYROIDITIS: ICD-10-CM

## 2019-08-10 DIAGNOSIS — R31.29 OTHER MICROSCOPIC HEMATURIA: ICD-10-CM

## 2019-08-10 LAB
ALBUMIN SERPL BCP-MCNC: 4.2 G/DL (ref 3.5–5)
ALP SERPL-CCNC: 116 U/L (ref 46–116)
ALT SERPL W P-5'-P-CCNC: 23 U/L (ref 12–78)
ANION GAP SERPL CALCULATED.3IONS-SCNC: 9 MMOL/L (ref 4–13)
AST SERPL W P-5'-P-CCNC: 17 U/L (ref 5–45)
BILIRUB SERPL-MCNC: 0.42 MG/DL (ref 0.2–1)
BILIRUB UR QL STRIP: NEGATIVE
BUN SERPL-MCNC: 15 MG/DL (ref 5–25)
CALCIUM SERPL-MCNC: 9.2 MG/DL (ref 8.3–10.1)
CHLORIDE SERPL-SCNC: 103 MMOL/L (ref 100–108)
CHOLEST SERPL-MCNC: 208 MG/DL (ref 50–200)
CLARITY UR: CLEAR
CO2 SERPL-SCNC: 26 MMOL/L (ref 21–32)
COLOR UR: YELLOW
CREAT SERPL-MCNC: 1 MG/DL (ref 0.6–1.3)
CREAT UR-MCNC: 31.5 MG/DL
ERYTHROCYTE [DISTWIDTH] IN BLOOD BY AUTOMATED COUNT: 12.4 % (ref 11.6–15.1)
GFR SERPL CREATININE-BSD FRML MDRD: 65 ML/MIN/1.73SQ M
GLUCOSE P FAST SERPL-MCNC: 89 MG/DL (ref 65–99)
GLUCOSE UR STRIP-MCNC: NEGATIVE MG/DL
HCT VFR BLD AUTO: 45.7 % (ref 34.8–46.1)
HDLC SERPL-MCNC: 65 MG/DL (ref 40–60)
HGB BLD-MCNC: 15.3 G/DL (ref 11.5–15.4)
HGB UR QL STRIP.AUTO: NEGATIVE
KETONES UR STRIP-MCNC: NEGATIVE MG/DL
LDLC SERPL CALC-MCNC: 126 MG/DL (ref 0–100)
LEUKOCYTE ESTERASE UR QL STRIP: NEGATIVE
MCH RBC QN AUTO: 31.9 PG (ref 26.8–34.3)
MCHC RBC AUTO-ENTMCNC: 33.5 G/DL (ref 31.4–37.4)
MCV RBC AUTO: 95 FL (ref 82–98)
MICROALBUMIN UR-MCNC: <5 MG/L (ref 0–20)
MICROALBUMIN/CREAT 24H UR: <16 MG/G CREATININE (ref 0–30)
NITRITE UR QL STRIP: NEGATIVE
NONHDLC SERPL-MCNC: 143 MG/DL
PH UR STRIP.AUTO: 7 [PH]
PLATELET # BLD AUTO: 427 THOUSANDS/UL (ref 149–390)
PMV BLD AUTO: 9.8 FL (ref 8.9–12.7)
POTASSIUM SERPL-SCNC: 4.8 MMOL/L (ref 3.5–5.3)
PROT SERPL-MCNC: 7.4 G/DL (ref 6.4–8.2)
PROT UR STRIP-MCNC: NEGATIVE MG/DL
RBC # BLD AUTO: 4.8 MILLION/UL (ref 3.81–5.12)
SODIUM SERPL-SCNC: 138 MMOL/L (ref 136–145)
SP GR UR STRIP.AUTO: 1.01 (ref 1–1.03)
TRIGL SERPL-MCNC: 85 MG/DL
TSH SERPL DL<=0.05 MIU/L-ACNC: 3.48 UIU/ML (ref 0.36–3.74)
UROBILINOGEN UR QL STRIP.AUTO: 0.2 E.U./DL
WBC # BLD AUTO: 8.19 THOUSAND/UL (ref 4.31–10.16)

## 2019-08-10 PROCEDURE — 82043 UR ALBUMIN QUANTITATIVE: CPT

## 2019-08-10 PROCEDURE — 85027 COMPLETE CBC AUTOMATED: CPT

## 2019-08-10 PROCEDURE — 84443 ASSAY THYROID STIM HORMONE: CPT

## 2019-08-10 PROCEDURE — 81003 URINALYSIS AUTO W/O SCOPE: CPT

## 2019-08-10 PROCEDURE — 80061 LIPID PANEL: CPT

## 2019-08-10 PROCEDURE — 80053 COMPREHEN METABOLIC PANEL: CPT

## 2019-08-10 PROCEDURE — 36415 COLL VENOUS BLD VENIPUNCTURE: CPT

## 2019-08-10 PROCEDURE — 82570 ASSAY OF URINE CREATININE: CPT

## 2019-08-12 ENCOUNTER — OFFICE VISIT (OUTPATIENT)
Dept: INTERNAL MEDICINE CLINIC | Facility: CLINIC | Age: 54
End: 2019-08-12
Payer: COMMERCIAL

## 2019-08-12 VITALS
BODY MASS INDEX: 25.41 KG/M2 | DIASTOLIC BLOOD PRESSURE: 84 MMHG | WEIGHT: 143.4 LBS | HEIGHT: 63 IN | OXYGEN SATURATION: 98 % | HEART RATE: 84 BPM | SYSTOLIC BLOOD PRESSURE: 124 MMHG | TEMPERATURE: 98.2 F

## 2019-08-12 DIAGNOSIS — I10 ESSENTIAL HYPERTENSION: ICD-10-CM

## 2019-08-12 DIAGNOSIS — E03.8 HYPOTHYROIDISM DUE TO HASHIMOTO'S THYROIDITIS: ICD-10-CM

## 2019-08-12 DIAGNOSIS — D75.839 THROMBOCYTOSIS: ICD-10-CM

## 2019-08-12 DIAGNOSIS — Z12.11 SCREENING FOR COLON CANCER: Primary | ICD-10-CM

## 2019-08-12 DIAGNOSIS — J30.2 SEASONAL ALLERGIES: ICD-10-CM

## 2019-08-12 DIAGNOSIS — E06.3 HYPOTHYROIDISM DUE TO HASHIMOTO'S THYROIDITIS: ICD-10-CM

## 2019-08-12 DIAGNOSIS — I10 HTN (HYPERTENSION), BENIGN: ICD-10-CM

## 2019-08-12 PROBLEM — R00.2 PALPITATION: Status: RESOLVED | Noted: 2018-05-22 | Resolved: 2019-08-12

## 2019-08-12 PROBLEM — R31.29 OTHER MICROSCOPIC HEMATURIA: Status: RESOLVED | Noted: 2018-10-24 | Resolved: 2019-08-12

## 2019-08-12 PROBLEM — M79.672 PAIN IN LEFT FOOT: Status: RESOLVED | Noted: 2018-08-21 | Resolved: 2019-08-12

## 2019-08-12 PROCEDURE — 3079F DIAST BP 80-89 MM HG: CPT | Performed by: INTERNAL MEDICINE

## 2019-08-12 PROCEDURE — 99215 OFFICE O/P EST HI 40 MIN: CPT | Performed by: INTERNAL MEDICINE

## 2019-08-12 PROCEDURE — 3074F SYST BP LT 130 MM HG: CPT | Performed by: INTERNAL MEDICINE

## 2019-08-12 PROCEDURE — 3008F BODY MASS INDEX DOCD: CPT | Performed by: INTERNAL MEDICINE

## 2019-08-12 RX ORDER — LEVOTHYROXINE SODIUM 88 UG/1
TABLET ORAL
Qty: 90 TABLET | Refills: 1 | Status: SHIPPED | OUTPATIENT
Start: 2019-08-12 | End: 2019-11-19 | Stop reason: SDUPTHER

## 2019-08-12 RX ORDER — LISINOPRIL 40 MG/1
40 TABLET ORAL DAILY
Qty: 90 TABLET | Refills: 1 | Status: SHIPPED | OUTPATIENT
Start: 2019-08-12 | End: 2019-11-19 | Stop reason: SDUPTHER

## 2019-08-12 RX ORDER — AMLODIPINE BESYLATE 5 MG/1
5 TABLET ORAL DAILY
Qty: 90 TABLET | Refills: 1 | Status: SHIPPED | OUTPATIENT
Start: 2019-08-12 | End: 2019-11-19 | Stop reason: SDUPTHER

## 2019-08-12 NOTE — PROGRESS NOTES
Assessment/Plan:    Hypothyroidism  Continue levothyroxine 88 mcg daily  Essential hypertension  Well controlled, continue with amlodipine 5 mg daily and lisinopril 40 mg daily  Thrombocytosis (HCC)  Mild, continue to monitor  Asymptomatic  Seasonal allergies  Continue zyrtec and flonase  Diagnoses and all orders for this visit:    Screening for colon cancer  -     Ambulatory referral to Colorectal Surgery; Future    BMI 25 0-25 9,adult    Essential hypertension  -     amLODIPine (NORVASC) 5 mg tablet; Take 1 tablet (5 mg total) by mouth daily    Hypothyroidism due to Hashimoto's thyroiditis  -     levothyroxine 88 mcg tablet; Take 1 tablet in the AM on an empty stomach, 45 minutes prior to taking food or other medications  HTN (hypertension), benign  -     lisinopril (ZESTRIL) 40 mg tablet; Take 1 tablet (40 mg total) by mouth daily    Thrombocytosis (HCC)    Seasonal allergies    Other orders  -     Cancel: Ambulatory referral to Gastroenterology; Future        BMI Counseling: Body mass index is 25 4 kg/m²  Discussed the patient's BMI with her  The BMI is above average  BMI counseling and education was provided to the patient  Nutrition recommendations include reducing portion sizes, decreasing overall calorie intake, 3-5 servings of fruits/vegetables daily, reducing fast food intake, consuming healthier snacks, decreasing soda and/or juice intake, moderation in carbohydrate intake, increasing intake of lean protein, reducing intake of saturated fat and trans fat and reducing intake of cholesterol  Exercise recommendations include moderate aerobic physical activity for 150 minutes/week and exercising 3-5 times per week  Subjective:      Patient ID: Gonsalo Ochoa is a 48 y o  female  48year old female is seen today for follow up of chronic conditions  Lab studies reviewed today, CBC, CMP, TSH, lipid panel and UA/microalb:cr all within acceptable range     No active complaints at this time  Hypertension   This is a chronic problem  The current episode started more than 1 year ago  The problem is unchanged  The problem is controlled  Pertinent negatives include no chest pain, headaches, palpitations or shortness of breath  Past treatments include ACE inhibitors and calcium channel blockers  The current treatment provides moderate improvement  There are no compliance problems  Identifiable causes of hypertension include a thyroid problem  Thyroid Problem   Presents for follow-up visit  Patient reports no anxiety, cold intolerance, constipation, depressed mood, diaphoresis, diarrhea, fatigue, hair loss, heat intolerance, hoarse voice, leg swelling, nail problem, palpitations, tremors, visual change or weight gain  The symptoms have been stable  The 10-year ASCVD risk score (Yessenia Munguia et al , 2013) is: 4 4%    Values used to calculate the score:      Age: 48 years      Sex: Female      Is Non- : No      Diabetic: No      Tobacco smoker: Yes      Systolic Blood Pressure: 069 mmHg      Is BP treated: Yes      HDL Cholesterol: 65 mg/dL      Total Cholesterol: 208 mg/dL    The following portions of the patient's history were reviewed and updated as appropriate: allergies, current medications, past family history, past medical history, past social history, past surgical history and problem list     Review of Systems   Constitutional: Negative for activity change, appetite change, chills, diaphoresis, fatigue, fever and weight gain  HENT: Negative for congestion, hoarse voice, postnasal drip, rhinorrhea, sinus pressure, sinus pain, sneezing and sore throat  Eyes: Negative for visual disturbance  Respiratory: Negative for apnea, cough, choking, chest tightness, shortness of breath and wheezing  Cardiovascular: Negative for chest pain, palpitations and leg swelling     Gastrointestinal: Negative for abdominal distention, abdominal pain, anal bleeding, blood in stool, constipation, diarrhea, nausea and vomiting  Endocrine: Negative for cold intolerance and heat intolerance  Genitourinary: Negative for difficulty urinating, dysuria and hematuria  Musculoskeletal: Negative  Skin: Negative  Neurological: Negative for dizziness, tremors, weakness, light-headedness, numbness and headaches  Hematological: Negative for adenopathy  Psychiatric/Behavioral: Negative for agitation, sleep disturbance and suicidal ideas  The patient is not nervous/anxious  All other systems reviewed and are negative  Past Medical History:   Diagnosis Date    Asthma     Disease of thyroid gland     Hypertension     Migraine headache          Current Outpatient Medications:     amLODIPine (NORVASC) 5 mg tablet, Take 1 tablet (5 mg total) by mouth daily, Disp: 90 tablet, Rfl: 1    cetirizine (ZyrTEC) 10 mg tablet, Take 1 tablet (10 mg total) by mouth as needed for allergies or rhinitis, Disp: , Rfl:     clindamycin (CLINDAGEL) 1 % gel, Apply topically as needed, Disp: , Rfl:     fluticasone (FLONASE) 50 mcg/act nasal spray, 2 sprays into each nostril as needed, Disp: , Rfl:     levothyroxine 88 mcg tablet, Take 1 tablet in the AM on an empty stomach, 45 minutes prior to taking food or other medications  , Disp: 90 tablet, Rfl: 1    lisinopril (ZESTRIL) 40 mg tablet, Take 1 tablet (40 mg total) by mouth daily, Disp: 90 tablet, Rfl: 1    tretinoin (RETIN-A) 0 025 % cream, Apply 1 application topically as needed  , Disp: , Rfl:     Allergies   Allergen Reactions    Other      [place]  Dust mites        Social History   Past Surgical History:   Procedure Laterality Date    DILATION AND CURETTAGE OF UTERUS      several    ENDOMETRIAL ABLATION      TUBAL LIGATION  1997    WISDOM TOOTH EXTRACTION  1985     Family History   Problem Relation Age of Onset    Diabetes Mother     Hypertension Mother     Atrial fibrillation Mother     Kidney cancer Mother    [employer] Stroke Father     Heart attack Father     Stroke Maternal Grandmother     Diabetes Maternal Grandmother     Stroke Paternal Grandmother     Diabetes Paternal Grandmother        Objective:  /84 (BP Location: Left arm, Patient Position: Sitting, Cuff Size: Adult)   Pulse 84   Temp 98 2 °F (36 8 °C) (Oral)   Ht 5' 3" (1 6 m)   Wt 65 kg (143 lb 6 4 oz)   SpO2 98%   BMI 25 40 kg/m²     Recent Results (from the past 1344 hour(s))   TSH, 3rd generation with Free T4 reflex    Collection Time: 08/10/19  7:36 AM   Result Value Ref Range    TSH 3RD GENERATON 3 480 0 358 - 3 740 uIU/mL   CBC    Collection Time: 08/10/19  7:36 AM   Result Value Ref Range    WBC 8 19 4 31 - 10 16 Thousand/uL    RBC 4 80 3 81 - 5 12 Million/uL    Hemoglobin 15 3 11 5 - 15 4 g/dL    Hematocrit 45 7 34 8 - 46 1 %    MCV 95 82 - 98 fL    MCH 31 9 26 8 - 34 3 pg    MCHC 33 5 31 4 - 37 4 g/dL    RDW 12 4 11 6 - 15 1 %    Platelets 405 (H) 813 - 390 Thousands/uL    MPV 9 8 8 9 - 12 7 fL   Comprehensive metabolic panel    Collection Time: 08/10/19  7:36 AM   Result Value Ref Range    Sodium 138 136 - 145 mmol/L    Potassium 4 8 3 5 - 5 3 mmol/L    Chloride 103 100 - 108 mmol/L    CO2 26 21 - 32 mmol/L    ANION GAP 9 4 - 13 mmol/L    BUN 15 5 - 25 mg/dL    Creatinine 1 00 0 60 - 1 30 mg/dL    Glucose, Fasting 89 65 - 99 mg/dL    Calcium 9 2 8 3 - 10 1 mg/dL    AST 17 5 - 45 U/L    ALT 23 12 - 78 U/L    Alkaline Phosphatase 116 46 - 116 U/L    Total Protein 7 4 6 4 - 8 2 g/dL    Albumin 4 2 3 5 - 5 0 g/dL    Total Bilirubin 0 42 0 20 - 1 00 mg/dL    eGFR 65 ml/min/1 73sq m   Lipid panel    Collection Time: 08/10/19  7:36 AM   Result Value Ref Range    Cholesterol 208 (H) 50 - 200 mg/dL    Triglycerides 85 <=150 mg/dL    HDL, Direct 65 (H) 40 - 60 mg/dL    LDL Calculated 126 (H) 0 - 100 mg/dL    Non-HDL-Chol (CHOL-HDL) 143 mg/dl   UA (URINE) with reflex to Microscopic    Collection Time: 08/10/19  7:36 AM   Result Value Ref Range    Color, UA Yellow     Clarity, UA Clear     Specific Gravity, UA 1 009 1 003 - 1 030    pH, UA 7 0 4 5, 5 0, 5 5, 6 0, 6 5, 7 0, 7 5, 8 0    Leukocytes, UA Negative Negative    Nitrite, UA Negative Negative    Protein, UA Negative Negative mg/dl    Glucose, UA Negative Negative mg/dl    Ketones, UA Negative Negative mg/dl    Urobilinogen, UA 0 2 0 2, 1 0 E U /dl E U /dl    Bilirubin, UA Negative Negative    Blood, UA Negative Negative   Microalbumin / creatinine urine ratio    Collection Time: 08/10/19  7:36 AM   Result Value Ref Range    Creatinine, Ur 31 5 mg/dL    Microalbum  ,U,Random <5 0 0 0 - 20 0 mg/L    Microalb Creat Ratio <16 0 - 30 mg/g creatinine            Physical Exam   Constitutional: She is oriented to person, place, and time  She appears well-developed and well-nourished  No distress  HENT:   Head: Normocephalic and atraumatic  Eyes: Pupils are equal, round, and reactive to light  Conjunctivae and EOM are normal  Right eye exhibits no discharge  Left eye exhibits no discharge  Neck: Normal range of motion  Neck supple  No JVD present  No thyromegaly present  Cardiovascular: Normal rate, regular rhythm, normal heart sounds and intact distal pulses  Exam reveals no gallop and no friction rub  No murmur heard  Pulmonary/Chest: Effort normal and breath sounds normal  No respiratory distress  She has no wheezes  She has no rales  She exhibits no tenderness  Abdominal: Soft  She exhibits no distension  There is no tenderness  Musculoskeletal: Normal range of motion  She exhibits no edema, tenderness or deformity  Lymphadenopathy:     She has no cervical adenopathy  Neurological: She is alert and oriented to person, place, and time  No cranial nerve deficit  Coordination normal    Skin: Skin is warm and dry  No rash noted  She is not diaphoretic  No erythema  No pallor  Psychiatric: She has a normal mood and affect   Her behavior is normal  Judgment and thought content normal    Nursing note and vitals reviewed

## 2019-11-19 DIAGNOSIS — I10 ESSENTIAL HYPERTENSION: ICD-10-CM

## 2019-11-19 DIAGNOSIS — I10 HTN (HYPERTENSION), BENIGN: ICD-10-CM

## 2019-11-19 DIAGNOSIS — E03.8 HYPOTHYROIDISM DUE TO HASHIMOTO'S THYROIDITIS: ICD-10-CM

## 2019-11-19 DIAGNOSIS — E06.3 HYPOTHYROIDISM DUE TO HASHIMOTO'S THYROIDITIS: ICD-10-CM

## 2019-11-19 RX ORDER — LEVOTHYROXINE SODIUM 88 UG/1
TABLET ORAL
Qty: 90 TABLET | Refills: 0 | Status: SHIPPED | OUTPATIENT
Start: 2019-11-19 | End: 2020-02-05 | Stop reason: SDUPTHER

## 2019-11-19 RX ORDER — AMLODIPINE BESYLATE 5 MG/1
5 TABLET ORAL DAILY
Qty: 90 TABLET | Refills: 0 | Status: SHIPPED | OUTPATIENT
Start: 2019-11-19 | End: 2020-02-05 | Stop reason: SDUPTHER

## 2019-11-19 RX ORDER — LISINOPRIL 40 MG/1
40 TABLET ORAL DAILY
Qty: 90 TABLET | Refills: 0 | Status: SHIPPED | OUTPATIENT
Start: 2019-11-19 | End: 2020-02-05 | Stop reason: SDUPTHER

## 2019-11-19 NOTE — TELEPHONE ENCOUNTER
Pt has refills but has new mail order service  Pt will call back to confirm the mail order since there are a few listed in different locations, none of which have the phone number the pt left on the refill line

## 2020-02-05 ENCOUNTER — OFFICE VISIT (OUTPATIENT)
Dept: INTERNAL MEDICINE CLINIC | Facility: CLINIC | Age: 55
End: 2020-02-05
Payer: COMMERCIAL

## 2020-02-05 VITALS
HEART RATE: 74 BPM | OXYGEN SATURATION: 99 % | TEMPERATURE: 98 F | DIASTOLIC BLOOD PRESSURE: 66 MMHG | HEIGHT: 63 IN | WEIGHT: 147 LBS | BODY MASS INDEX: 26.05 KG/M2 | SYSTOLIC BLOOD PRESSURE: 106 MMHG

## 2020-02-05 DIAGNOSIS — F17.200 TOBACCO USE DISORDER: ICD-10-CM

## 2020-02-05 DIAGNOSIS — Z13.220 LIPID SCREENING: ICD-10-CM

## 2020-02-05 DIAGNOSIS — I10 ESSENTIAL HYPERTENSION: ICD-10-CM

## 2020-02-05 DIAGNOSIS — E06.3 HYPOTHYROIDISM DUE TO HASHIMOTO'S THYROIDITIS: ICD-10-CM

## 2020-02-05 DIAGNOSIS — J30.1 NON-SEASONAL ALLERGIC RHINITIS DUE TO POLLEN: Primary | ICD-10-CM

## 2020-02-05 DIAGNOSIS — I10 HTN (HYPERTENSION), BENIGN: ICD-10-CM

## 2020-02-05 DIAGNOSIS — E03.8 HYPOTHYROIDISM DUE TO HASHIMOTO'S THYROIDITIS: ICD-10-CM

## 2020-02-05 DIAGNOSIS — D75.839 THROMBOCYTOSIS: ICD-10-CM

## 2020-02-05 PROCEDURE — 99214 OFFICE O/P EST MOD 30 MIN: CPT | Performed by: INTERNAL MEDICINE

## 2020-02-05 PROCEDURE — 3008F BODY MASS INDEX DOCD: CPT | Performed by: INTERNAL MEDICINE

## 2020-02-05 PROCEDURE — 3074F SYST BP LT 130 MM HG: CPT | Performed by: INTERNAL MEDICINE

## 2020-02-05 PROCEDURE — 3078F DIAST BP <80 MM HG: CPT | Performed by: INTERNAL MEDICINE

## 2020-02-05 PROCEDURE — 4004F PT TOBACCO SCREEN RCVD TLK: CPT | Performed by: INTERNAL MEDICINE

## 2020-02-05 RX ORDER — MONTELUKAST SODIUM 10 MG/1
10 TABLET ORAL
Qty: 30 TABLET | Refills: 0 | Status: SHIPPED | OUTPATIENT
Start: 2020-02-05 | End: 2020-09-01

## 2020-02-05 RX ORDER — LEVOTHYROXINE SODIUM 88 UG/1
TABLET ORAL
Qty: 90 TABLET | Refills: 0 | Status: SHIPPED | OUTPATIENT
Start: 2020-02-05 | End: 2020-05-13 | Stop reason: SDUPTHER

## 2020-02-05 RX ORDER — LISINOPRIL 40 MG/1
40 TABLET ORAL DAILY
Qty: 90 TABLET | Refills: 0 | Status: SHIPPED | OUTPATIENT
Start: 2020-02-05 | End: 2020-05-13 | Stop reason: SDUPTHER

## 2020-02-05 RX ORDER — AMLODIPINE BESYLATE 5 MG/1
5 TABLET ORAL DAILY
Qty: 90 TABLET | Refills: 0 | Status: SHIPPED | OUTPATIENT
Start: 2020-02-05 | End: 2020-05-13 | Stop reason: SDUPTHER

## 2020-02-05 NOTE — ASSESSMENT & PLAN NOTE
Continue Flonase and Zyrtec  Will trial Singulair 10 mg at bedtime  She is to contact office for worsening symptoms

## 2020-02-05 NOTE — PROGRESS NOTES
Assessment/Plan:    Hypothyroidism  Continue levothyroxine 88 mcg daily  Essential hypertension  Her well controlled  Continue amlodipine 5 mg daily and lisinopril 40 mg daily  Thrombocytosis ([address] )  Will check a CBC  Non-seasonal allergic rhinitis due to pollen  Continue Flonase and Zyrtec  Will trial Singulair 10 mg at bedtime  She is to contact office for worsening symptoms  Diagnoses and all orders for this visit:    Non-seasonal allergic rhinitis due to pollen  -     CBC; Future  -     Comprehensive metabolic panel; Future  -     montelukast (SINGULAIR) 10 mg tablet; Take 1 tablet (10 mg total) by mouth daily at bedtime    Essential hypertension  -     amLODIPine (NORVASC) 5 mg tablet; Take 1 tablet (5 mg total) by mouth daily  -     CBC; Future  -     Comprehensive metabolic panel; Future  -     Lipid panel; Future  -     TSH, 3rd generation with Free T4 reflex; Future    Hypothyroidism due to Hashimoto's thyroiditis  -     levothyroxine 88 mcg tablet; Take 1 tablet in the AM on an empty stomach, 45 minutes prior to taking food or other medications  -     CBC; Future  -     Comprehensive metabolic panel; Future  -     TSH, 3rd generation with Free T4 reflex; Future    HTN (hypertension), benign  -     lisinopril (ZESTRIL) 40 mg tablet; Take 1 tablet (40 mg total) by mouth daily    Thrombocytosis (HCC)  -     CBC; Future    Tobacco use disorder    Lipid screening  -     Lipid panel; Future          BMI Counseling: Body mass index is 26 04 kg/m²  The BMI is above normal  Nutrition recommendations include decreasing portion sizes, encouraging healthy choices of fruits and vegetables, decreasing fast food intake, consuming healthier snacks, limiting drinks that contain sugar, moderation in carbohydrate intake, increasing intake of lean protein, reducing intake of saturated and trans fat and reducing intake of cholesterol   Exercise recommendations include moderate physical activity 150 minutes/week and exercising 3-5 times per week  No pharmacotherapy was ordered  Patient referred to PCP due to patient being overweight  Depression Screening and Follow-up Plan: Patient's depression screening was positive with a PHQ-2 score of 0  Clincally patient does not have depression  No treatment is required  Tobacco Cessation Counseling: Tobacco cessation counseling was provided  The patient is sincerely urged to quit consumption of tobacco  She is not ready to quit tobacco  Medication options and side effects of medication discussed  Patient refused medication  Time spent during encounter: 25 minutes (counseling, reviewing medications, and discussing treatment and plan)    Subjective:      Patient ID: Rashel Paiz is a 50 y o  female  Chief Complaint   Patient presents with    Follow-up     6 month follow up   Sinus Problem       50year old female is seen today for follow up of chronic conditions  No labs to review today  She is up to date on metabolic screening  She has been compliant with current medication regimen  She has been experiencing allergic rhinitis and congestion that is worse at night  She takes zyrtec daily and Flonase as needed  Hypertension   This is a chronic problem  The current episode started more than 1 year ago  The problem is unchanged  The problem is controlled  Pertinent negatives include no anxiety, blurred vision, chest pain, headaches, malaise/fatigue, neck pain, orthopnea, palpitations, peripheral edema, PND, shortness of breath or sweats  Risk factors for coronary artery disease include post-menopausal state and smoking/tobacco exposure  Past treatments include calcium channel blockers and ACE inhibitors  The current treatment provides moderate improvement  There are no compliance problems  Identifiable causes of hypertension include a thyroid problem  Thyroid Problem   Presents for follow-up visit   Patient reports no anxiety, cold intolerance, constipation, depressed mood, diaphoresis, diarrhea, dry skin, fatigue, hair loss, heat intolerance, hoarse voice, leg swelling, menstrual problem, nail problem, palpitations, tremors, visual change, weight gain or weight loss  The symptoms have been stable  The following portions of the patient's history were reviewed and updated as appropriate: allergies, current medications, past family history, past medical history, past social history, past surgical history and problem list     Review of Systems   Constitutional: Negative for activity change, appetite change, chills, diaphoresis, fatigue, fever, malaise/fatigue, weight gain and weight loss  HENT: Negative for congestion, hoarse voice, postnasal drip, rhinorrhea, sinus pressure, sinus pain, sneezing and sore throat  Eyes: Negative for blurred vision and visual disturbance  Respiratory: Negative for apnea, cough, choking, chest tightness, shortness of breath and wheezing  Cardiovascular: Negative for chest pain, palpitations, orthopnea, leg swelling and PND  Gastrointestinal: Negative for abdominal distention, abdominal pain, anal bleeding, blood in stool, constipation, diarrhea, nausea and vomiting  Endocrine: Negative for cold intolerance and heat intolerance  Genitourinary: Negative for difficulty urinating, dysuria, hematuria and menstrual problem  Musculoskeletal: Negative  Negative for neck pain  Skin: Negative  Neurological: Negative for dizziness, tremors, weakness, light-headedness, numbness and headaches  Hematological: Negative for adenopathy  Psychiatric/Behavioral: Negative for agitation, sleep disturbance and suicidal ideas  The patient is not nervous/anxious  All other systems reviewed and are negative          Past Medical History:   Diagnosis Date    Asthma     Disease of thyroid gland     Hypertension     Migraine headache          Current Outpatient Medications:     amLODIPine (NORVASC) 5 mg tablet, Take 1 tablet (5 mg total) by mouth daily, Disp: 90 tablet, Rfl: 0    cetirizine (ZyrTEC) 10 mg tablet, Take 1 tablet (10 mg total) by mouth as needed for allergies or rhinitis, Disp: , Rfl:     clindamycin (CLINDAGEL) 1 % gel, Apply topically as needed, Disp: , Rfl:     levothyroxine 88 mcg tablet, Take 1 tablet in the AM on an empty stomach, 45 minutes prior to taking food or other medications  , Disp: 90 tablet, Rfl: 0    lisinopril (ZESTRIL) 40 mg tablet, Take 1 tablet (40 mg total) by mouth daily, Disp: 90 tablet, Rfl: 0    tretinoin (RETIN-A) 0 025 % cream, Apply 1 application topically as needed  , Disp: , Rfl:     fluticasone (FLONASE) 50 mcg/act nasal spray, 2 sprays into each nostril as needed, Disp: , Rfl:     montelukast (SINGULAIR) 10 mg tablet, Take 1 tablet (10 mg total) by mouth daily at bedtime, Disp: 30 tablet, Rfl: 0    Allergies   Allergen Reactions    Other      [place]  Dust mites        Social History   Past Surgical History:   Procedure Laterality Date    DILATION AND CURETTAGE OF UTERUS      several    ENDOMETRIAL ABLATION      TUBAL LIGATION  1997    WISDOM TOOTH EXTRACTION  1985     Family History   Problem Relation Age of Onset    Diabetes Mother     Hypertension Mother     Atrial fibrillation Mother     Kidney cancer Mother     Stroke Father     Heart attack Father     Stroke Maternal Grandmother     Diabetes Maternal Grandmother     Stroke Paternal Grandmother     Diabetes Paternal Grandmother        Objective:  /66 (BP Location: Left arm, Patient Position: Sitting, Cuff Size: Adult)   Pulse 74   Temp 98 °F (36 7 °C) (Oral)   Ht 5' 3" (1 6 m)   Wt 66 7 kg (147 lb)   SpO2 99%   BMI 26 04 kg/m²     No results found for this or any previous visit (from the past 1344 hour(s))  Physical Exam   Constitutional: She is oriented to person, place, and time  She appears well-developed and well-nourished  No distress     HENT:   Head: Normocephalic and atraumatic  Eyes: Pupils are equal, round, and reactive to light  Conjunctivae and EOM are normal  Right eye exhibits no discharge  Left eye exhibits no discharge  Neck: Normal range of motion  Neck supple  No JVD present  No thyromegaly present  Cardiovascular: Normal rate, regular rhythm, normal heart sounds and intact distal pulses  Exam reveals no gallop and no friction rub  No murmur heard  Pulmonary/Chest: Effort normal and breath sounds normal  No respiratory distress  She has no wheezes  She has no rales  She exhibits no tenderness  Abdominal: Soft  She exhibits no distension  There is no tenderness  Musculoskeletal: Normal range of motion  She exhibits no edema, tenderness or deformity  Lymphadenopathy:     She has no cervical adenopathy  Neurological: She is alert and oriented to person, place, and time  No cranial nerve deficit  Coordination normal    Skin: Skin is warm and dry  No rash noted  She is not diaphoretic  No erythema  No pallor  Psychiatric: She has a normal mood and affect  Her behavior is normal  Judgment and thought content normal    Nursing note and vitals reviewed

## 2020-02-10 DIAGNOSIS — Z12.11 SCREENING FOR MALIGNANT NEOPLASM OF COLON: Primary | ICD-10-CM

## 2020-05-13 DIAGNOSIS — I10 HTN (HYPERTENSION), BENIGN: ICD-10-CM

## 2020-05-13 DIAGNOSIS — E03.8 HYPOTHYROIDISM DUE TO HASHIMOTO'S THYROIDITIS: ICD-10-CM

## 2020-05-13 DIAGNOSIS — I10 ESSENTIAL HYPERTENSION: ICD-10-CM

## 2020-05-13 DIAGNOSIS — E06.3 HYPOTHYROIDISM DUE TO HASHIMOTO'S THYROIDITIS: ICD-10-CM

## 2020-05-13 RX ORDER — AMLODIPINE BESYLATE 5 MG/1
5 TABLET ORAL DAILY
Qty: 90 TABLET | Refills: 1 | Status: SHIPPED | OUTPATIENT
Start: 2020-05-13 | End: 2020-05-13 | Stop reason: SDUPTHER

## 2020-05-13 RX ORDER — LISINOPRIL 40 MG/1
40 TABLET ORAL DAILY
Qty: 90 TABLET | Refills: 1 | Status: SHIPPED | OUTPATIENT
Start: 2020-05-13 | End: 2020-05-13 | Stop reason: SDUPTHER

## 2020-05-13 RX ORDER — LEVOTHYROXINE SODIUM 88 UG/1
TABLET ORAL
Qty: 90 TABLET | Refills: 1 | Status: SHIPPED | OUTPATIENT
Start: 2020-05-13 | End: 2020-05-13 | Stop reason: SDUPTHER

## 2020-05-13 RX ORDER — LEVOTHYROXINE SODIUM 88 UG/1
TABLET ORAL
Qty: 90 TABLET | Refills: 1 | Status: SHIPPED | OUTPATIENT
Start: 2020-05-13 | End: 2020-08-10 | Stop reason: SDUPTHER

## 2020-05-13 RX ORDER — LISINOPRIL 40 MG/1
40 TABLET ORAL DAILY
Qty: 90 TABLET | Refills: 1 | Status: SHIPPED | OUTPATIENT
Start: 2020-05-13 | End: 2020-11-20 | Stop reason: SDUPTHER

## 2020-05-13 RX ORDER — AMLODIPINE BESYLATE 5 MG/1
5 TABLET ORAL DAILY
Qty: 90 TABLET | Refills: 1 | Status: SHIPPED | OUTPATIENT
Start: 2020-05-13 | End: 2020-09-01

## 2020-07-28 ENCOUNTER — TELEPHONE (OUTPATIENT)
Dept: INTERNAL MEDICINE CLINIC | Facility: CLINIC | Age: 55
End: 2020-07-28

## 2020-07-28 NOTE — TELEPHONE ENCOUNTER
I called [facility] does not have lab results from Feb 2020  The last entry for this patient was 7/23/2020 - drug screen and prior to this 2018 0 Vaginitis testing and 2016 HPV  No blood work done at [facility]  She has open orders in her chart from Feb 2020 that are not resulted        Message left for this patient to call back

## 2020-07-28 NOTE — TELEPHONE ENCOUNTER
I have never seen this patient although I am listed as PCP  I will forward to the last provider she saw    Thank you

## 2020-07-28 NOTE — TELEPHONE ENCOUNTER
Patient called back and continues to insist that she had blood work done in February 2020  I suggested that she call her insurance company or check her insurance EOB's to find out what lab billed her for labs in February  I told her that I would be happy to contact the lab to obtain her results if she is able to tell me what lab to call  I already called [facility] and no labs were billed in February  She then proceeded to complain about the My Chart Website stating that her office visit from February is not in there  She wants me to know that the [facility] web site tells her when her appts and labs are due, gives results and gives her all the information that she needs to manage her care  She asked what her OV note from February says  I went through the note and gave her the conditions addressed and highlighted what was covered at the visit  I included Dr Carole Puente statement that No lab results were available for review  She again in disgust chuckled stating that she can't believe that she was not contacted that we did not get the results  She also claims that results were reviewed with her at that visit and this must have been misinformation even though his note says the opposite  I told her that the last lab results in her chart were from August 2019 and these were most likely the results that were referenced IF MENTIONED at the visit  She continued to chuckle and laugh in disgust at our handling of her information and the information available in My Chart  She spoke in a condescending manner throughout the entire call  I suggested that she contact the MY Chart support to give her feedback  I will mail the open lab orders to her home address at her request   Patient address was verified with the patient

## 2020-07-28 NOTE — TELEPHONE ENCOUNTER
In reviewing my previous office visit notes as well as orders placed, she had no lab orders placed following her appointment with me in 08/2019 however I did place lab orders after her appointment with me in February 2020 to be done prior to her next appointment which is in September 2020

## 2020-08-06 ENCOUNTER — APPOINTMENT (OUTPATIENT)
Dept: LAB | Age: 55
End: 2020-08-06
Payer: COMMERCIAL

## 2020-08-06 DIAGNOSIS — J30.1 NON-SEASONAL ALLERGIC RHINITIS DUE TO POLLEN: ICD-10-CM

## 2020-08-06 DIAGNOSIS — Z13.220 LIPID SCREENING: ICD-10-CM

## 2020-08-06 DIAGNOSIS — E03.8 HYPOTHYROIDISM DUE TO HASHIMOTO'S THYROIDITIS: ICD-10-CM

## 2020-08-06 DIAGNOSIS — I10 ESSENTIAL HYPERTENSION: ICD-10-CM

## 2020-08-06 DIAGNOSIS — D75.839 THROMBOCYTOSIS: ICD-10-CM

## 2020-08-06 DIAGNOSIS — E06.3 HYPOTHYROIDISM DUE TO HASHIMOTO'S THYROIDITIS: ICD-10-CM

## 2020-08-06 DIAGNOSIS — Z12.11 SCREENING FOR MALIGNANT NEOPLASM OF COLON: ICD-10-CM

## 2020-08-06 LAB
ALBUMIN SERPL BCP-MCNC: 3.8 G/DL (ref 3.5–5)
ALP SERPL-CCNC: 107 U/L (ref 46–116)
ALT SERPL W P-5'-P-CCNC: 23 U/L (ref 12–78)
ANION GAP SERPL CALCULATED.3IONS-SCNC: 7 MMOL/L (ref 4–13)
AST SERPL W P-5'-P-CCNC: 16 U/L (ref 5–45)
BILIRUB SERPL-MCNC: 0.38 MG/DL (ref 0.2–1)
BUN SERPL-MCNC: 13 MG/DL (ref 5–25)
CALCIUM SERPL-MCNC: 9.1 MG/DL (ref 8.3–10.1)
CHLORIDE SERPL-SCNC: 105 MMOL/L (ref 100–108)
CHOLEST SERPL-MCNC: 204 MG/DL (ref 50–200)
CO2 SERPL-SCNC: 25 MMOL/L (ref 21–32)
CREAT SERPL-MCNC: 1.05 MG/DL (ref 0.6–1.3)
ERYTHROCYTE [DISTWIDTH] IN BLOOD BY AUTOMATED COUNT: 12.3 % (ref 11.6–15.1)
GFR SERPL CREATININE-BSD FRML MDRD: 61 ML/MIN/1.73SQ M
GLUCOSE P FAST SERPL-MCNC: 106 MG/DL (ref 65–99)
HCT VFR BLD AUTO: 45.2 % (ref 34.8–46.1)
HDLC SERPL-MCNC: 57 MG/DL
HGB BLD-MCNC: 15.5 G/DL (ref 11.5–15.4)
LDLC SERPL CALC-MCNC: 131 MG/DL (ref 0–100)
MCH RBC QN AUTO: 31.6 PG (ref 26.8–34.3)
MCHC RBC AUTO-ENTMCNC: 34.3 G/DL (ref 31.4–37.4)
MCV RBC AUTO: 92 FL (ref 82–98)
NONHDLC SERPL-MCNC: 147 MG/DL
PLATELET # BLD AUTO: 413 THOUSANDS/UL (ref 149–390)
PMV BLD AUTO: 9.5 FL (ref 8.9–12.7)
POTASSIUM SERPL-SCNC: 4.4 MMOL/L (ref 3.5–5.3)
PROT SERPL-MCNC: 7.3 G/DL (ref 6.4–8.2)
RBC # BLD AUTO: 4.9 MILLION/UL (ref 3.81–5.12)
SODIUM SERPL-SCNC: 137 MMOL/L (ref 136–145)
T4 FREE SERPL-MCNC: 1.06 NG/DL (ref 0.76–1.46)
TRIGL SERPL-MCNC: 79 MG/DL
TSH SERPL DL<=0.05 MIU/L-ACNC: 4.79 UIU/ML (ref 0.36–3.74)
WBC # BLD AUTO: 9.2 THOUSAND/UL (ref 4.31–10.16)

## 2020-08-06 PROCEDURE — 80061 LIPID PANEL: CPT

## 2020-08-06 PROCEDURE — 84439 ASSAY OF FREE THYROXINE: CPT

## 2020-08-06 PROCEDURE — 84443 ASSAY THYROID STIM HORMONE: CPT

## 2020-08-06 PROCEDURE — 85027 COMPLETE CBC AUTOMATED: CPT

## 2020-08-06 PROCEDURE — 80053 COMPREHEN METABOLIC PANEL: CPT

## 2020-08-06 PROCEDURE — 36415 COLL VENOUS BLD VENIPUNCTURE: CPT

## 2020-08-10 ENCOUNTER — TELEPHONE (OUTPATIENT)
Dept: INTERNAL MEDICINE CLINIC | Facility: CLINIC | Age: 55
End: 2020-08-10

## 2020-08-10 DIAGNOSIS — E03.8 HYPOTHYROIDISM DUE TO HASHIMOTO'S THYROIDITIS: ICD-10-CM

## 2020-08-10 DIAGNOSIS — E06.3 HYPOTHYROIDISM DUE TO HASHIMOTO'S THYROIDITIS: ICD-10-CM

## 2020-08-10 RX ORDER — LEVOTHYROXINE SODIUM 0.1 MG/1
TABLET ORAL
Qty: 30 TABLET | Refills: 0 | Status: SHIPPED | OUTPATIENT
Start: 2020-08-10 | End: 2020-09-03 | Stop reason: SDUPTHER

## 2020-08-10 NOTE — TELEPHONE ENCOUNTER
Patient calling in regards to her lab work that she had completed on 8/6/20  She stated that she saw her TSH is out of range and she didn't know if her meds were going to be adjusted or not  She stated that she is just about out of her medication and would need a refill with whatever dose was suitable

## 2020-08-10 NOTE — TELEPHONE ENCOUNTER
Noted elevated TSH  Increase levothyroxine to 100mcg daily  Only #30 sent    Keep appt 9/1 with Dr Jesus Files

## 2020-09-01 ENCOUNTER — OFFICE VISIT (OUTPATIENT)
Dept: INTERNAL MEDICINE CLINIC | Facility: CLINIC | Age: 55
End: 2020-09-01
Payer: COMMERCIAL

## 2020-09-01 VITALS
BODY MASS INDEX: 25.91 KG/M2 | TEMPERATURE: 98.5 F | HEART RATE: 92 BPM | WEIGHT: 146.2 LBS | SYSTOLIC BLOOD PRESSURE: 108 MMHG | OXYGEN SATURATION: 97 % | DIASTOLIC BLOOD PRESSURE: 80 MMHG | HEIGHT: 63 IN

## 2020-09-01 DIAGNOSIS — J30.1 NON-SEASONAL ALLERGIC RHINITIS DUE TO POLLEN: ICD-10-CM

## 2020-09-01 DIAGNOSIS — F17.200 TOBACCO USE DISORDER: ICD-10-CM

## 2020-09-01 DIAGNOSIS — I10 ESSENTIAL HYPERTENSION: Primary | ICD-10-CM

## 2020-09-01 DIAGNOSIS — D75.839 THROMBOCYTOSIS: ICD-10-CM

## 2020-09-01 DIAGNOSIS — E03.9 ACQUIRED HYPOTHYROIDISM: ICD-10-CM

## 2020-09-01 PROCEDURE — 3079F DIAST BP 80-89 MM HG: CPT | Performed by: INTERNAL MEDICINE

## 2020-09-01 PROCEDURE — 99214 OFFICE O/P EST MOD 30 MIN: CPT | Performed by: INTERNAL MEDICINE

## 2020-09-01 PROCEDURE — 4004F PT TOBACCO SCREEN RCVD TLK: CPT | Performed by: INTERNAL MEDICINE

## 2020-09-01 RX ORDER — AMLODIPINE BESYLATE 2.5 MG/1
2.5 TABLET ORAL DAILY
Qty: 30 TABLET | Refills: 5
Start: 2020-09-01 | End: 2021-01-29 | Stop reason: SDUPTHER

## 2020-09-01 NOTE — PROGRESS NOTES
Assessment/Plan:    Hypothyroidism  Continue levothyroxine 100 mcg daily  Will recheck TSH in 2 weeks  Non-seasonal allergic rhinitis due to pollen  Controlled  Continue zyrtec and flonase  Essential hypertension  Continue lisinopril 40 mg daily and will decrease amlodipine to 2 5 mg daily  Thrombocytosis (HCC)  Stable, continue to trend  Diagnoses and all orders for this visit:    Essential hypertension  -     amLODIPine (NORVASC) 2 5 mg tablet; Take 1 tablet (2 5 mg total) by mouth daily    Acquired hypothyroidism  -     TSH, 3rd generation with Free T4 reflex; Future    Non-seasonal allergic rhinitis due to pollen    Tobacco use disorder    Thrombocytosis (HCC)          BMI Counseling: Body mass index is 25 9 kg/m²  The BMI is above normal  Nutrition recommendations include decreasing portion sizes, encouraging healthy choices of fruits and vegetables, decreasing fast food intake, consuming healthier snacks, limiting drinks that contain sugar, moderation in carbohydrate intake, increasing intake of lean protein, reducing intake of saturated and trans fat and reducing intake of cholesterol  Exercise recommendations include moderate physical activity 150 minutes/week and exercising 3-5 times per week  No pharmacotherapy was ordered  Patient referred to PCP due to patient being overweight  Depression Screening and Follow-up Plan: Clincally patient does not have depression  No treatment is required  Patient advised to follow-up with PCP for further management  Tobacco Cessation Counseling: Tobacco cessation counseling was provided  The patient is sincerely urged to quit consumption of tobacco  She is not ready to quit tobacco  Medication options and side effects of medication discussed  Patient refused medication  Time spent during encounter: 25 minutes (counseling, reviewing medications, and discussing treatment and plan)    Subjective:      Patient ID: Tad Laurie is a 50 y o  female  Chief Complaint   Patient presents with    Follow-up     Patient is here for f/u HTN and Hyporthyroidism and review blood work  Pt does not have any new medical concerns  50year old female is seen today for follow up of chronic conditions  Labs reviewed today, lipid panel, CBC, CMP  TSH was slightly elevated with a free T4 of 1 04  She had been feeling fatigue and had her levothyroxine increased from 88 mcg daily to 100 mcg daily  Since increasing levothyroxine, she is feeling better with more energy  Regarding blood pressure management, she has been compliant with lisinopril 40 mg daily and amlodipine 5 mg daily however has been experiencing symptoms of orthostatic hypotension  She drinks approximately 60-80 oz of water a day  Colonoscopy scheduled for October 2, 2020  Mammogram is due  Hypertension   This is a chronic problem  The current episode started more than 1 year ago  The problem is unchanged  The problem is controlled  Associated symptoms include orthopnea  Pertinent negatives include no chest pain, headaches, palpitations or shortness of breath  Past treatments include beta blockers and ACE inhibitors  The current treatment provides moderate improvement  There are no compliance problems  Identifiable causes of hypertension include a thyroid problem  Thyroid Problem   Presents for follow-up visit  Symptoms include fatigue  Patient reports no cold intolerance, constipation, diaphoresis, diarrhea, heat intolerance or palpitations  The symptoms have been improving  The following portions of the patient's history were reviewed and updated as appropriate: allergies, current medications, past family history, past medical history, past social history, past surgical history and problem list     Review of Systems   Constitutional: Positive for fatigue  Negative for activity change, appetite change, chills, diaphoresis and fever     HENT: Negative for congestion, postnasal drip, rhinorrhea, sinus pressure, sinus pain, sneezing and sore throat  Eyes: Negative for visual disturbance  Respiratory: Negative for apnea, cough, choking, chest tightness, shortness of breath and wheezing  Cardiovascular: Positive for orthopnea  Negative for chest pain, palpitations and leg swelling  Gastrointestinal: Negative for abdominal distention, abdominal pain, anal bleeding, blood in stool, constipation, diarrhea, nausea and vomiting  Endocrine: Negative for cold intolerance and heat intolerance  Genitourinary: Negative for difficulty urinating, dysuria and hematuria  Musculoskeletal: Negative  Skin: Negative  Neurological: Negative for dizziness, weakness, light-headedness, numbness and headaches  Hematological: Negative for adenopathy  Psychiatric/Behavioral: Negative for agitation, sleep disturbance and suicidal ideas  All other systems reviewed and are negative  Past Medical History:   Diagnosis Date    Asthma     Disease of thyroid gland     Hypertension     Migraine headache          Current Outpatient Medications:     cetirizine (ZyrTEC) 10 mg tablet, Take 1 tablet (10 mg total) by mouth as needed for allergies or rhinitis, Disp: , Rfl:     clindamycin (CLINDAGEL) 1 % gel, Apply topically as needed, Disp: , Rfl:     fluticasone (FLONASE) 50 mcg/act nasal spray, 2 sprays into each nostril as needed, Disp: , Rfl:     levothyroxine 100 mcg tablet, Take 1 tablet in the AM on an empty stomach, 45 minutes prior to taking food or other medications  , Disp: 30 tablet, Rfl: 0    lisinopril (ZESTRIL) 40 mg tablet, Take 1 tablet (40 mg total) by mouth daily, Disp: 90 tablet, Rfl: 1    tretinoin (RETIN-A) 0 025 % cream, Apply 1 application topically as needed  , Disp: , Rfl:     amLODIPine (NORVASC) 2 5 mg tablet, Take 1 tablet (2 5 mg total) by mouth daily, Disp: 30 tablet, Rfl: 5    Allergies   Allergen Reactions    Nickel     Other [place]  Dust mites        Social History   Past Surgical History:   Procedure Laterality Date    DILATION AND CURETTAGE OF UTERUS      several    ENDOMETRIAL ABLATION      TUBAL LIGATION  1997    WISDOM TOOTH EXTRACTION  1985     Family History   Problem Relation Age of Onset    Diabetes Mother     Hypertension Mother     Atrial fibrillation Mother     Kidney cancer Mother     Stroke Father     Heart attack Father     Stroke Maternal Grandmother     Diabetes Maternal Grandmother     Stroke Paternal Grandmother     Diabetes Paternal Grandmother        Objective:  /80 (BP Location: Left arm, Patient Position: Sitting, Cuff Size: Standard)   Pulse 92   Temp 98 5 °F (36 9 °C) (Temporal)   Ht 5' 3" (1 6 m)   Wt 66 3 kg (146 lb 3 2 oz)   SpO2 97%   BMI 25 90 kg/m²     Recent Results (from the past 1344 hour(s))   CBC    Collection Time: 08/06/20  7:43 AM   Result Value Ref Range    WBC 9 20 4  31 - 10 16 Thousand/uL    RBC 4 90 3 81 - 5 12 Million/uL    Hemoglobin 15 5 (H) 11 5 - 15 4 g/dL    Hematocrit 45 2 34 8 - 46 1 %    MCV 92 82 - 98 fL    MCH 31 6 26 8 - 34 3 pg    MCHC 34 3 31 4 - 37 4 g/dL    RDW 12 3 11 6 - 15 1 %    Platelets 880 (H) 935 - 390 Thousands/uL    MPV 9 5 8 9 - 12 7 fL   Comprehensive metabolic panel    Collection Time: 08/06/20  7:43 AM   Result Value Ref Range    Sodium 137 136 - 145 mmol/L    Potassium 4 4 3 5 - 5 3 mmol/L    Chloride 105 100 - 108 mmol/L    CO2 25 21 - 32 mmol/L    ANION GAP 7 4 - 13 mmol/L    BUN 13 5 - 25 mg/dL    Creatinine 1 05 0 60 - 1 30 mg/dL    Glucose, Fasting 106 (H) 65 - 99 mg/dL    Calcium 9 1 8 3 - 10 1 mg/dL    AST 16 5 - 45 U/L    ALT 23 12 - 78 U/L    Alkaline Phosphatase 107 46 - 116 U/L    Total Protein 7 3 6 4 - 8 2 g/dL    Albumin 3 8 3 5 - 5 0 g/dL    Total Bilirubin 0 38 0 20 - 1 00 mg/dL    eGFR 61 ml/min/1 73sq m   Lipid panel    Collection Time: 08/06/20  7:43 AM   Result Value Ref Range    Cholesterol 204 (H) 50 - 200 mg/dL Triglycerides 79 <=150 mg/dL    HDL, Direct 57 >=40 mg/dL    LDL Calculated 131 (H) 0 - 100 mg/dL    Non-HDL-Chol (CHOL-HDL) 147 mg/dl   TSH, 3rd generation with Free T4 reflex    Collection Time: 08/06/20  7:43 AM   Result Value Ref Range    TSH 3RD GENERATON 4 790 (H) 0 358 - 3 740 uIU/mL   T4, free    Collection Time: 08/06/20  7:43 AM   Result Value Ref Range    Free T4 1 06 0 76 - 1 46 ng/dL            Physical Exam  Vitals signs and nursing note reviewed  Constitutional:       General: She is not in acute distress  Appearance: She is well-developed  She is not diaphoretic  HENT:      Head: Normocephalic and atraumatic  Eyes:      General:         Right eye: No discharge  Left eye: No discharge  Conjunctiva/sclera: Conjunctivae normal       Pupils: Pupils are equal, round, and reactive to light  Neck:      Musculoskeletal: Normal range of motion and neck supple  Thyroid: No thyromegaly  Vascular: No JVD  Cardiovascular:      Rate and Rhythm: Normal rate and regular rhythm  Heart sounds: Normal heart sounds  No murmur  No friction rub  No gallop  Pulmonary:      Effort: Pulmonary effort is normal  No respiratory distress  Breath sounds: Normal breath sounds  No wheezing or rales  Chest:      Chest wall: No tenderness  Abdominal:      General: There is no distension  Palpations: Abdomen is soft  Tenderness: There is no abdominal tenderness  Musculoskeletal: Normal range of motion  General: No tenderness or deformity  Lymphadenopathy:      Cervical: No cervical adenopathy  Skin:     General: Skin is warm and dry  Coloration: Skin is not pale  Findings: No erythema or rash  Neurological:      Mental Status: She is alert and oriented to person, place, and time  Cranial Nerves: No cranial nerve deficit  Coordination: Coordination normal    Psychiatric:         Behavior: Behavior normal          Thought Content:  Thought content normal          Judgment: Judgment normal

## 2020-09-03 DIAGNOSIS — E06.3 HYPOTHYROIDISM DUE TO HASHIMOTO'S THYROIDITIS: ICD-10-CM

## 2020-09-03 DIAGNOSIS — E03.8 HYPOTHYROIDISM DUE TO HASHIMOTO'S THYROIDITIS: ICD-10-CM

## 2020-09-03 RX ORDER — LEVOTHYROXINE SODIUM 0.1 MG/1
TABLET ORAL
Qty: 30 TABLET | Refills: 0 | Status: SHIPPED | OUTPATIENT
Start: 2020-09-03 | End: 2020-09-17 | Stop reason: SDUPTHER

## 2020-09-16 ENCOUNTER — APPOINTMENT (OUTPATIENT)
Dept: URGENT CARE | Facility: CLINIC | Age: 55
End: 2020-09-16

## 2020-09-16 DIAGNOSIS — E03.9 ACQUIRED HYPOTHYROIDISM: ICD-10-CM

## 2020-09-16 LAB — TSH SERPL DL<=0.05 MIU/L-ACNC: 1.1 UIU/ML (ref 0.36–3.74)

## 2020-09-16 PROCEDURE — 84443 ASSAY THYROID STIM HORMONE: CPT | Performed by: INTERNAL MEDICINE

## 2020-09-17 DIAGNOSIS — E06.3 HYPOTHYROIDISM DUE TO HASHIMOTO'S THYROIDITIS: ICD-10-CM

## 2020-09-17 DIAGNOSIS — E03.8 HYPOTHYROIDISM DUE TO HASHIMOTO'S THYROIDITIS: ICD-10-CM

## 2020-09-17 RX ORDER — LEVOTHYROXINE SODIUM 0.1 MG/1
TABLET ORAL
Qty: 90 TABLET | Refills: 1 | Status: SHIPPED | OUTPATIENT
Start: 2020-09-17 | End: 2021-01-29 | Stop reason: SDUPTHER

## 2020-10-01 ENCOUNTER — ANESTHESIA EVENT (OUTPATIENT)
Dept: GASTROENTEROLOGY | Facility: HOSPITAL | Age: 55
End: 2020-10-01

## 2020-10-01 PROBLEM — F41.9 ANXIETY: Status: ACTIVE | Noted: 2020-10-01

## 2020-10-01 PROBLEM — R51.9 HEADACHE: Status: ACTIVE | Noted: 2020-10-01

## 2020-10-02 ENCOUNTER — HOSPITAL ENCOUNTER (OUTPATIENT)
Dept: GASTROENTEROLOGY | Facility: HOSPITAL | Age: 55
Setting detail: OUTPATIENT SURGERY
Discharge: HOME | End: 2020-10-02
Attending: COLON & RECTAL SURGERY
Payer: COMMERCIAL

## 2020-10-02 ENCOUNTER — ANESTHESIA (OUTPATIENT)
Dept: GASTROENTEROLOGY | Facility: HOSPITAL | Age: 55
End: 2020-10-02

## 2020-10-02 VITALS
WEIGHT: 142 LBS | OXYGEN SATURATION: 96 % | DIASTOLIC BLOOD PRESSURE: 60 MMHG | TEMPERATURE: 96.6 F | SYSTOLIC BLOOD PRESSURE: 138 MMHG | BODY MASS INDEX: 25.16 KG/M2 | RESPIRATION RATE: 60 BRPM | HEIGHT: 63 IN | HEART RATE: 68 BPM

## 2020-10-02 VITALS — HEART RATE: 78 BPM

## 2020-10-02 DIAGNOSIS — Z12.11 ENCOUNTER FOR SCREENING FOR MALIGNANT NEOPLASM OF COLON: ICD-10-CM

## 2020-10-02 PROCEDURE — 88305 TISSUE EXAM BY PATHOLOGIST: CPT | Performed by: PATHOLOGY

## 2020-10-02 RX ORDER — PROPOFOL 10 MG/ML
INJECTION, EMULSION INTRAVENOUS AS NEEDED
Status: DISCONTINUED | OUTPATIENT
Start: 2020-10-02 | End: 2020-10-02

## 2020-10-02 RX ORDER — SODIUM CHLORIDE 9 MG/ML
75 INJECTION, SOLUTION INTRAVENOUS CONTINUOUS
Status: DISCONTINUED | OUTPATIENT
Start: 2020-10-02 | End: 2020-10-06 | Stop reason: HOSPADM

## 2020-10-02 RX ORDER — LIDOCAINE HYDROCHLORIDE 10 MG/ML
INJECTION, SOLUTION EPIDURAL; INFILTRATION; INTRACAUDAL; PERINEURAL AS NEEDED
Status: DISCONTINUED | OUTPATIENT
Start: 2020-10-02 | End: 2020-10-02

## 2020-10-02 RX ADMIN — PROPOFOL 50 MG: 10 INJECTION, EMULSION INTRAVENOUS at 12:18

## 2020-10-02 RX ADMIN — PROPOFOL 50 MG: 10 INJECTION, EMULSION INTRAVENOUS at 12:16

## 2020-10-02 RX ADMIN — PROPOFOL 50 MG: 10 INJECTION, EMULSION INTRAVENOUS at 12:13

## 2020-10-02 RX ADMIN — PROPOFOL 50 MG: 10 INJECTION, EMULSION INTRAVENOUS at 12:11

## 2020-10-02 RX ADMIN — SODIUM CHLORIDE 75 ML/HR: 0.9 INJECTION, SOLUTION INTRAVENOUS at 11:16

## 2020-10-02 RX ADMIN — PROPOFOL 100 MG: 10 INJECTION, EMULSION INTRAVENOUS at 12:10

## 2020-10-02 RX ADMIN — LIDOCAINE HYDROCHLORIDE 50 MG: 10 INJECTION, SOLUTION EPIDURAL; INFILTRATION; INTRACAUDAL; PERINEURAL at 12:10

## 2020-11-20 DIAGNOSIS — I10 HTN (HYPERTENSION), BENIGN: ICD-10-CM

## 2020-11-20 RX ORDER — LISINOPRIL 40 MG/1
40 TABLET ORAL DAILY
Qty: 90 TABLET | Refills: 0 | Status: SHIPPED | OUTPATIENT
Start: 2020-11-20 | End: 2021-01-29 | Stop reason: SDUPTHER

## 2021-01-29 DIAGNOSIS — I10 ESSENTIAL HYPERTENSION: ICD-10-CM

## 2021-01-29 DIAGNOSIS — E03.8 HYPOTHYROIDISM DUE TO HASHIMOTO'S THYROIDITIS: ICD-10-CM

## 2021-01-29 DIAGNOSIS — I10 HTN (HYPERTENSION), BENIGN: ICD-10-CM

## 2021-01-29 DIAGNOSIS — E06.3 HYPOTHYROIDISM DUE TO HASHIMOTO'S THYROIDITIS: ICD-10-CM

## 2021-01-29 RX ORDER — AMLODIPINE BESYLATE 2.5 MG/1
2.5 TABLET ORAL DAILY
Qty: 90 TABLET | Refills: 0 | Status: SHIPPED | OUTPATIENT
Start: 2021-01-29 | End: 2021-04-26 | Stop reason: SDUPTHER

## 2021-01-29 RX ORDER — LISINOPRIL 40 MG/1
40 TABLET ORAL DAILY
Qty: 90 TABLET | Refills: 0 | Status: SHIPPED | OUTPATIENT
Start: 2021-01-29 | End: 2021-04-26 | Stop reason: SDUPTHER

## 2021-01-29 RX ORDER — LEVOTHYROXINE SODIUM 0.1 MG/1
TABLET ORAL
Qty: 90 TABLET | Refills: 0 | Status: SHIPPED | OUTPATIENT
Start: 2021-01-29 | End: 2021-04-26 | Stop reason: SDUPTHER

## 2021-03-23 ENCOUNTER — OFFICE VISIT (OUTPATIENT)
Dept: INTERNAL MEDICINE CLINIC | Facility: CLINIC | Age: 56
End: 2021-03-23
Payer: COMMERCIAL

## 2021-03-23 VITALS
TEMPERATURE: 98.9 F | HEIGHT: 63 IN | BODY MASS INDEX: 26.86 KG/M2 | DIASTOLIC BLOOD PRESSURE: 80 MMHG | OXYGEN SATURATION: 98 % | HEART RATE: 89 BPM | WEIGHT: 151.6 LBS | SYSTOLIC BLOOD PRESSURE: 118 MMHG

## 2021-03-23 DIAGNOSIS — E03.9 ACQUIRED HYPOTHYROIDISM: Primary | ICD-10-CM

## 2021-03-23 DIAGNOSIS — J30.1 NON-SEASONAL ALLERGIC RHINITIS DUE TO POLLEN: ICD-10-CM

## 2021-03-23 DIAGNOSIS — I10 ESSENTIAL HYPERTENSION: ICD-10-CM

## 2021-03-23 DIAGNOSIS — F41.9 ANXIETY: ICD-10-CM

## 2021-03-23 DIAGNOSIS — D75.839 THROMBOCYTOSIS: ICD-10-CM

## 2021-03-23 PROBLEM — R51.9 HEADACHE: Status: RESOLVED | Noted: 2020-10-01 | Resolved: 2021-03-23

## 2021-03-23 PROCEDURE — 3008F BODY MASS INDEX DOCD: CPT | Performed by: INTERNAL MEDICINE

## 2021-03-23 PROCEDURE — 3725F SCREEN DEPRESSION PERFORMED: CPT | Performed by: INTERNAL MEDICINE

## 2021-03-23 PROCEDURE — 4004F PT TOBACCO SCREEN RCVD TLK: CPT | Performed by: INTERNAL MEDICINE

## 2021-03-23 PROCEDURE — 99214 OFFICE O/P EST MOD 30 MIN: CPT | Performed by: INTERNAL MEDICINE

## 2021-03-23 NOTE — PROGRESS NOTES
Assessment/Plan:    Hypothyroidism  Controlled  Continue levothyroxine 100 mcg daily  Non-seasonal allergic rhinitis due to pollen  Controlled  Continue Zyrtec and Flonase as needed  Essential hypertension  Continue amlodipine 2 5 mg daily and lisinopril 40 mg daily  Anxiety  Stable  Continue to monitor off SSRI/SNRI therapy at this time  Diagnoses and all orders for this visit:    Acquired hypothyroidism  -     CBC; Future  -     Comprehensive metabolic panel; Future  -     Lipid panel; Future  -     TSH, 3rd generation with Free T4 reflex; Future    Essential hypertension  -     CBC; Future  -     Comprehensive metabolic panel; Future  -     Lipid panel; Future  -     TSH, 3rd generation with Free T4 reflex; Future    Thrombocytosis (HCC)  -     CBC; Future    Anxiety    Non-seasonal allergic rhinitis due to pollen          BMI Counseling: Body mass index is 26 85 kg/m²  The BMI is above normal  Nutrition recommendations include encouraging healthy choices of fruits and vegetables, decreasing fast food intake, consuming healthier snacks, limiting drinks that contain sugar, moderation in carbohydrate intake, increasing intake of lean protein, reducing intake of saturated and trans fat and reducing intake of cholesterol  Exercise recommendations include moderate physical activity 150 minutes/week and exercising 3-5 times per week  No pharmacotherapy was ordered  Patient referred to PCP       Depression Screening and Follow-up Plan: Patient's depression screening was positive with a PHQ-2 score of 0  Clincally patient does not have depression  No treatment is required  Patient advised to follow-up with PCP for further management  Tobacco Cessation Counseling: Tobacco cessation counseling was provided  The patient is sincerely urged to quit consumption of tobacco  She is not ready to quit tobacco  Medication options and side effects of medication discussed  Patient refused medication  Time spent during encounter: 25 minutes (counseling, reviewing medications, and discussing treatment and plan)    Subjective:      Patient ID: Marci Graham is a 49 y o  female  Chief Complaint   Patient presents with    Follow-up     Patient is here for f/u HTN and Hyportyroidism  Pt is concern about her gain weight  49year old female seen today follow-up of chronic conditions  Laboratory studies reviewed today  She has no active complaints or concerns at this time  She has been compliant with medication regimen  Hypertension  This is a chronic problem  The current episode started more than 1 year ago  The problem is unchanged  The problem is controlled  Pertinent negatives include no chest pain, headaches, palpitations or shortness of breath  Past treatments include ACE inhibitors and calcium channel blockers  The current treatment provides moderate improvement  There are no compliance problems  Identifiable causes of hypertension include a thyroid problem  Thyroid Problem  Presents for follow-up visit  Patient reports no cold intolerance, constipation, diaphoresis, diarrhea, fatigue, heat intolerance or palpitations  The symptoms have been stable  The following portions of the patient's history were reviewed and updated as appropriate: allergies, current medications, past family history, past medical history, past social history, past surgical history and problem list     Review of Systems   Constitutional: Negative for activity change, appetite change, chills, diaphoresis, fatigue and fever  HENT: Negative for congestion, postnasal drip, rhinorrhea, sinus pressure, sinus pain, sneezing and sore throat  Eyes: Negative for visual disturbance  Respiratory: Negative for apnea, cough, choking, chest tightness, shortness of breath and wheezing  Cardiovascular: Negative for chest pain, palpitations and leg swelling     Gastrointestinal: Negative for abdominal distention, abdominal pain, anal bleeding, blood in stool, constipation, diarrhea, nausea and vomiting  Endocrine: Negative for cold intolerance and heat intolerance  Genitourinary: Negative for difficulty urinating, dysuria and hematuria  Musculoskeletal: Negative  Skin: Negative  Neurological: Negative for dizziness, weakness, light-headedness, numbness and headaches  Hematological: Negative for adenopathy  Psychiatric/Behavioral: Negative for agitation, sleep disturbance and suicidal ideas  All other systems reviewed and are negative  Past Medical History:   Diagnosis Date    Asthma     Disease of thyroid gland     Hypertension     Migraine headache          Current Outpatient Medications:     amLODIPine (NORVASC) 2 5 mg tablet, Take 1 tablet (2 5 mg total) by mouth daily, Disp: 90 tablet, Rfl: 0    cetirizine (ZyrTEC) 10 mg tablet, Take 1 tablet (10 mg total) by mouth as needed for allergies or rhinitis, Disp: , Rfl:     clindamycin (CLINDAGEL) 1 % gel, Apply topically as needed, Disp: , Rfl:     fluticasone (FLONASE) 50 mcg/act nasal spray, 2 sprays into each nostril as needed, Disp: , Rfl:     levothyroxine 100 mcg tablet, Take 1 tablet in the AM on an empty stomach, 45 minutes prior to taking food or other medications  , Disp: 90 tablet, Rfl: 0    lisinopril (ZESTRIL) 40 mg tablet, Take 1 tablet (40 mg total) by mouth daily, Disp: 90 tablet, Rfl: 0    tretinoin (RETIN-A) 0 025 % cream, Apply 1 application topically as needed  , Disp: , Rfl:     Allergies   Allergen Reactions    Nickel     Other      [place]  Dust mites        Social History   Past Surgical History:   Procedure Laterality Date    DILATION AND CURETTAGE OF UTERUS      several    ENDOMETRIAL ABLATION      LAPAROSCOPY      Abdominal    TUBAL LIGATION  1997    WISDOM TOOTH EXTRACTION  1985     Family History   Problem Relation Age of Onset    Diabetes Mother     Hypertension Mother     Atrial fibrillation Mother  Kidney cancer Mother     Stroke Father     Heart attack Father     Stroke Maternal Grandmother     Diabetes Maternal Grandmother     Stroke Paternal Grandmother     Diabetes Paternal Grandmother        Objective:  /80 (BP Location: Left arm, Patient Position: Sitting, Cuff Size: Standard)   Pulse 89   Temp 98 9 °F (37 2 °C) (Temporal)   Ht 5' 3" (1 6 m)   Wt 68 8 kg (151 lb 9 6 oz)   SpO2 98%   BMI 26 85 kg/m²     No results found for this or any previous visit (from the past 1344 hour(s))  Physical Exam  Vitals signs and nursing note reviewed  Constitutional:       General: She is not in acute distress  Appearance: She is well-developed  She is not diaphoretic  HENT:      Head: Normocephalic and atraumatic  Eyes:      General:         Right eye: No discharge  Left eye: No discharge  Conjunctiva/sclera: Conjunctivae normal       Pupils: Pupils are equal, round, and reactive to light  Neck:      Musculoskeletal: Normal range of motion and neck supple  Thyroid: No thyromegaly  Vascular: No JVD  Cardiovascular:      Rate and Rhythm: Normal rate and regular rhythm  Heart sounds: Normal heart sounds  No murmur  No friction rub  No gallop  Pulmonary:      Effort: Pulmonary effort is normal  No respiratory distress  Breath sounds: Normal breath sounds  No wheezing or rales  Chest:      Chest wall: No tenderness  Abdominal:      General: There is no distension  Palpations: Abdomen is soft  Tenderness: There is no abdominal tenderness  Musculoskeletal: Normal range of motion  General: No tenderness or deformity  Lymphadenopathy:      Cervical: No cervical adenopathy  Skin:     General: Skin is warm and dry  Coloration: Skin is not pale  Findings: No erythema or rash  Neurological:      Mental Status: She is alert and oriented to person, place, and time  Cranial Nerves: No cranial nerve deficit  Coordination: Coordination normal    Psychiatric:         Behavior: Behavior normal          Thought Content:  Thought content normal          Judgment: Judgment normal

## 2021-03-23 NOTE — ASSESSMENT & PLAN NOTE
1.  Do not eat or drink anything after 12 midnight prior to surgery. This includes no water, chewing gum or mints. 2.  Take the following pills with a small sip of water on the morning of surgery . 3. Aspirin, Ibuprofen, Advil, Naproxen, Vitamin E and other Anti-inflammatory products should be stopped for 5 days before surgery or as directed by your physician. 4.  Check with your doctor regarding stopping Plavix, Coumadin, Lovenox, Fragmin or other blood thinners. 5.  Do not smoke and do not drink alcoholic beverages 24 hours prior to surgery. This includes NA Beer. 6.  You may brush your teeth and gargle the morning of surgery. DO NOT SWALLOW WATER.  7.  You MUST make arrangements for a responsible adult to take you home after your surgery. You will not be allowed to leave alone or drive yourself home. It is strongly suggested someone stay with you the first 24 hours. Your surgery will be cancelled if you do not have a ride home. 8.  A parent/legal guardian must accompany a child scheduled for surgery and plan to stay at the hospital until the child is discharged. Please do not bring other children with you. 9.  Please wear simple, loose fitting clothing to the hospital.  Dorita Shaw not bring valuables ( money, credit cards, checkbooks, etc.)  Do not wear any makeup (including no eye makeup) or nail polish on your fingers or toes. 10.  Do not wear any jewelry or piercing on the day of surgery. All body piercing jewelry must be removed. 11.  If you have dentures, they will be removed before going to the OR; we will provide you a container. If you wear contact lenses or glasses, they will be removed; please bring a case for them. 12.  Please see your family doctor/pediatrician for a history & physical and/or concerning medications. Bring any test results/reports from your physician's office the day of surgery. 15.  Remember to bring Blood Bank Bracelet to the hospital on the day of surgery.   14.  If Controlled  Continue levothyroxine 100 mcg daily

## 2021-04-01 ENCOUNTER — APPOINTMENT (OUTPATIENT)
Dept: URGENT CARE | Facility: CLINIC | Age: 56
End: 2021-04-01

## 2021-04-01 DIAGNOSIS — D75.839 THROMBOCYTOSIS: ICD-10-CM

## 2021-04-01 DIAGNOSIS — E03.9 ACQUIRED HYPOTHYROIDISM: ICD-10-CM

## 2021-04-01 DIAGNOSIS — I10 ESSENTIAL HYPERTENSION: ICD-10-CM

## 2021-04-01 LAB
ALBUMIN SERPL BCP-MCNC: 3.9 G/DL (ref 3.5–5)
ALP SERPL-CCNC: 104 U/L (ref 46–116)
ALT SERPL W P-5'-P-CCNC: 26 U/L (ref 12–78)
ANION GAP SERPL CALCULATED.3IONS-SCNC: 9 MMOL/L (ref 4–13)
AST SERPL W P-5'-P-CCNC: 20 U/L (ref 5–45)
BILIRUB SERPL-MCNC: 0.31 MG/DL (ref 0.2–1)
BUN SERPL-MCNC: 17 MG/DL (ref 5–25)
CALCIUM SERPL-MCNC: 8.8 MG/DL (ref 8.3–10.1)
CHLORIDE SERPL-SCNC: 106 MMOL/L (ref 100–108)
CHOLEST SERPL-MCNC: 180 MG/DL (ref 50–200)
CO2 SERPL-SCNC: 20 MMOL/L (ref 21–32)
CREAT SERPL-MCNC: 1.02 MG/DL (ref 0.6–1.3)
ERYTHROCYTE [DISTWIDTH] IN BLOOD BY AUTOMATED COUNT: 12.3 % (ref 11.6–15.1)
GFR SERPL CREATININE-BSD FRML MDRD: 63 ML/MIN/1.73SQ M
GLUCOSE P FAST SERPL-MCNC: 99 MG/DL (ref 65–99)
HCT VFR BLD AUTO: 43.4 % (ref 34.8–46.1)
HDLC SERPL-MCNC: 60 MG/DL
HGB BLD-MCNC: 14.6 G/DL (ref 11.5–15.4)
LDLC SERPL CALC-MCNC: 109 MG/DL (ref 0–100)
MCH RBC QN AUTO: 31.5 PG (ref 26.8–34.3)
MCHC RBC AUTO-ENTMCNC: 33.6 G/DL (ref 31.4–37.4)
MCV RBC AUTO: 94 FL (ref 82–98)
NONHDLC SERPL-MCNC: 120 MG/DL
PLATELET # BLD AUTO: 397 THOUSANDS/UL (ref 149–390)
PMV BLD AUTO: 9.8 FL (ref 8.9–12.7)
POTASSIUM SERPL-SCNC: 3.8 MMOL/L (ref 3.5–5.3)
PROT SERPL-MCNC: 7.3 G/DL (ref 6.4–8.2)
RBC # BLD AUTO: 4.64 MILLION/UL (ref 3.81–5.12)
SODIUM SERPL-SCNC: 135 MMOL/L (ref 136–145)
TRIGL SERPL-MCNC: 53 MG/DL
TSH SERPL DL<=0.05 MIU/L-ACNC: 0.57 UIU/ML (ref 0.36–3.74)
WBC # BLD AUTO: 8.79 THOUSAND/UL (ref 4.31–10.16)

## 2021-04-01 PROCEDURE — 80053 COMPREHEN METABOLIC PANEL: CPT | Performed by: INTERNAL MEDICINE

## 2021-04-01 PROCEDURE — 84443 ASSAY THYROID STIM HORMONE: CPT | Performed by: INTERNAL MEDICINE

## 2021-04-01 PROCEDURE — 85027 COMPLETE CBC AUTOMATED: CPT | Performed by: INTERNAL MEDICINE

## 2021-04-01 PROCEDURE — 80061 LIPID PANEL: CPT | Performed by: INTERNAL MEDICINE

## 2021-04-08 DIAGNOSIS — I10 ESSENTIAL HYPERTENSION: ICD-10-CM

## 2021-04-08 RX ORDER — AMLODIPINE BESYLATE 2.5 MG/1
TABLET ORAL
Qty: 90 TABLET | Refills: 3 | OUTPATIENT
Start: 2021-04-08

## 2021-04-13 DIAGNOSIS — Z23 ENCOUNTER FOR IMMUNIZATION: ICD-10-CM

## 2021-04-26 DIAGNOSIS — I10 ESSENTIAL HYPERTENSION: ICD-10-CM

## 2021-04-26 DIAGNOSIS — E03.8 HYPOTHYROIDISM DUE TO HASHIMOTO'S THYROIDITIS: ICD-10-CM

## 2021-04-26 DIAGNOSIS — I10 HTN (HYPERTENSION), BENIGN: ICD-10-CM

## 2021-04-26 DIAGNOSIS — E06.3 HYPOTHYROIDISM DUE TO HASHIMOTO'S THYROIDITIS: ICD-10-CM

## 2021-04-26 RX ORDER — AMLODIPINE BESYLATE 2.5 MG/1
2.5 TABLET ORAL DAILY
Qty: 90 TABLET | Refills: 1 | Status: SHIPPED | OUTPATIENT
Start: 2021-04-26 | End: 2021-10-28 | Stop reason: SDUPTHER

## 2021-04-26 RX ORDER — LEVOTHYROXINE SODIUM 0.1 MG/1
TABLET ORAL
Qty: 90 TABLET | Refills: 1 | Status: SHIPPED | OUTPATIENT
Start: 2021-04-26 | End: 2021-10-28 | Stop reason: SDUPTHER

## 2021-04-26 RX ORDER — LISINOPRIL 40 MG/1
40 TABLET ORAL DAILY
Qty: 90 TABLET | Refills: 1 | Status: SHIPPED | OUTPATIENT
Start: 2021-04-26 | End: 2021-10-28 | Stop reason: SDUPTHER

## 2021-10-28 ENCOUNTER — OFFICE VISIT (OUTPATIENT)
Dept: INTERNAL MEDICINE CLINIC | Facility: CLINIC | Age: 56
End: 2021-10-28
Payer: COMMERCIAL

## 2021-10-28 VITALS
TEMPERATURE: 98.2 F | SYSTOLIC BLOOD PRESSURE: 122 MMHG | HEIGHT: 63 IN | DIASTOLIC BLOOD PRESSURE: 70 MMHG | WEIGHT: 147.4 LBS | HEART RATE: 86 BPM | OXYGEN SATURATION: 100 % | BODY MASS INDEX: 26.12 KG/M2

## 2021-10-28 DIAGNOSIS — Z00.00 ANNUAL PHYSICAL EXAM: ICD-10-CM

## 2021-10-28 DIAGNOSIS — D75.839 THROMBOCYTOSIS: ICD-10-CM

## 2021-10-28 DIAGNOSIS — E03.8 HYPOTHYROIDISM DUE TO HASHIMOTO'S THYROIDITIS: ICD-10-CM

## 2021-10-28 DIAGNOSIS — J30.1 NON-SEASONAL ALLERGIC RHINITIS DUE TO POLLEN: ICD-10-CM

## 2021-10-28 DIAGNOSIS — F17.200 TOBACCO USE DISORDER: ICD-10-CM

## 2021-10-28 DIAGNOSIS — F41.9 ANXIETY: ICD-10-CM

## 2021-10-28 DIAGNOSIS — E06.3 HYPOTHYROIDISM DUE TO HASHIMOTO'S THYROIDITIS: ICD-10-CM

## 2021-10-28 DIAGNOSIS — I10 ESSENTIAL HYPERTENSION: Primary | ICD-10-CM

## 2021-10-28 DIAGNOSIS — I10 HTN (HYPERTENSION), BENIGN: ICD-10-CM

## 2021-10-28 PROCEDURE — 99214 OFFICE O/P EST MOD 30 MIN: CPT | Performed by: INTERNAL MEDICINE

## 2021-10-28 PROCEDURE — 99396 PREV VISIT EST AGE 40-64: CPT | Performed by: INTERNAL MEDICINE

## 2021-10-28 RX ORDER — AMLODIPINE BESYLATE 2.5 MG/1
2.5 TABLET ORAL DAILY
Qty: 90 TABLET | Refills: 1 | Status: SHIPPED | OUTPATIENT
Start: 2021-10-28 | End: 2022-04-26 | Stop reason: SDUPTHER

## 2021-10-28 RX ORDER — LEVOTHYROXINE SODIUM 0.1 MG/1
TABLET ORAL
Qty: 90 TABLET | Refills: 1 | Status: SHIPPED | OUTPATIENT
Start: 2021-10-28 | End: 2022-05-04 | Stop reason: SDUPTHER

## 2021-10-28 RX ORDER — LISINOPRIL 40 MG/1
40 TABLET ORAL DAILY
Qty: 90 TABLET | Refills: 1 | Status: SHIPPED | OUTPATIENT
Start: 2021-10-28 | End: 2022-05-04 | Stop reason: SDUPTHER

## 2022-01-05 ENCOUNTER — TELEMEDICINE (OUTPATIENT)
Dept: INTERNAL MEDICINE CLINIC | Facility: OTHER | Age: 57
End: 2022-01-05
Payer: COMMERCIAL

## 2022-01-05 VITALS — HEIGHT: 63 IN | TEMPERATURE: 96.8 F | WEIGHT: 147 LBS | BODY MASS INDEX: 26.05 KG/M2

## 2022-01-05 DIAGNOSIS — B34.9 VIRAL INFECTION, UNSPECIFIED: ICD-10-CM

## 2022-01-05 DIAGNOSIS — Z20.822 EXPOSURE TO COVID-19 VIRUS: ICD-10-CM

## 2022-01-05 PROCEDURE — 4004F PT TOBACCO SCREEN RCVD TLK: CPT | Performed by: INTERNAL MEDICINE

## 2022-01-05 PROCEDURE — 3008F BODY MASS INDEX DOCD: CPT | Performed by: INTERNAL MEDICINE

## 2022-01-05 PROCEDURE — 99213 OFFICE O/P EST LOW 20 MIN: CPT | Performed by: INTERNAL MEDICINE

## 2022-01-05 NOTE — PROGRESS NOTES
COVID-19 Outpatient Progress Note    Assessment/Plan:    Problem List Items Addressed This Visit        Other    Viral infection, unspecified     Given recent exposure and symptomatology, will test for COVID-19 via PCR  Discussed quarantine protocol as per CDC guidelines  Discussed over-the-counter medications for symptom relief  She is to contact our office for worsening symptoms  Relevant Orders    COVID Only- Collected at [facility] or [facility]      Other Visit Diagnoses     Exposure to COVID-19 virus        Relevant Orders    COVID Only- Collected at [facility] or [facility]         Disposition:     Referred patient to centralized site to test for COVID-19  Patient is fully vaccinated and I recommended self quarantine for 5 days followed by strict mask use for an additional 5 days  If patient were to develop symptoms, they should immediately self isolate and call our office for further guidance  I have spent 15 minutes directly with the patient  Greater than 50% of this time was spent in counseling/coordination of care regarding: prognosis, risks and benefits of treatment options, instructions for management, patient and family education, importance of treatment compliance, risk factor reductions and impressions  Encounter provider Shantell Deleon MD    Provider located at [address] [place] [address]    Recent Visits  No visits were found meeting these conditions  Showing recent visits within past 7 days and meeting all other requirements  Today's Visits  Date Type Provider Dept   01/05/22 Telemedicine Shantell Deleon MD [facility]   Showing today's visits and meeting all other requirements  Future Appointments  No visits were found meeting these conditions    Showing future appointments within next 150 days and meeting all other requirements Patient agrees to participate in a virtual check in via telephone or video visit instead of presenting to the office to address urgent/immediate medical needs  Patient is aware this is a billable service  After connecting through [facility], the patient was identified by name and date of birth  Christelle Mcnally was informed that this was a telemedicine visit and that the exam was being conducted confidentially over secure lines  My office door was closed  Other methods to assure confidentiality were taken: D[employer]  No one else was in the room  Christelle Mcnally acknowledged consent and understanding of privacy and security of the telemedicine visit  I informed the patient that I have reviewed her record in Epic and presented the opportunity for her to ask any questions regarding the visit today  The patient agreed to participate  Verification of patient location:  Patient is located in the following state in which I hold an active license: [place]    Subjective:   Christelle Mcnally is a 49 y o  female who is concerned about COVID-19  Patient's symptoms include chills, nasal congestion, sore throat and cough  Patient denies fever, fatigue, rhinorrhea, shortness of breath, chest tightness, abdominal pain, nausea, vomiting, diarrhea and headaches       Date of symptom onset: 1/4/2022  Date of exposure: 1/4/2022  COVID-19 vaccination status: Fully vaccinated (primary series)    Exposure:   Contact with a person who is under investigation (PUI) for or who is positive for COVID-19 within the last 14 days?: Yes    Hospitalized recently for fever and/or lower respiratory symptoms?: No      Currently a healthcare worker that is involved in direct patient care?: No      Works in a special setting where the risk of COVID-19 transmission may be high? (this may include long-term care, correctional and retirement facilities; homeless shelters; assisted-living facilities and group homes ): No      Resident in a special setting where the risk of COVID-19 transmission may be high? (this may include long-term care, correctional and nursing home facilities; homeless shelters; assisted-living facilities and group homes ): No      81-year-old female with recent COVID exposure is seen today with acute URI symptoms  She took a home rapid COVID test this morning and was found to be negative  No results found for: Yosef Carmona, SARSCORONAVI, CORONAVIRUSR, [address]  Past Medical History:   Diagnosis Date    Asthma     Disease of thyroid gland     Hypertension     Migraine headache      Past Surgical History:   Procedure Laterality Date    DILATION AND CURETTAGE OF UTERUS      several    ENDOMETRIAL ABLATION      LAPAROSCOPY      Abdominal    TUBAL LIGATION  1997    WISDOM TOOTH EXTRACTION  1985     Current Outpatient Medications   Medication Sig Dispense Refill    amLODIPine (NORVASC) 2 5 mg tablet Take 1 tablet (2 5 mg total) by mouth daily 90 tablet 1    fluticasone (FLONASE) 50 mcg/act nasal spray 2 sprays into each nostril as needed      levothyroxine 100 mcg tablet Take 1 tablet in the AM on an empty stomach, 45 minutes prior to taking food or other medications  90 tablet 1    lisinopril (ZESTRIL) 40 mg tablet Take 1 tablet (40 mg total) by mouth daily 90 tablet 1    tretinoin (RETIN-A) 0 025 % cream Apply 1 application topically as needed         No current facility-administered medications for this visit  Allergies   Allergen Reactions    Nickel     Other      [place]  Dust mites        Review of Systems   Constitutional: Positive for chills  Negative for activity change, appetite change, diaphoresis, fatigue and fever  HENT: Positive for congestion and sore throat  Negative for postnasal drip, rhinorrhea, sinus pressure, sinus pain and sneezing  Eyes: Negative for visual disturbance  Respiratory: Positive for cough  Negative for apnea, choking, chest tightness, shortness of breath and wheezing  Cardiovascular: Negative for chest pain, palpitations and leg swelling  Gastrointestinal: Negative for abdominal distention, abdominal pain, anal bleeding, blood in stool, constipation, diarrhea, nausea and vomiting  Endocrine: Negative for cold intolerance and heat intolerance  Genitourinary: Negative for difficulty urinating, dysuria and hematuria  Musculoskeletal: Negative  Skin: Negative  Neurological: Negative for dizziness, weakness, light-headedness, numbness and headaches  Hematological: Negative for adenopathy  Psychiatric/Behavioral: Negative for agitation, sleep disturbance and suicidal ideas  All other systems reviewed and are negative  Objective:    Vitals:    01/05/22 1420   Temp: (!) 96 8 °F (36 °C)   TempSrc: Oral   Weight: 66 7 kg (147 lb)   Height: 5' 3" (1 6 m)       Physical Exam  Vitals and nursing note reviewed  Constitutional:       General: She is not in acute distress  Appearance: Normal appearance  She is not ill-appearing or toxic-appearing  HENT:      Head: Normocephalic and atraumatic  Mouth/Throat:      Mouth: Mucous membranes are moist       Pharynx: Oropharynx is clear  No oropharyngeal exudate or posterior oropharyngeal erythema  Eyes:      General: No scleral icterus  Extraocular Movements: Extraocular movements intact  Conjunctiva/sclera: Conjunctivae normal    Pulmonary:      Effort: Pulmonary effort is normal  No respiratory distress  Abdominal:      General: Abdomen is flat  Tenderness: There is no abdominal tenderness  Musculoskeletal:         General: No swelling, deformity or signs of injury  Normal range of motion  Cervical back: Normal range of motion  Right lower leg: No edema  Left lower leg: No edema  Skin:     General: Skin is warm and dry  Coloration: Skin is not jaundiced or pale  Findings: No bruising, erythema, lesion or rash  Neurological:      General: No focal deficit present  Mental Status: She is alert and oriented to person, place, and time  Mental status is at baseline  Cranial Nerves: No cranial nerve deficit  Sensory: No sensory deficit  Psychiatric:         Mood and Affect: Mood normal          Behavior: Behavior normal          Thought Content: Thought content normal          Judgment: Judgment normal          VIRTUAL VISIT DISCLAIMER    Johannyman Calvert verbally agrees to participate in [employer]  Pt is aware that [employer] could be limited without vital signs or the ability to perform a full hands-on physical Brandyn Malone understands she or the provider may request at any time to terminate the video visit and request the patient to seek care or treatment in person

## 2022-01-05 NOTE — ASSESSMENT & PLAN NOTE
Given recent exposure and symptomatology, will test for COVID-19 via PCR  Discussed quarantine protocol as per CDC guidelines  Discussed over-the-counter medications for symptom relief  She is to contact our office for worsening symptoms

## 2022-01-06 PROCEDURE — U0003 INFECTIOUS AGENT DETECTION BY NUCLEIC ACID (DNA OR RNA); SEVERE ACUTE RESPIRATORY SYNDROME CORONAVIRUS 2 (SARS-COV-2) (CORONAVIRUS DISEASE [COVID-19]), AMPLIFIED PROBE TECHNIQUE, MAKING USE OF HIGH THROUGHPUT TECHNOLOGIES AS DESCRIBED BY CMS-2020-01-R: HCPCS | Performed by: INTERNAL MEDICINE

## 2022-01-06 PROCEDURE — U0005 INFEC AGEN DETEC AMPLI PROBE: HCPCS | Performed by: INTERNAL MEDICINE

## 2022-01-12 ENCOUNTER — TELEPHONE (OUTPATIENT)
Dept: INTERNAL MEDICINE CLINIC | Facility: OTHER | Age: 57
End: 2022-01-12

## 2022-01-12 NOTE — TELEPHONE ENCOUNTER
Patient calling because she tested positive for covid and needs a work note to return to work on Tuesday (she is scheduled off on Monday)

## 2022-01-12 NOTE — LETTER
[facility] [address] [address]  Dept: [phone]    January 12, 2022    Patient: Brandyn Canada  YOB: 1965    Brandyn Canada was seen and evaluated at our [facility]  She tested positive for COVID-19, she may return to work on 01/18/2022, which is 5 days from the onset of symptoms  Upon return, they must then adhere to strict masking for an additional 5 days      Sincerely,    Jermaine Martino MD

## 2022-04-19 ENCOUNTER — OFFICE VISIT (OUTPATIENT)
Dept: URGENT CARE | Facility: CLINIC | Age: 57
End: 2022-04-19

## 2022-04-19 VITALS
DIASTOLIC BLOOD PRESSURE: 70 MMHG | SYSTOLIC BLOOD PRESSURE: 110 MMHG | RESPIRATION RATE: 16 BRPM | HEART RATE: 74 BPM | OXYGEN SATURATION: 97 % | TEMPERATURE: 98.1 F

## 2022-04-19 DIAGNOSIS — J01.10 ACUTE NON-RECURRENT FRONTAL SINUSITIS: Primary | ICD-10-CM

## 2022-04-19 RX ORDER — FLUTICASONE PROPIONATE 50 MCG
2 SPRAY, SUSPENSION (ML) NASAL DAILY
Qty: 16 ML | Refills: 0 | Status: SHIPPED | OUTPATIENT
Start: 2022-04-19 | End: 2022-05-04 | Stop reason: SDUPTHER

## 2022-04-19 NOTE — PROGRESS NOTES
[facility]        NAME: Leisa Cintron is a 56 y o  female  : 1965    MRN: [id number]  DATE: 2022  TIME: 2:07 PM    Assessment and Plan   Acute non-recurrent frontal sinusitis [J01 10]  1  Acute non-recurrent frontal sinusitis  fluticasone (FLONASE) 50 mcg/act nasal spray         Patient Instructions       Take Coricidin HCP as needed  Use Flonase nasal spray daily  Increase your water intake and use nasal saline rinses  Recommend Mucinex as needed  Follow up with your PCP or return to the clinic if symptoms worsen or persist more than 3-5 days  Proceed to  ER if symptoms worsen  Chief Complaint     Chief Complaint   Patient presents with    Sinusitis         History of Present Illness       Sinus pressure, pressure in ears, headache x 3 days  Pressure is worse on the R side of her forehead and in R ear  Feels fullness/fluid in ear  She also reported dizziness when turning her head or changing positions quickly  No syncope or falls  She tried taking Mucinex- mild improvement  No fever or chills  Review of Systems   Review of Systems   Constitutional: Negative  Negative for chills and fever  HENT: Positive for congestion, ear pain (right- pressure/ fluid), sinus pressure and sinus pain  Negative for ear discharge, hearing loss, postnasal drip, rhinorrhea, sore throat and trouble swallowing  Eyes: Negative  Negative for pain, discharge, itching and visual disturbance  Respiratory: Negative  Negative for cough, shortness of breath and wheezing  Cardiovascular: Negative  Negative for chest pain and palpitations  Musculoskeletal: Negative  Skin: Negative  Allergic/Immunologic: Positive for environmental allergies  Neurological: Positive for dizziness and headaches  Negative for tremors, seizures, syncope, facial asymmetry, weakness, light-headedness and numbness  All other systems reviewed and are negative          Current Medications Current Outpatient Medications:     amLODIPine (NORVASC) 2 5 mg tablet, Take 1 tablet (2 5 mg total) by mouth daily, Disp: 90 tablet, Rfl: 1    fluticasone (FLONASE) 50 mcg/act nasal spray, 2 sprays into each nostril daily, Disp: 16 mL, Rfl: 0    levothyroxine 100 mcg tablet, Take 1 tablet in the AM on an empty stomach, 45 minutes prior to taking food or other medications  , Disp: 90 tablet, Rfl: 1    lisinopril (ZESTRIL) 40 mg tablet, Take 1 tablet (40 mg total) by mouth daily, Disp: 90 tablet, Rfl: 1    tretinoin (RETIN-A) 0 025 % cream, Apply 1 application topically as needed  , Disp: , Rfl:     Current Allergies     Allergies as of 04/19/2022 - Reviewed 04/19/2022   Allergen Reaction Noted    Nickel  09/01/2020    Other  02/01/2018            The following portions of the patient's history were reviewed and updated as appropriate: allergies, current medications, past family history, past medical history, past social history, past surgical history and problem list      Past Medical History:   Diagnosis Date    Asthma     Disease of thyroid gland     Hypertension     Migraine headache        Past Surgical History:   Procedure Laterality Date    DILATION AND CURETTAGE OF UTERUS      several    ENDOMETRIAL ABLATION      LAPAROSCOPY      Abdominal    TUBAL LIGATION  1997    WISDOM TOOTH EXTRACTION  1985       Family History   Problem Relation Age of Onset    Diabetes Mother     Hypertension Mother     Atrial fibrillation Mother     Kidney cancer Mother     Stroke Father     Heart attack Father     Stroke Maternal Grandmother     Diabetes Maternal Grandmother     Stroke Paternal Grandmother     Diabetes Paternal Grandmother          Medications have been verified  Objective   /70 (BP Location: Right arm, Patient Position: Sitting, Cuff Size: Adult)   Pulse 74   Temp 98 1 °F (36 7 °C) (Tympanic)   Resp 16   SpO2 97%   Breastfeeding No   No LMP recorded   Patient is postmenopausal        Physical Exam     Physical Exam  Vitals and nursing note reviewed  Constitutional:       General: She is not in acute distress  Appearance: Normal appearance  She is well-developed  HENT:      Head: Normocephalic and atraumatic  Jaw: There is normal jaw occlusion  Right Ear: Hearing, ear canal and external ear normal  A middle ear effusion is present  Left Ear: Hearing, ear canal and external ear normal  A middle ear effusion is present  Ears:      Comments: Dizziness symptoms reproducible when turning her head  Symptoms resolve when sitting still  Nose: Mucosal edema and congestion present  No rhinorrhea  Right Turbinates: Swollen  Left Turbinates: Swollen  Right Sinus: Frontal sinus tenderness present  No maxillary sinus tenderness  Left Sinus: No maxillary sinus tenderness or frontal sinus tenderness  Mouth/Throat:      Lips: Pink  Mouth: Mucous membranes are moist       Dentition: Normal dentition  Pharynx: Uvula midline  Posterior oropharyngeal erythema present  No oropharyngeal exudate  Tonsils: No tonsillar exudate  1+ on the right  1+ on the left  Eyes:      General: No visual field deficit  Conjunctiva/sclera: Conjunctivae normal       Pupils: Pupils are equal, round, and reactive to light  Cardiovascular:      Rate and Rhythm: Normal rate and regular rhythm  Pulses: Normal pulses  Heart sounds: Normal heart sounds  Pulmonary:      Effort: Pulmonary effort is normal       Breath sounds: Normal breath sounds  Musculoskeletal:         General: Normal range of motion  Cervical back: Normal range of motion and neck supple  Skin:     General: Skin is warm and dry  Capillary Refill: Capillary refill takes less than 2 seconds  Neurological:      General: No focal deficit present  Mental Status: She is alert and oriented to person, place, and time        Cranial Nerves: Cranial nerves are intact  No facial asymmetry  Sensory: Sensation is intact  Motor: Motor function is intact  Coordination: Coordination is intact  Gait: Gait is intact     Psychiatric:         Mood and Affect: Mood normal          Behavior: Behavior normal

## 2022-04-19 NOTE — PATIENT INSTRUCTIONS
Take Coricidin HCP as needed  Use Flonase nasal spray daily  Increase your water intake and use nasal saline rinses  Recommend Mucinex as needed  Follow up with your PCP or return to the clinic if symptoms worsen or persist more than 3-5 days  Sinusitis, Ambulatory Care   GENERAL INFORMATION:   Sinusitis  is inflammation or infection of your sinuses  It is most often caused by a virus  Acute sinusitis may last up to 12 weeks  Chronic sinusitis lasts longer than 12 weeks  Recurrent sinusitis is when you have 3 or more episodes of sinusitis in 1 year  Common symptoms include the following:   · Fever    · Pain, pressure, redness, or swelling around the forehead, cheeks, or eyes    · Thick yellow or green discharge from your nose    · Tenderness when you touch your face over your sinuses    · Dry cough that happens mostly at night or when you lie down    · Headache and face pain that is worse when you lean forward    · Teeth pain or pain when you chew  Seek immediate care for the following symptoms:   · Vision changes such as double vision    · Confusion or trouble thinking clearly    · Headache and stiff neck    · Trouble breathing  Treatment for sinusitis  may include medicines to relieve nasal and sinus congestion or to decrease pain and fever  Ask your healthcare provider which medicines you should take and how much is safe  Manage sinusitis:   · Drink liquids as directed  Ask your healthcare provider how much liquid to drink each day and which liquids are best for you  Liquids will help loosen and drain the mucus in your sinuses  · Breathe in steam   Heat a bowl of water until you see steam  Lean over the bowl and make a tent over your head with a large towel  Breathe deeply for about 20 minutes  Be careful not to get too close to the steam or burn yourself  Do this 3 times a day  You can also breathe deeply when you take a hot shower  · Rinse your sinuses    Use a sinus rinse device to rinse your nasal passages with a saline (salt water) solution  This will help thin the mucus in your nose and rinse away pollen and dirt  It will also help reduce swelling so you can breathe normally  Ask how often to do this  · Use heat on your sinuses  to decrease pain  Apply heat for 15 to 20 minutes every hour for as many days as directed  · Sleep with your head elevated  Place an extra pillow under your head before you go to sleep to help your sinuses drain  · Do not smoke and avoid secondhand smoke  If you smoke, it is never too late to quit  Ask for information about how to stop smoking if you need help  Prevent the spread of germs that cause sinusitis:  Wash your hands often with soap and water  Wash your hands after you use the bathroom, change a child's diaper, or sneeze  Wash your hands before you prepare or eat food  Follow up with your healthcare provider as directed:  Write down your questions so you remember to ask them during your visits  CARE AGREEMENT:   You have the right to help plan your care  Learn about your health condition and how it may be treated  Discuss treatment options with your caregivers to decide what care you want to receive  You always have the right to refuse treatment  The above information is an  only  It is not intended as medical advice for individual conditions or treatments  Talk to your doctor, nurse or pharmacist before following any medical regimen to see if it is safe and effective for you  © 2014 [address] is for End User's use only and may not be sold, redistributed or otherwise used for commercial purposes  All illustrations and images included in CareNotes® are the copyrighted property of [employer]  or Severiano Chi

## 2022-04-19 NOTE — ASSESSMENT & PLAN NOTE
History and exam findings consistent with sinusitis secondary to seasonal allergies and/or viral infection  No severe/prolonged symptoms  Recommend symptomatic care and allergen avoidance  Refill provided for flonase  Reasons to follow up reviewed wit pt  All questions answered

## 2022-04-26 DIAGNOSIS — I10 ESSENTIAL HYPERTENSION: ICD-10-CM

## 2022-04-26 RX ORDER — AMLODIPINE BESYLATE 2.5 MG/1
2.5 TABLET ORAL DAILY
Qty: 90 TABLET | Refills: 1 | Status: SHIPPED | OUTPATIENT
Start: 2022-04-26 | End: 2022-05-04 | Stop reason: SDUPTHER

## 2022-04-28 ENCOUNTER — APPOINTMENT (OUTPATIENT)
Dept: URGENT CARE | Facility: CLINIC | Age: 57
End: 2022-04-28

## 2022-04-28 DIAGNOSIS — I10 ESSENTIAL HYPERTENSION: ICD-10-CM

## 2022-04-28 DIAGNOSIS — E03.9 ACQUIRED HYPOTHYROIDISM: Primary | ICD-10-CM

## 2022-04-28 LAB
ALBUMIN SERPL BCP-MCNC: 4 G/DL (ref 3.5–5)
ALP SERPL-CCNC: 112 U/L (ref 46–116)
ALT SERPL W P-5'-P-CCNC: 37 U/L (ref 12–78)
ANION GAP SERPL CALCULATED.3IONS-SCNC: 7 MMOL/L (ref 4–13)
AST SERPL W P-5'-P-CCNC: 24 U/L (ref 5–45)
BASOPHILS # BLD AUTO: 0.06 THOUSANDS/ΜL (ref 0–0.1)
BASOPHILS NFR BLD AUTO: 1 % (ref 0–1)
BILIRUB SERPL-MCNC: 0.56 MG/DL (ref 0.2–1)
BUN SERPL-MCNC: 15 MG/DL (ref 5–25)
CALCIUM SERPL-MCNC: 9.3 MG/DL (ref 8.3–10.1)
CHLORIDE SERPL-SCNC: 104 MMOL/L (ref 100–108)
CHOLEST SERPL-MCNC: 199 MG/DL
CO2 SERPL-SCNC: 24 MMOL/L (ref 21–32)
CREAT SERPL-MCNC: 1.21 MG/DL (ref 0.6–1.3)
EOSINOPHIL # BLD AUTO: 0.2 THOUSAND/ΜL (ref 0–0.61)
EOSINOPHIL NFR BLD AUTO: 2 % (ref 0–6)
ERYTHROCYTE [DISTWIDTH] IN BLOOD BY AUTOMATED COUNT: 12.3 % (ref 11.6–15.1)
GFR SERPL CREATININE-BSD FRML MDRD: 50 ML/MIN/1.73SQ M
GLUCOSE P FAST SERPL-MCNC: 100 MG/DL (ref 65–99)
HCT VFR BLD AUTO: 45.3 % (ref 34.8–46.1)
HDLC SERPL-MCNC: 69 MG/DL
HGB BLD-MCNC: 15.1 G/DL (ref 11.5–15.4)
IMM GRANULOCYTES # BLD AUTO: 0.03 THOUSAND/UL (ref 0–0.2)
IMM GRANULOCYTES NFR BLD AUTO: 0 % (ref 0–2)
LDLC SERPL CALC-MCNC: 115 MG/DL (ref 0–100)
LYMPHOCYTES # BLD AUTO: 2.14 THOUSANDS/ΜL (ref 0.6–4.47)
LYMPHOCYTES NFR BLD AUTO: 22 % (ref 14–44)
MCH RBC QN AUTO: 31.1 PG (ref 26.8–34.3)
MCHC RBC AUTO-ENTMCNC: 33.3 G/DL (ref 31.4–37.4)
MCV RBC AUTO: 93 FL (ref 82–98)
MONOCYTES # BLD AUTO: 0.75 THOUSAND/ΜL (ref 0.17–1.22)
MONOCYTES NFR BLD AUTO: 8 % (ref 4–12)
NEUTROPHILS # BLD AUTO: 6.39 THOUSANDS/ΜL (ref 1.85–7.62)
NEUTS SEG NFR BLD AUTO: 67 % (ref 43–75)
NONHDLC SERPL-MCNC: 130 MG/DL
NRBC BLD AUTO-RTO: 0 /100 WBCS
PLATELET # BLD AUTO: 427 THOUSANDS/UL (ref 149–390)
PMV BLD AUTO: 9.7 FL (ref 8.9–12.7)
POTASSIUM SERPL-SCNC: 4.4 MMOL/L (ref 3.5–5.3)
PROT SERPL-MCNC: 7.2 G/DL (ref 6.4–8.2)
RBC # BLD AUTO: 4.85 MILLION/UL (ref 3.81–5.12)
SODIUM SERPL-SCNC: 135 MMOL/L (ref 136–145)
TRIGL SERPL-MCNC: 74 MG/DL
TSH SERPL DL<=0.05 MIU/L-ACNC: 2.4 UIU/ML (ref 0.45–4.5)
WBC # BLD AUTO: 9.57 THOUSAND/UL (ref 4.31–10.16)

## 2022-04-28 PROCEDURE — 84443 ASSAY THYROID STIM HORMONE: CPT | Performed by: INTERNAL MEDICINE

## 2022-04-28 PROCEDURE — 80061 LIPID PANEL: CPT | Performed by: INTERNAL MEDICINE

## 2022-04-28 PROCEDURE — 80053 COMPREHEN METABOLIC PANEL: CPT | Performed by: INTERNAL MEDICINE

## 2022-04-28 PROCEDURE — 85025 COMPLETE CBC W/AUTO DIFF WBC: CPT | Performed by: INTERNAL MEDICINE

## 2022-05-04 ENCOUNTER — OFFICE VISIT (OUTPATIENT)
Dept: INTERNAL MEDICINE CLINIC | Facility: OTHER | Age: 57
End: 2022-05-04
Payer: COMMERCIAL

## 2022-05-04 VITALS
DIASTOLIC BLOOD PRESSURE: 74 MMHG | TEMPERATURE: 98.6 F | SYSTOLIC BLOOD PRESSURE: 114 MMHG | WEIGHT: 148.8 LBS | HEIGHT: 63 IN | RESPIRATION RATE: 18 BRPM | BODY MASS INDEX: 26.36 KG/M2 | HEART RATE: 71 BPM | OXYGEN SATURATION: 97 %

## 2022-05-04 DIAGNOSIS — E03.8 HYPOTHYROIDISM DUE TO HASHIMOTO'S THYROIDITIS: ICD-10-CM

## 2022-05-04 DIAGNOSIS — Z78.0 POSTMENOPAUSAL: ICD-10-CM

## 2022-05-04 DIAGNOSIS — J01.10 ACUTE NON-RECURRENT FRONTAL SINUSITIS: ICD-10-CM

## 2022-05-04 DIAGNOSIS — J30.1 NON-SEASONAL ALLERGIC RHINITIS DUE TO POLLEN: Primary | ICD-10-CM

## 2022-05-04 DIAGNOSIS — I10 HTN (HYPERTENSION), BENIGN: ICD-10-CM

## 2022-05-04 DIAGNOSIS — E06.3 HYPOTHYROIDISM DUE TO HASHIMOTO'S THYROIDITIS: ICD-10-CM

## 2022-05-04 DIAGNOSIS — F41.9 ANXIETY: ICD-10-CM

## 2022-05-04 DIAGNOSIS — I10 ESSENTIAL HYPERTENSION: ICD-10-CM

## 2022-05-04 DIAGNOSIS — D47.3 ESSENTIAL (HEMORRHAGIC) THROMBOCYTHEMIA (HCC): ICD-10-CM

## 2022-05-04 PROBLEM — B34.9 VIRAL INFECTION, UNSPECIFIED: Status: RESOLVED | Noted: 2022-01-05 | Resolved: 2022-05-04

## 2022-05-04 PROCEDURE — 3078F DIAST BP <80 MM HG: CPT | Performed by: INTERNAL MEDICINE

## 2022-05-04 PROCEDURE — 99214 OFFICE O/P EST MOD 30 MIN: CPT | Performed by: INTERNAL MEDICINE

## 2022-05-04 PROCEDURE — 4004F PT TOBACCO SCREEN RCVD TLK: CPT | Performed by: INTERNAL MEDICINE

## 2022-05-04 PROCEDURE — 3074F SYST BP LT 130 MM HG: CPT | Performed by: INTERNAL MEDICINE

## 2022-05-04 PROCEDURE — 3725F SCREEN DEPRESSION PERFORMED: CPT | Performed by: INTERNAL MEDICINE

## 2022-05-04 PROCEDURE — 3008F BODY MASS INDEX DOCD: CPT | Performed by: INTERNAL MEDICINE

## 2022-05-04 RX ORDER — FLUTICASONE PROPIONATE 50 MCG
2 SPRAY, SUSPENSION (ML) NASAL DAILY
Qty: 16 ML | Refills: 0 | Status: SHIPPED | OUTPATIENT
Start: 2022-05-04 | End: 2022-07-07 | Stop reason: SDUPTHER

## 2022-05-04 RX ORDER — LISINOPRIL 40 MG/1
40 TABLET ORAL DAILY
Qty: 90 TABLET | Refills: 1 | Status: SHIPPED | OUTPATIENT
Start: 2022-05-04

## 2022-05-04 RX ORDER — AMLODIPINE BESYLATE 2.5 MG/1
2.5 TABLET ORAL DAILY
Qty: 90 TABLET | Refills: 1 | Status: SHIPPED | OUTPATIENT
Start: 2022-05-04

## 2022-05-04 RX ORDER — LEVOTHYROXINE SODIUM 0.1 MG/1
TABLET ORAL
Qty: 90 TABLET | Refills: 1 | Status: SHIPPED | OUTPATIENT
Start: 2022-05-04

## 2022-05-04 NOTE — PROGRESS NOTES
Assessment/Plan:    Hypothyroidism  Controlled, continue levothyroxine 100 mcg daily  Non-seasonal allergic rhinitis due to pollen  Continue Flonase, discussed starting antihistamine PRN  Essential hypertension  Controlled,continue amlodipine 2 5 mg daily and lisinopril 40 mg daily  Anxiety  Stable, controlled  Continue to monitor off SSRI/SNRI therapy at this time  Essential (hemorrhagic) thrombocythemia ([address] )  Asymptomatic  Continue to monitor clinically  Diagnoses and all orders for this visit:    Non-seasonal allergic rhinitis due to pollen    Essential hypertension  -     amLODIPine (NORVASC) 2 5 mg tablet; Take 1 tablet (2 5 mg total) by mouth daily    Acute non-recurrent frontal sinusitis  -     fluticasone (FLONASE) 50 mcg/act nasal spray; 2 sprays into each nostril daily    Hypothyroidism due to Hashimoto's thyroiditis  -     levothyroxine 100 mcg tablet; Take 1 tablet in the AM on an empty stomach, 45 minutes prior to taking food or other medications  HTN (hypertension), benign  -     lisinopril (ZESTRIL) 40 mg tablet; Take 1 tablet (40 mg total) by mouth daily    Essential (hemorrhagic) thrombocythemia (HCC)    Anxiety    Postmenopausal        BMI Counseling: There is no height or weight on file to calculate BMI  The BMI is above normal  Nutrition recommendations include decreasing portion sizes, encouraging healthy choices of fruits and vegetables, decreasing fast food intake, consuming healthier snacks, limiting drinks that contain sugar, moderation in carbohydrate intake, increasing intake of lean protein, reducing intake of saturated and trans fat and reducing intake of cholesterol  Exercise recommendations include moderate physical activity 150 minutes/week and exercising 3-5 times per week  No pharmacotherapy was ordered  Patient referred to PCP  Rationale for BMI follow-up plan is due to patient being overweight or obese       Depression Screening and Follow-up Plan: Clincally patient does not have depression  No treatment is required  Patient advised to follow-up with PCP for further management  Tobacco Cessation Counseling: Tobacco cessation counseling was provided  The patient is sincerely urged to quit consumption of tobacco  She is not ready to quit tobacco  Medication options and side effects of medication discussed  Patient refused medication  Subjective:      Patient ID: Edin Sharp is a 56 y o  female  Chief Complaint   Patient presents with    Follow-up    Hypertension     Needs refills     Thyroid Problem     Needs refills     Sinusitis     one episode of a headache with floaters, blurred vision  Symptoms lasted a few minutes  29-year-old female seen today for follow-up of chronic conditions  Laboratory studies reviewed today, CBC, CMP, lipid panel, TSH are all within normal range  She had an episode of sinusitis, improved with Flonase and over-the-counter Sudafed  Otherwise, she has no active complaints or concerns at this time  She has been compliant with medication regimen  Hypertension  This is a chronic problem  The current episode started more than 1 year ago  The problem is unchanged  The problem is controlled  Pertinent negatives include no chest pain, headaches, palpitations or shortness of breath  Past treatments include calcium channel blockers and ACE inhibitors  The current treatment provides moderate improvement  There are no compliance problems  Identifiable causes of hypertension include a thyroid problem  Thyroid Problem  Presents for follow-up visit  Patient reports no cold intolerance, constipation, diaphoresis, diarrhea, fatigue, heat intolerance or palpitations  The symptoms have been stable  Sinusitis  Pertinent negatives include no chills, congestion, coughing, diaphoresis, headaches, shortness of breath, sinus pressure, sneezing or sore throat         The following portions of the patient's history were reviewed and updated as appropriate: allergies, current medications, past family history, past medical history, past social history, past surgical history and problem list     Review of Systems   Constitutional: Negative for activity change, appetite change, chills, diaphoresis, fatigue and fever  HENT: Negative for congestion, postnasal drip, rhinorrhea, sinus pressure, sinus pain, sneezing and sore throat  Eyes: Negative for visual disturbance  Respiratory: Negative for apnea, cough, choking, chest tightness, shortness of breath and wheezing  Cardiovascular: Negative for chest pain, palpitations and leg swelling  Gastrointestinal: Negative for abdominal distention, abdominal pain, anal bleeding, blood in stool, constipation, diarrhea, nausea and vomiting  Endocrine: Negative for cold intolerance and heat intolerance  Genitourinary: Negative for difficulty urinating, dysuria and hematuria  Musculoskeletal: Negative  Skin: Negative  Neurological: Negative for dizziness, weakness, light-headedness, numbness and headaches  Hematological: Negative for adenopathy  Psychiatric/Behavioral: Negative for agitation, sleep disturbance and suicidal ideas  All other systems reviewed and are negative  Past Medical History:   Diagnosis Date    Asthma     Disease of thyroid gland     Hypertension     Migraine headache          Current Outpatient Medications:     tretinoin (RETIN-A) 0 025 % cream, Apply 1 application topically as needed  , Disp: , Rfl:     amLODIPine (NORVASC) 2 5 mg tablet, Take 1 tablet (2 5 mg total) by mouth daily, Disp: 90 tablet, Rfl: 1    fluticasone (FLONASE) 50 mcg/act nasal spray, 2 sprays into each nostril daily, Disp: 16 mL, Rfl: 0    levothyroxine 100 mcg tablet, Take 1 tablet in the AM on an empty stomach, 45 minutes prior to taking food or other medications  , Disp: 90 tablet, Rfl: 1    lisinopril (ZESTRIL) 40 mg tablet, Take 1 tablet (40 mg total) by mouth daily, Disp: 90 tablet, Rfl: 1    Allergies   Allergen Reactions    Nickel     Other      [place]  Dust mites        Social History   Past Surgical History:   Procedure Laterality Date    DILATION AND CURETTAGE OF UTERUS      several    ENDOMETRIAL ABLATION      LAPAROSCOPY      Abdominal    TUBAL LIGATION  1997    WISDOM TOOTH EXTRACTION  1985     Family History   Problem Relation Age of Onset    Diabetes Mother     Hypertension Mother     Atrial fibrillation Mother     Kidney cancer Mother     Stroke Father     Heart attack Father     Stroke Maternal Grandmother     Diabetes Maternal Grandmother     Stroke Paternal Grandmother     Diabetes Paternal Grandmother        Objective:  /74 (BP Location: Left arm, Patient Position: Standing, Cuff Size: Standard)   Pulse 71   Temp 98 6 °F (37 °C) (Temporal)   Resp 18   Ht 5' 3" (1 6 m)   Wt 67 5 kg (148 lb 12 8 oz)   SpO2 97%   BMI 26 36 kg/m²     Recent Results (from the past 1344 hour(s))   TSH, 3rd generation with Free T4 reflex    Collection Time: 04/28/22  7:51 AM   Result Value Ref Range    TSH 3RD GENERATON 2 400 0 450 - 4 500 uIU/mL   Lipid panel    Collection Time: 04/28/22  7:51 AM   Result Value Ref Range    Cholesterol 199 See Comment mg/dL    Triglycerides 74 See Comment mg/dL    HDL, Direct 69 >=50 mg/dL    LDL Calculated 115 (H) 0 - 100 mg/dL    Non-HDL-Chol (CHOL-HDL) 130 mg/dl   Comprehensive metabolic panel    Collection Time: 04/28/22  7:51 AM   Result Value Ref Range    Sodium 135 (L) 136 - 145 mmol/L    Potassium 4 4 3 5 - 5 3 mmol/L    Chloride 104 100 - 108 mmol/L    CO2 24 21 - 32 mmol/L    ANION GAP 7 4 - 13 mmol/L    BUN 15 5 - 25 mg/dL    Creatinine 1 21 0 60 - 1 30 mg/dL    Glucose, Fasting 100 (H) 65 - 99 mg/dL    Calcium 9 3 8 3 - 10 1 mg/dL    AST 24 5 - 45 U/L    ALT 37 12 - 78 U/L    Alkaline Phosphatase 112 46 - 116 U/L    Total Protein 7 2 6 4 - 8 2 g/dL    Albumin 4 0 3 5 - 5 0 g/dL    Total Bilirubin 0 56 0 20 - 1 00 mg/dL    eGFR 50 ml/min/1 73sq m   CBC and differential    Collection Time: 04/28/22  7:51 AM   Result Value Ref Range    WBC 9 57 4 31 - 10 16 Thousand/uL    RBC 4 85 3 81 - 5 12 Million/uL    Hemoglobin 15 1 11 5 - 15 4 g/dL    Hematocrit 45 3 34 8 - 46 1 %    MCV 93 82 - 98 fL    MCH 31 1 26 8 - 34 3 pg    MCHC 33 3 31 4 - 37 4 g/dL    RDW 12 3 11 6 - 15 1 %    MPV 9 7 8 9 - 12 7 fL    Platelets 894 (H) 328 - 390 Thousands/uL    nRBC 0 /100 WBCs    Neutrophils Relative 67 43 - 75 %    Immat GRANS % 0 0 - 2 %    Lymphocytes Relative 22 14 - 44 %    Monocytes Relative 8 4 - 12 %    Eosinophils Relative 2 0 - 6 %    Basophils Relative 1 0 - 1 %    Neutrophils Absolute 6 39 1 85 - 7 62 Thousands/µL    Immature Grans Absolute 0 03 0 00 - 0 20 Thousand/uL    Lymphocytes Absolute 2 14 0 60 - 4 47 Thousands/µL    Monocytes Absolute 0 75 0 17 - 1 22 Thousand/µL    Eosinophils Absolute 0 20 0 00 - 0 61 Thousand/µL    Basophils Absolute 0 06 0 00 - 0 10 Thousands/µL            Physical Exam  Vitals and nursing note reviewed  Constitutional:       General: She is not in acute distress  Appearance: She is well-developed  She is not diaphoretic  HENT:      Head: Normocephalic and atraumatic  Eyes:      General:         Right eye: No discharge  Left eye: No discharge  Conjunctiva/sclera: Conjunctivae normal       Pupils: Pupils are equal, round, and reactive to light  Neck:      Thyroid: No thyromegaly  Vascular: No JVD  Cardiovascular:      Rate and Rhythm: Normal rate and regular rhythm  Heart sounds: Normal heart sounds  No murmur heard  No friction rub  No gallop  Pulmonary:      Effort: Pulmonary effort is normal  No respiratory distress  Breath sounds: Normal breath sounds  No wheezing or rales  Chest:      Chest wall: No tenderness  Abdominal:      General: There is no distension  Palpations: Abdomen is soft  Tenderness:  There is no abdominal tenderness  Musculoskeletal:         General: No tenderness or deformity  Normal range of motion  Cervical back: Normal range of motion and neck supple  Lymphadenopathy:      Cervical: No cervical adenopathy  Skin:     General: Skin is warm and dry  Coloration: Skin is not pale  Findings: No erythema or rash  Neurological:      Mental Status: She is alert and oriented to person, place, and time  Cranial Nerves: No cranial nerve deficit  Coordination: Coordination normal    Psychiatric:         Behavior: Behavior normal          Thought Content:  Thought content normal          Judgment: Judgment normal

## 2022-07-07 DIAGNOSIS — J01.10 ACUTE NON-RECURRENT FRONTAL SINUSITIS: ICD-10-CM

## 2022-07-08 RX ORDER — FLUTICASONE PROPIONATE 50 MCG
2 SPRAY, SUSPENSION (ML) NASAL DAILY
Qty: 16 ML | Refills: 2 | Status: SHIPPED | OUTPATIENT
Start: 2022-07-08 | End: 2027-10-13

## 2022-08-04 ENCOUNTER — TELEMEDICINE (OUTPATIENT)
Dept: INTERNAL MEDICINE CLINIC | Facility: OTHER | Age: 57
End: 2022-08-04
Payer: COMMERCIAL

## 2022-08-04 VITALS — HEIGHT: 63 IN | TEMPERATURE: 98.5 F | WEIGHT: 143 LBS | BODY MASS INDEX: 25.34 KG/M2

## 2022-08-04 DIAGNOSIS — E03.8 HYPOTHYROIDISM DUE TO HASHIMOTO'S THYROIDITIS: ICD-10-CM

## 2022-08-04 DIAGNOSIS — F17.200 TOBACCO USE DISORDER: ICD-10-CM

## 2022-08-04 DIAGNOSIS — B34.9 VIRAL INFECTION, UNSPECIFIED: Primary | ICD-10-CM

## 2022-08-04 DIAGNOSIS — E06.3 HYPOTHYROIDISM DUE TO HASHIMOTO'S THYROIDITIS: ICD-10-CM

## 2022-08-04 PROCEDURE — 99213 OFFICE O/P EST LOW 20 MIN: CPT

## 2022-08-04 PROCEDURE — U0003 INFECTIOUS AGENT DETECTION BY NUCLEIC ACID (DNA OR RNA); SEVERE ACUTE RESPIRATORY SYNDROME CORONAVIRUS 2 (SARS-COV-2) (CORONAVIRUS DISEASE [COVID-19]), AMPLIFIED PROBE TECHNIQUE, MAKING USE OF HIGH THROUGHPUT TECHNOLOGIES AS DESCRIBED BY CMS-2020-01-R: HCPCS

## 2022-08-04 PROCEDURE — U0005 INFEC AGEN DETEC AMPLI PROBE: HCPCS

## 2022-08-04 RX ORDER — NICOTINE 21 MG/24HR
1 PATCH, TRANSDERMAL 24 HOURS TRANSDERMAL EVERY 24 HOURS
Qty: 28 PATCH | Refills: 0 | Status: SHIPPED | OUTPATIENT
Start: 2022-08-04 | End: 2022-09-01

## 2022-08-04 NOTE — PROGRESS NOTES
(Suspected) COVID-19 Outpatient Progress Note    Assessment/Plan:    #Viral pharyngitis  Symptoms ongoing for 6 days  Will obtain COVID PCR at office to rule-out  Recommended 5 day quarantine while awaiting results; see rest of COVID plan below  May consider component of fatigue from hypothyroidism, but last labs 4/2022 WNL and patient compliant w/meds  · Advised rest, fluids, gargling with warm salt water  · Saline and corticosteroid nasal sprays for congestion relief  · Advised patient to follow up in 1 week if not improved    #Tobacco use  Counseled on smoking cessation  Patient states she is willing to quit and has successfully quit for a few years at a time x2 attempts  She is amenable to trying nicotine patches  Educated on improvement of infection prevention/strengthened immune response with smoking cessation for improved recovery from illnesses such as above  Active 0 5 PPD smoker  - 14 mg nicotine path x 4 weeks  - Followed by 7 mg nicotine patch x 2 weeks  - Reassess quitting success at next visit    #Hypothyroidism  - Controlled per last 4/2022 labs; consider repeat at next visit due to c/o progressive, week-month long fatigue in July 2022  - Continue levothyroxine 100 mcg qD          Problem List Items Addressed This Visit        Endocrine    Hypothyroidism       Other    Tobacco use disorder    Relevant Medications    nicotine (NICODERM CQ) 14 mg/24hr TD 24 hr patch    nicotine (NICODERM CQ) 7 mg/24hr TD 24 hr patch (Start on 9/2/2022)      Other Visit Diagnoses     Viral infection, unspecified    -  Primary    Relevant Medications    sodium chloride (OCEAN) 0 65 % nasal spray    Other Relevant Orders    Covid/Flu- Office Collect         Disposition:     Recommended patient to come to the office to test for COVID-19  Patient is fully vaccinated and I recommended self quarantine for 5 days followed by strict mask use for an additional 5 days   If patient were to develop symptoms, they should immediately self isolate and call our office for further guidance  Discussed symptom directed medication options with patient  I have spent 20 minutes directly with the patient  Greater than 50% of this time was spent in counseling/coordination of care regarding: prognosis, risks and benefits of treatment options, instructions for management, risk factor reductions and impressions  Encounter provider Adina Reid MD    Provider located at [address] [place] [address]    Recent Visits  No visits were found meeting these conditions  Showing recent visits within past 7 days and meeting all other requirements  Today's Visits  Date Type Provider Dept   08/04/22 Telemedicine Adina Reid MD [facility]   Showing today's visits and meeting all other requirements  Future Appointments  No visits were found meeting these conditions  Showing future appointments within next 150 days and meeting all other requirements     This virtual check-in was done via [employer] [address] and patient was informed that this is a secure, HIPAA-compliant platform  She agrees to proceed  Patient agrees to participate in a virtual check in via telephone or video visit instead of presenting to the office to address urgent/immediate medical needs  Patient is aware this is a billable service  After connecting through [facility], the patient was identified by name and date of birth  Stefan Banks was informed that this was a telemedicine visit and that the exam was being conducted confidentially over secure lines  My office door was closed  No one else was in the room  Stefan Banks acknowledged consent and understanding of privacy and security of the telemedicine visit   I informed the patient that I have reviewed her record in Epic and presented the opportunity for her to ask any questions regarding the visit today  The patient agreed to participate  Verification of patient location:  Patient is located in the following state in which I hold an active license: [place]    Subjective:   Rose Godinez is a 56 y o  female who is concerned about COVID-19  Patient's symptoms include fever (101 yesterday PM), chills, fatigue, nasal congestion, sore throat (not hard to swallow, feels reaw), nausea (on/off this past week), vomiting (last week, mostly clear mucus/water) and headache  Patient denies rhinorrhea, anosmia, loss of taste, cough (chronic, nothing new), shortness of breath, chest tightness, abdominal pain, diarrhea (not quite diarrhea but looser than usual) and myalgias  - Date of symptom onset: 7/29/2022      COVID-19 vaccination status: Fully vaccinated (primary series)    Exposure:   Contact with a person who is under investigation (PUI) for or who is positive for COVID-19 within the last 14 days?: No    Hospitalized recently for fever and/or lower respiratory symptoms?: No      Currently a healthcare worker that is involved in direct patient care?: No      Symptoms began 6 days ago on 7/29 when patient first noticed a sore throat  She also developed submandibular tender lymphadenopathy around the same time  She describes the back of her throat as red and painful  Over the weekend, 4-5 days ago, patient developed a rash along bra line, upper back, and upper arms that resolved with 2 days of po benadryl  2-3 days ago, patient developed low grade fevers around 99F, as high as 101F last night  Took home Ag COVID test yesterday evening and resulted negative  She presents with concern of ongoing fatigue and sore throat  Lives at home with 23 yo daughter and   They have been in good health  Patient does not have any known exposure to COVID (+) individuals, although on Sat evening (5d ago) patient hosted family/friends at her home for a small gathering   She has not heard if they are sick or symptomatic  Has chronic allergies with chronic allergic sinusitis  No recent known allergen exposures  Has chronic sensation of sinus pressure and occasional cough, but these have not worsened or changed  No recent travel, attending large events, or camping/hiking  Lab Results   Component Value Date    SARSCOV2 Positive (A) 01/06/2022     Past Medical History:   Diagnosis Date    Asthma     Disease of thyroid gland     Hypertension     Migraine headache     Tobacco use      Past Surgical History:   Procedure Laterality Date    DILATION AND CURETTAGE OF UTERUS      several    ENDOMETRIAL ABLATION      LAPAROSCOPY      Abdominal    TUBAL LIGATION  1997    WISDOM TOOTH EXTRACTION  1985     Current Outpatient Medications   Medication Sig Dispense Refill    amLODIPine (NORVASC) 2 5 mg tablet Take 1 tablet (2 5 mg total) by mouth daily 90 tablet 1    fluticasone (FLONASE) 50 mcg/act nasal spray 2 sprays into each nostril daily 16 mL 2    levothyroxine 100 mcg tablet Take 1 tablet in the AM on an empty stomach, 45 minutes prior to taking food or other medications  90 tablet 1    lisinopril (ZESTRIL) 40 mg tablet Take 1 tablet (40 mg total) by mouth daily 90 tablet 1    nicotine (NICODERM CQ) 14 mg/24hr TD 24 hr patch Place 1 patch on the skin every 24 hours for 28 days 28 patch 0    [START ON 9/2/2022] nicotine (NICODERM CQ) 7 mg/24hr TD 24 hr patch Place 1 patch on the skin every 24 hours for 14 days 14 patch 0    sodium chloride (OCEAN) 0 65 % nasal spray 1 spray into each nostril as needed for congestion for up to 14 days 15 mL 0    tretinoin (RETIN-A) 0 025 % cream Apply 1 application topically as needed         No current facility-administered medications for this visit  Allergies   Allergen Reactions    Nickel     Other      [place]  Dust mites        Review of Systems   Constitutional: Positive for chills, fatigue and fever (101 yesterday PM)     HENT: Positive for congestion and sore throat (not hard to swallow, feels reaw)  Negative for rhinorrhea  Respiratory: Negative for cough (chronic, nothing new), chest tightness and shortness of breath  Gastrointestinal: Positive for nausea (on/off this past week) and vomiting (last week, mostly clear mucus/water)  Negative for abdominal pain and diarrhea (not quite diarrhea but looser than usual)  Musculoskeletal: Negative for myalgias  Skin: Positive for rash (around bra-line anteriorly and posteriorly, and upper arms)  Neurological: Positive for headaches  Objective:    Vitals:    08/04/22 1042   Temp: 98 5 °F (36 9 °C)   TempSrc: Temporal   Weight: 64 9 kg (143 lb)   Height: 5' 3" (1 6 m)       Physical Exam  Vitals reviewed  Constitutional:       General: She is not in acute distress  Appearance: She is ill-appearing  Comments: Standing/sitting in no acute distress  Sounds congested  Appears fatigued  HENT:      Mouth/Throat:      Mouth: Mucous membranes are moist       Pharynx: Posterior oropharyngeal erythema (posterior oropharynx) present  Eyes:      General: No scleral icterus  Right eye: No discharge  Left eye: No discharge  Extraocular Movements: Extraocular movements intact  Pulmonary:      Effort: No respiratory distress  Breath sounds: No stridor  Neurological:      Mental Status: She is alert  Psychiatric:         Mood and Affect: Mood normal          Behavior: Behavior normal       Comments: Cheerful, cooperative         VIRTUAL VISIT DISCLAIMER    Poncho Trevino verbally agrees to participate in [employer]  Pt is aware that [employer] could be limited without vital signs or the ability to perform a full hands-on physical Jah Alicea understands she or the provider may request at any time to terminate the video visit and request the patient to seek care or treatment in person      Mayra Cardenas MD   PGY-2 Internal [address] [facility]

## 2022-08-05 ENCOUNTER — TELEPHONE (OUTPATIENT)
Dept: INTERNAL MEDICINE CLINIC | Facility: OTHER | Age: 57
End: 2022-08-05

## 2022-08-05 DIAGNOSIS — J06.9 ACUTE URI: Primary | ICD-10-CM

## 2022-08-05 LAB — SARS-COV-2 RNA RESP QL NAA+PROBE: NEGATIVE

## 2022-08-05 NOTE — TELEPHONE ENCOUNTER
Patient calling because she had a virtual visit with the resident and her covid results came back negative  She stated that it is more in her head, ears, and sinuses today and is wondering if an antibiotic can be sent in for her since it's not covid and it's the weekend

## 2022-08-08 RX ORDER — AZITHROMYCIN 250 MG/1
TABLET, FILM COATED ORAL
Qty: 6 TABLET | Refills: 0 | Status: SHIPPED | OUTPATIENT
Start: 2022-08-08 | End: 2022-08-12

## 2022-10-19 DIAGNOSIS — I10 ESSENTIAL HYPERTENSION: ICD-10-CM

## 2022-10-19 RX ORDER — AMLODIPINE BESYLATE 2.5 MG/1
2.5 TABLET ORAL DAILY
Qty: 90 TABLET | Refills: 0 | Status: SHIPPED | OUTPATIENT
Start: 2022-10-19

## 2022-11-08 ENCOUNTER — OFFICE VISIT (OUTPATIENT)
Dept: INTERNAL MEDICINE CLINIC | Facility: OTHER | Age: 57
End: 2022-11-08

## 2022-11-08 VITALS
HEIGHT: 63 IN | TEMPERATURE: 97.6 F | DIASTOLIC BLOOD PRESSURE: 74 MMHG | WEIGHT: 149.8 LBS | BODY MASS INDEX: 26.54 KG/M2 | HEART RATE: 76 BPM | OXYGEN SATURATION: 97 % | RESPIRATION RATE: 18 BRPM | SYSTOLIC BLOOD PRESSURE: 118 MMHG

## 2022-11-08 DIAGNOSIS — J30.1 NON-SEASONAL ALLERGIC RHINITIS DUE TO POLLEN: ICD-10-CM

## 2022-11-08 DIAGNOSIS — I10 ESSENTIAL HYPERTENSION: ICD-10-CM

## 2022-11-08 DIAGNOSIS — Z12.31 ENCOUNTER FOR SCREENING MAMMOGRAM FOR MALIGNANT NEOPLASM OF BREAST: ICD-10-CM

## 2022-11-08 DIAGNOSIS — I10 HTN (HYPERTENSION), BENIGN: ICD-10-CM

## 2022-11-08 DIAGNOSIS — F17.200 TOBACCO USE DISORDER: ICD-10-CM

## 2022-11-08 DIAGNOSIS — Z13.820 ENCOUNTER FOR OSTEOPOROSIS SCREENING IN ASYMPTOMATIC POSTMENOPAUSAL PATIENT: ICD-10-CM

## 2022-11-08 DIAGNOSIS — E03.8 HYPOTHYROIDISM DUE TO HASHIMOTO'S THYROIDITIS: Primary | ICD-10-CM

## 2022-11-08 DIAGNOSIS — E06.3 HYPOTHYROIDISM DUE TO HASHIMOTO'S THYROIDITIS: Primary | ICD-10-CM

## 2022-11-08 DIAGNOSIS — R23.3 EASY BRUISING: ICD-10-CM

## 2022-11-08 DIAGNOSIS — F41.9 ANXIETY: ICD-10-CM

## 2022-11-08 DIAGNOSIS — R00.2 PALPITATIONS: ICD-10-CM

## 2022-11-08 DIAGNOSIS — Z78.0 ENCOUNTER FOR OSTEOPOROSIS SCREENING IN ASYMPTOMATIC POSTMENOPAUSAL PATIENT: ICD-10-CM

## 2022-11-08 RX ORDER — LISINOPRIL 40 MG/1
40 TABLET ORAL DAILY
Qty: 90 TABLET | Refills: 1 | Status: SHIPPED | OUTPATIENT
Start: 2022-11-08

## 2022-11-08 RX ORDER — FLUCONAZOLE 150 MG/1
TABLET ORAL
COMMUNITY
Start: 2022-09-21 | End: 2022-11-08

## 2022-11-08 RX ORDER — LEVOTHYROXINE SODIUM 0.1 MG/1
TABLET ORAL
Qty: 90 TABLET | Refills: 1 | Status: SHIPPED | OUTPATIENT
Start: 2022-11-08

## 2022-11-08 NOTE — PROGRESS NOTES
Assessment/Plan:    Hypothyroidism  Will check TSH  Continue levothyroxine 100 mcg daily  Essential hypertension  Well controlled  Continue amlodipine 2 5 mg daily lisinopril 40 mg daily  Non-seasonal allergic rhinitis due to pollen  Discussed starting loratadine and continue Flonase  I suspect her dizziness may be due to allergic rhinitis/sinus congestion  Tobacco use disorder  Counseled patient smoking cessation  Anxiety  Stable  Continue to monitor clinically  Palpitations  She was normal sinus rhythm upon auscultation today  Likely due to PVCs  She is to contact our office if she develops recurrence or increased frequency  Easy bruising  Will check a CBC  Diagnoses and all orders for this visit:    Hypothyroidism due to Hashimoto's thyroiditis  -     levothyroxine 100 mcg tablet; Take 1 tablet in the AM on an empty stomach, 45 minutes prior to taking food or other medications  -     CBC; Future  -     Comprehensive metabolic panel; Future  -     TSH, 3rd generation with Free T4 reflex; Future  -     HEMOGLOBIN A1C W/ EAG ESTIMATION; Future    Encounter for osteoporosis screening in asymptomatic postmenopausal patient  -     DXA bone density spine hip and pelvis; Future    Encounter for screening mammogram for malignant neoplasm of breast    HTN (hypertension), benign  -     lisinopril (ZESTRIL) 40 mg tablet; Take 1 tablet (40 mg total) by mouth daily    Easy bruising  -     CBC; Future  -     Comprehensive metabolic panel; Future    Essential hypertension  -     CBC; Future  -     Comprehensive metabolic panel; Future  -     TSH, 3rd generation with Free T4 reflex; Future  -     HEMOGLOBIN A1C W/ EAG ESTIMATION;  Future    Anxiety    Non-seasonal allergic rhinitis due to pollen    Tobacco use disorder    Palpitations    Other orders  -     Discontinue: fluconazole (DIFLUCAN) 150 mg tablet; TAKE ONE TABLET EVERY 3 DAYS FOR 3 DOSES THEN ONCE WEEKLY FOR 12 WEEKS (Patient not taking: Reported on 11/8/2022)        BMI Counseling: Body mass index is 26 54 kg/m²  The BMI is above normal  Nutrition recommendations include decreasing portion sizes, encouraging healthy choices of fruits and vegetables, decreasing fast food intake, consuming healthier snacks, limiting drinks that contain sugar, moderation in carbohydrate intake, increasing intake of lean protein, reducing intake of saturated and trans fat and reducing intake of cholesterol  Exercise recommendations include moderate physical activity 150 minutes/week and exercising 3-5 times per week  No pharmacotherapy was ordered  Patient referred to PCP  Rationale for BMI follow-up plan is due to patient being overweight or obese  Depression Screening and Follow-up Plan: Patient was screened for depression during today's encounter  They screened negative with a PHQ-2 score of 0  Tobacco Cessation Counseling: Tobacco cessation counseling was provided  The patient is sincerely urged to quit consumption of tobacco  She is not ready to quit tobacco  Medication options and side effects of medication discussed  Patient refused medication  Subjective:      Patient ID: Nancy Rice is a 56 y o  female  Chief Complaint   Patient presents with   • Follow-up     6 month, no labs due  •      Hep c, dexa, cervical screening, annual exam, mammo, phq, BMI f/u plan   • issues     Has a list of issues       56year old female is seen today for follow up of chronic conditions  No labs to review today  She has been monitoring her BP at home, reports normal range BP readings however she has intermittent episodes of palpitations  Heart rate during BP readings range from 85-94 bpm    She has been experiencing head pressure and dizziness that occurs intermittent  She has also been experiencing blurred vision and floaters   She saw her eye doctor last year to which was told she has a normal exam and recommended seeing a neurologist   She has also noticed easy bruising  She denies any bleeding gums, hematuria, or melena/hmatochezia  Thyroid Problem  Presents for follow-up visit  Symptoms include palpitations  Patient reports no cold intolerance, constipation, diaphoresis, diarrhea, fatigue or heat intolerance  The symptoms have been stable  Hypertension  This is a chronic problem  The current episode started more than 1 year ago  The problem is unchanged  The problem is controlled  Associated symptoms include palpitations  Pertinent negatives include no chest pain, headaches or shortness of breath  Past treatments include calcium channel blockers and ACE inhibitors  The current treatment provides moderate improvement  There are no compliance problems  Identifiable causes of hypertension include a thyroid problem  The following portions of the patient's history were reviewed and updated as appropriate: allergies, current medications, past family history, past medical history, past social history, past surgical history and problem list     Review of Systems   Constitutional: Negative for activity change, appetite change, chills, diaphoresis, fatigue and fever  HENT: Negative for congestion, postnasal drip, rhinorrhea, sinus pressure, sinus pain, sneezing and sore throat  Eyes: Negative for visual disturbance  Respiratory: Negative for apnea, cough, choking, chest tightness, shortness of breath and wheezing  Cardiovascular: Positive for palpitations  Negative for chest pain and leg swelling  Gastrointestinal: Negative for abdominal distention, abdominal pain, anal bleeding, blood in stool, constipation, diarrhea, nausea and vomiting  Endocrine: Negative for cold intolerance and heat intolerance  Genitourinary: Negative for difficulty urinating, dysuria and hematuria  Musculoskeletal: Negative  Skin: Negative  Neurological: Negative for dizziness, weakness, light-headedness, numbness and headaches     Hematological: Negative for adenopathy  Psychiatric/Behavioral: Negative for agitation, sleep disturbance and suicidal ideas  All other systems reviewed and are negative  Past Medical History:   Diagnosis Date   • Asthma    • Disease of thyroid gland    • Hypertension    • Migraine headache    • Tobacco use          Current Outpatient Medications:   •  amLODIPine (NORVASC) 2 5 mg tablet, Take 1 tablet (2 5 mg total) by mouth daily, Disp: 90 tablet, Rfl: 0  •  fluticasone (FLONASE) 50 mcg/act nasal spray, 2 sprays into each nostril daily, Disp: 16 mL, Rfl: 2  •  levothyroxine 100 mcg tablet, Take 1 tablet in the AM on an empty stomach, 45 minutes prior to taking food or other medications  , Disp: 90 tablet, Rfl: 1  •  lisinopril (ZESTRIL) 40 mg tablet, Take 1 tablet (40 mg total) by mouth daily, Disp: 90 tablet, Rfl: 1  •  tretinoin (RETIN-A) 0 025 % cream, Apply 1 application topically as needed  , Disp: , Rfl:   •  sodium chloride (OCEAN) 0 65 % nasal spray, 1 spray into each nostril as needed for congestion for up to 14 days, Disp: 15 mL, Rfl: 0    Allergies   Allergen Reactions   • Nickel    • Other      [place]  Dust mites        Social History   Past Surgical History:   Procedure Laterality Date   • DILATION AND CURETTAGE OF UTERUS      several   • ENDOMETRIAL ABLATION     • LAPAROSCOPY      Abdominal   • TUBAL LIGATION  1997   • [facility]     Family History   Problem Relation Age of Onset   • Diabetes Mother    • Hypertension Mother    • Atrial fibrillation Mother    • Kidney cancer Mother    • Stroke Father    • Heart attack Father    • Stroke Maternal Grandmother    • Diabetes Maternal Grandmother    • Stroke Paternal Grandmother    • Diabetes Paternal Grandmother        Objective:  /74 (BP Location: Left arm, Patient Position: Sitting, Cuff Size: Standard)   Pulse 76   Temp 97 6 °F (36 4 °C) (Temporal)   Resp 18   Ht 5' 3" (1 6 m)   Wt 67 9 kg (149 lb 12 8 oz)   SpO2 97%   BMI 26 54 kg/m²     No results found for this or any previous visit (from the past 1344 hour(s))  Physical Exam  Vitals and nursing note reviewed  Constitutional:       General: She is not in acute distress  Appearance: She is well-developed  She is not diaphoretic  HENT:      Head: Normocephalic and atraumatic  Eyes:      General:         Right eye: No discharge  Left eye: No discharge  Conjunctiva/sclera: Conjunctivae normal       Pupils: Pupils are equal, round, and reactive to light  Neck:      Thyroid: No thyromegaly  Vascular: No JVD  Cardiovascular:      Rate and Rhythm: Normal rate and regular rhythm  Heart sounds: Normal heart sounds  No murmur heard  No friction rub  No gallop  Pulmonary:      Effort: Pulmonary effort is normal  No respiratory distress  Breath sounds: Normal breath sounds  No wheezing or rales  Chest:      Chest wall: No tenderness  Abdominal:      General: There is no distension  Palpations: Abdomen is soft  Tenderness: There is no abdominal tenderness  Musculoskeletal:         General: No tenderness or deformity  Normal range of motion  Cervical back: Normal range of motion and neck supple  Lymphadenopathy:      Cervical: No cervical adenopathy  Skin:     General: Skin is warm and dry  Coloration: Skin is not pale  Findings: No erythema or rash  Neurological:      Mental Status: She is alert and oriented to person, place, and time  Cranial Nerves: No cranial nerve deficit  Coordination: Coordination normal    Psychiatric:         Behavior: Behavior normal          Thought Content:  Thought content normal          Judgment: Judgment normal

## 2022-11-08 NOTE — ASSESSMENT & PLAN NOTE
She was normal sinus rhythm upon auscultation today  Likely due to PVCs  She is to contact our office if she develops recurrence or increased frequency

## 2022-11-08 NOTE — ASSESSMENT & PLAN NOTE
Discussed starting loratadine and continue Flonase  I suspect her dizziness may be due to allergic rhinitis/sinus congestion

## 2022-11-09 ENCOUNTER — TELEPHONE (OUTPATIENT)
Dept: ADMINISTRATIVE | Facility: OTHER | Age: 57
End: 2022-11-09

## 2022-11-09 NOTE — TELEPHONE ENCOUNTER
2Upon review of the In Basket request we were able to locate, review, and update the patient chart as requested for Mammogram     Any additional questions or concerns should be emailed to the Practice Liaisons via the appropriate education email address, please do not reply via In Basket  Thank you  Kylah Robledo         11/09/22 8:27 AM     See documentation in the [employer] SmartForm       Kylah Book

## 2022-11-09 NOTE — TELEPHONE ENCOUNTER
----- Message from Adalberto Burroughs MA sent at 11/9/2022  7:43 AM EST -----  Regarding: mammo and pap  11/09/22 7:44 AM    Hello, our patient Gerard Cooper has had Mammogram and pap completed/performed  Please assist in updating the patient chart by pulling the Care Everywhere (CE) document  The date of service is 10/13/22 and 4/14/22       Thank you,  [address]  [facility] [place]

## 2022-11-09 NOTE — TELEPHONE ENCOUNTER
Upon review of the In Basket request we were able to locate, review, and update the patient chart as requested for Mammogram and Pap Smear (HPV) aka Cervical Cancer Screening  Any additional questions or concerns should be emailed to the Practice Liaisons via the appropriate education email address, please do not reply via In Basket  Thank you  Jovanni Escalante         11/09/22 8:30 AM     See documentation in the [employer] SmartForm       Jovanni Escalante

## 2022-11-09 NOTE — TELEPHONE ENCOUNTER
----- Message from [facility] sent at 11/9/2022  7:39 AM EST -----  11/09/22 7:39 AM    Hello, our patient Kary Calero has had Mammogram completed/performed  Please assist in updating the patient chart by pulling the Care Everywhere (CE) document  The date of service is 10/2022       Thank you,  [facility]

## 2022-11-10 ENCOUNTER — APPOINTMENT (OUTPATIENT)
Dept: URGENT CARE | Facility: CLINIC | Age: 57
End: 2022-11-10

## 2022-11-10 DIAGNOSIS — R23.3 EASY BRUISING: ICD-10-CM

## 2022-11-10 DIAGNOSIS — E03.8 HYPOTHYROIDISM DUE TO HASHIMOTO'S THYROIDITIS: Primary | ICD-10-CM

## 2022-11-10 DIAGNOSIS — I10 ESSENTIAL HYPERTENSION: ICD-10-CM

## 2022-11-10 DIAGNOSIS — E06.3 HYPOTHYROIDISM DUE TO HASHIMOTO'S THYROIDITIS: Primary | ICD-10-CM

## 2022-11-10 LAB
ALBUMIN SERPL BCP-MCNC: 4.2 G/DL (ref 3.5–5)
ALP SERPL-CCNC: 120 U/L (ref 46–116)
ALT SERPL W P-5'-P-CCNC: 41 U/L (ref 12–78)
ANION GAP SERPL CALCULATED.3IONS-SCNC: 5 MMOL/L (ref 4–13)
AST SERPL W P-5'-P-CCNC: 19 U/L (ref 5–45)
BASOPHILS # BLD AUTO: 0.06 THOUSANDS/ÂΜL (ref 0–0.1)
BASOPHILS NFR BLD AUTO: 1 % (ref 0–1)
BILIRUB SERPL-MCNC: 0.4 MG/DL (ref 0.2–1)
BUN SERPL-MCNC: 19 MG/DL (ref 5–25)
CALCIUM SERPL-MCNC: 9.9 MG/DL (ref 8.3–10.1)
CHLORIDE SERPL-SCNC: 106 MMOL/L (ref 96–108)
CO2 SERPL-SCNC: 27 MMOL/L (ref 21–32)
CREAT SERPL-MCNC: 1.01 MG/DL (ref 0.6–1.3)
EOSINOPHIL # BLD AUTO: 0.13 THOUSAND/ÂΜL (ref 0–0.61)
EOSINOPHIL NFR BLD AUTO: 2 % (ref 0–6)
ERYTHROCYTE [DISTWIDTH] IN BLOOD BY AUTOMATED COUNT: 12.1 % (ref 11.6–15.1)
EST. AVERAGE GLUCOSE BLD GHB EST-MCNC: 114 MG/DL
GFR SERPL CREATININE-BSD FRML MDRD: 62 ML/MIN/1.73SQ M
GLUCOSE SERPL-MCNC: 84 MG/DL (ref 65–140)
HBA1C MFR BLD: 5.6 %
HCT VFR BLD AUTO: 45.5 % (ref 34.8–46.1)
HGB BLD-MCNC: 15.3 G/DL (ref 11.5–15.4)
IMM GRANULOCYTES # BLD AUTO: 0.03 THOUSAND/UL (ref 0–0.2)
IMM GRANULOCYTES NFR BLD AUTO: 0 % (ref 0–2)
LYMPHOCYTES # BLD AUTO: 3.19 THOUSANDS/ÂΜL (ref 0.6–4.47)
LYMPHOCYTES NFR BLD AUTO: 37 % (ref 14–44)
MCH RBC QN AUTO: 31.4 PG (ref 26.8–34.3)
MCHC RBC AUTO-ENTMCNC: 33.6 G/DL (ref 31.4–37.4)
MCV RBC AUTO: 93 FL (ref 82–98)
MONOCYTES # BLD AUTO: 0.79 THOUSAND/ÂΜL (ref 0.17–1.22)
MONOCYTES NFR BLD AUTO: 9 % (ref 4–12)
NEUTROPHILS # BLD AUTO: 4.44 THOUSANDS/ÂΜL (ref 1.85–7.62)
NEUTS SEG NFR BLD AUTO: 51 % (ref 43–75)
NRBC BLD AUTO-RTO: 0 /100 WBCS
PLATELET # BLD AUTO: 431 THOUSANDS/UL (ref 149–390)
PMV BLD AUTO: 9.8 FL (ref 8.9–12.7)
POTASSIUM SERPL-SCNC: 4.1 MMOL/L (ref 3.5–5.3)
PROT SERPL-MCNC: 7.8 G/DL (ref 6.4–8.4)
RBC # BLD AUTO: 4.88 MILLION/UL (ref 3.81–5.12)
SODIUM SERPL-SCNC: 138 MMOL/L (ref 135–147)
TSH SERPL DL<=0.05 MIU/L-ACNC: 0.55 UIU/ML (ref 0.45–4.5)
WBC # BLD AUTO: 8.64 THOUSAND/UL (ref 4.31–10.16)

## 2023-01-24 ENCOUNTER — OFFICE VISIT (OUTPATIENT)
Dept: URGENT CARE | Facility: CLINIC | Age: 58
End: 2023-01-24

## 2023-01-24 VITALS
HEART RATE: 92 BPM | TEMPERATURE: 97.6 F | RESPIRATION RATE: 16 BRPM | SYSTOLIC BLOOD PRESSURE: 120 MMHG | DIASTOLIC BLOOD PRESSURE: 78 MMHG | OXYGEN SATURATION: 98 %

## 2023-01-24 DIAGNOSIS — B37.31 VAGINAL CANDIDIASIS: Primary | ICD-10-CM

## 2023-01-24 DIAGNOSIS — J01.10 ACUTE NON-RECURRENT FRONTAL SINUSITIS: ICD-10-CM

## 2023-01-24 RX ORDER — AMOXICILLIN AND CLAVULANATE POTASSIUM 875; 125 MG/1; MG/1
1 TABLET, FILM COATED ORAL EVERY 12 HOURS SCHEDULED
Qty: 14 TABLET | Refills: 0 | Status: SHIPPED | OUTPATIENT
Start: 2023-01-24 | End: 2023-01-31

## 2023-01-24 RX ORDER — FLUCONAZOLE 150 MG/1
TABLET ORAL
Qty: 1 TABLET | Refills: 1 | Status: SHIPPED | OUTPATIENT
Start: 2023-01-24 | End: 2023-02-03

## 2023-01-24 RX ORDER — FLUTICASONE PROPIONATE 50 MCG
2 SPRAY, SUSPENSION (ML) NASAL DAILY
Qty: 16 ML | Refills: 2 | Status: SHIPPED | OUTPATIENT
Start: 2023-01-24 | End: 2028-04-30

## 2023-01-24 NOTE — ASSESSMENT & PLAN NOTE
History and exam findings consistent with sinusitis  Rx written for Augmentin and Diflucan to use if symptoms of yeast infection develop  Also recommend symptomatic care and allergen avoidance  Refill provided for flonase  Reasons to follow up reviewed wit pt  All questions answered

## 2023-01-24 NOTE — PROGRESS NOTES
[facility]        NAME: Stacey Upton is a 56 y o  female  : 1965    MRN: [id number]  DATE: 2023  TIME: 11:38 AM    Assessment and Plan   Vaginal candidiasis [B37 31]  1  Vaginal candidiasis  fluconazole (DIFLUCAN) 150 mg tablet      2  Acute non-recurrent frontal sinusitis  amoxicillin-clavulanate (AUGMENTIN) 875-125 mg per tablet    fluticasone (FLONASE) 50 mcg/act nasal spray            Patient Instructions     Take Augmentin as prescribed, take with food  Continue Flonase nasal spray daily  Increase your water intake and use nasal saline rinses  Recommend Mucinex as needed  Take Diflucan if needed for symptoms of a yeast infection  Follow up with your PCP or return to the clinic if symptoms worsen or persist more than 3-5 days  Proceed to  ER if symptoms worsen  Chief Complaint     Chief Complaint   Patient presents with   • Sinusitis   • Ear Fullness         History of Present Illness       Pt c/o feeling fluid in her ears and sinus pressure x 2 weeks  Tender lymph node below R ear  No fever, chills, or cough  No sick contacts  She tried Coricidin HBP- did not help  Symptoms are worse the last 2 days  Pt also requests Rx for diflucan due to hx vaginal candidiasis after antibiotic use  Review of Systems   Review of Systems   Constitutional: Negative  Negative for chills, fatigue and fever  HENT: Positive for congestion, postnasal drip, rhinorrhea, sinus pressure and sinus pain  Negative for ear discharge, ear pain, hearing loss, sore throat and trouble swallowing  Eyes: Negative  Respiratory: Negative  Negative for cough, shortness of breath and wheezing  Cardiovascular: Negative  Negative for chest pain and palpitations  Musculoskeletal: Negative  Skin: Negative  Negative for rash  Neurological: Positive for headaches  All other systems reviewed and are negative          Current Medications       Current Outpatient Medications: •  amLODIPine (NORVASC) 2 5 mg tablet, Take 1 tablet (2 5 mg total) by mouth daily, Disp: 90 tablet, Rfl: 0  •  amoxicillin-clavulanate (AUGMENTIN) 875-125 mg per tablet, Take 1 tablet by mouth every 12 (twelve) hours for 7 days, Disp: 14 tablet, Rfl: 0  •  fluconazole (DIFLUCAN) 150 mg tablet, Take 1 tab PO once if symptoms of a yeast infection develop  May repeat in 72 hours if symptoms persist , Disp: 1 tablet, Rfl: 1  •  fluticasone (FLONASE) 50 mcg/act nasal spray, 2 sprays into each nostril daily, Disp: 16 mL, Rfl: 2  •  levothyroxine 100 mcg tablet, Take 1 tablet in the AM on an empty stomach, 45 minutes prior to taking food or other medications  , Disp: 90 tablet, Rfl: 1  •  lisinopril (ZESTRIL) 40 mg tablet, Take 1 tablet (40 mg total) by mouth daily, Disp: 90 tablet, Rfl: 1  •  tretinoin (RETIN-A) 0 025 % cream, Apply 1 application topically as needed  , Disp: , Rfl:   •  sodium chloride (OCEAN) 0 65 % nasal spray, 1 spray into each nostril as needed for congestion for up to 14 days, Disp: 15 mL, Rfl: 0    Current Allergies     Allergies as of 01/24/2023 - Reviewed 01/24/2023   Allergen Reaction Noted   • Nickel  09/01/2020   • Other  02/01/2018            The following portions of the patient's history were reviewed and updated as appropriate: allergies, current medications, past family history, past medical history, past social history, past surgical history and problem list      Past Medical History:   Diagnosis Date   • Asthma    • Disease of thyroid gland    • Hypertension    • Migraine headache    • Tobacco use        Past Surgical History:   Procedure Laterality Date   • DILATION AND CURETTAGE OF UTERUS      several   • ENDOMETRIAL ABLATION     • LAPAROSCOPY      Abdominal   • TUBAL LIGATION  1997   • WISDOM TOOTH EXTRACTION  1985       Family History   Problem Relation Age of Onset   • Diabetes Mother    • Hypertension Mother    • Atrial fibrillation Mother    • Kidney cancer Mother    • Stroke Father    • Heart attack Father    • Stroke Maternal Grandmother    • Diabetes Maternal Grandmother    • Stroke Paternal Grandmother    • Diabetes Paternal Grandmother          Medications have been verified  Objective   /78 (BP Location: Right arm, Patient Position: Sitting, Cuff Size: Adult)   Pulse 92   Temp 97 6 °F (36 4 °C) (Tympanic)   Resp 16   SpO2 98%   No LMP recorded  Patient is postmenopausal        Physical Exam     Physical Exam  Vitals and nursing note reviewed  Constitutional:       General: She is not in acute distress  Appearance: Normal appearance  She is well-developed  HENT:      Head: Normocephalic and atraumatic  Jaw: There is normal jaw occlusion  Right Ear: Hearing, ear canal and external ear normal  A middle ear effusion is present  Left Ear: Hearing, ear canal and external ear normal  A middle ear effusion is present  Nose: Mucosal edema, congestion and rhinorrhea present  Rhinorrhea is purulent  Right Turbinates: Swollen  Left Turbinates: Swollen  Right Sinus: Frontal sinus tenderness present  Left Sinus: Frontal sinus tenderness present  Mouth/Throat:      Lips: Pink  Mouth: Mucous membranes are moist       Dentition: Normal dentition  Pharynx: Uvula midline  Posterior oropharyngeal erythema present  No oropharyngeal exudate  Tonsils: No tonsillar exudate  1+ on the right  1+ on the left  Eyes:      Conjunctiva/sclera: Conjunctivae normal       Pupils: Pupils are equal, round, and reactive to light  Cardiovascular:      Rate and Rhythm: Normal rate and regular rhythm  Pulses: Normal pulses  Heart sounds: Normal heart sounds  Pulmonary:      Effort: Pulmonary effort is normal       Breath sounds: Normal breath sounds  Musculoskeletal:         General: Normal range of motion  Cervical back: Normal range of motion and neck supple  Skin:     General: Skin is warm and dry  Capillary Refill: Capillary refill takes less than 2 seconds  Neurological:      General: No focal deficit present  Mental Status: She is alert and oriented to person, place, and time     Psychiatric:         Mood and Affect: Mood normal          Behavior: Behavior normal

## 2023-01-24 NOTE — PATIENT INSTRUCTIONS
Take Augmentin as prescribed, take with food  Continue Flonase nasal spray daily  Increase your water intake and use nasal saline rinses  Recommend Mucinex as needed  Take Diflucan if needed for symptoms of a yeast infection  Follow up with your PCP or return to the clinic if symptoms worsen or persist more than 3-5 days

## 2023-01-26 DIAGNOSIS — I10 ESSENTIAL HYPERTENSION: ICD-10-CM

## 2023-01-26 RX ORDER — AMLODIPINE BESYLATE 2.5 MG/1
2.5 TABLET ORAL DAILY
Qty: 90 TABLET | Refills: 1 | Status: SHIPPED | OUTPATIENT
Start: 2023-01-26

## 2023-03-17 ENCOUNTER — TELEPHONE (OUTPATIENT)
Dept: NEUROLOGY | Facility: CLINIC | Age: 58
End: 2023-03-17

## 2023-03-17 NOTE — TELEPHONE ENCOUNTER
Spoke to patient - patient confirmed appt with jaydon on 03/20/23 [facility] neuro office

## 2023-03-20 ENCOUNTER — OFFICE VISIT (OUTPATIENT)
Dept: NEUROLOGY | Facility: CLINIC | Age: 58
End: 2023-03-20
Payer: COMMERCIAL

## 2023-03-20 VITALS
SYSTOLIC BLOOD PRESSURE: 122 MMHG | BODY MASS INDEX: 25.52 KG/M2 | HEIGHT: 63 IN | WEIGHT: 144 LBS | DIASTOLIC BLOOD PRESSURE: 80 MMHG | HEART RATE: 80 BPM

## 2023-03-20 DIAGNOSIS — R42 VERTIGO: ICD-10-CM

## 2023-03-20 DIAGNOSIS — G43.109 MIGRAINE WITH AURA AND WITHOUT STATUS MIGRAINOSUS, NOT INTRACTABLE: Primary | ICD-10-CM

## 2023-03-20 DIAGNOSIS — R53.83 FATIGUE: ICD-10-CM

## 2023-03-20 PROCEDURE — 99204 OFFICE O/P NEW MOD 45 MIN: CPT | Performed by: STUDENT IN AN ORGANIZED HEALTH CARE EDUCATION/TRAINING PROGRAM

## 2023-03-20 RX ORDER — AMITRIPTYLINE HYDROCHLORIDE 10 MG/1
TABLET, FILM COATED ORAL
Qty: 270 TABLET | Refills: 3 | Status: SHIPPED | OUTPATIENT
Start: 2023-03-20 | End: 2023-09-14

## 2023-03-20 RX ORDER — RIZATRIPTAN BENZOATE 10 MG/1
10 TABLET ORAL AS NEEDED
Qty: 9 TABLET | Refills: 3 | Status: SHIPPED | OUTPATIENT
Start: 2023-03-20

## 2023-03-20 NOTE — PATIENT INSTRUCTIONS
Patient instructions      Additional Testing:   Neurodiagnostic workup: MRI Brain ordered    Headache/migraine treatment:   Abortive medications (for immediate treatment of a headache):   It is ok to take ibuprofen, acetaminophen or naproxen (Advil, Tylenol,  Aleve, Excedrin) if they help your headaches you should limit these to No more than 3 times a week to avoid medication overuse/rebound headaches.     For your more moderate to severe migraines take this medication early   Maxalt (rizatriptan) 10mg tabs - take one at the onset of headache. May repeat one time after 1-2 hours if pain has not resolved.   (Max 2 a day and 9 a month)     - some people may have some side effects from this medication, most typically do not.  Common side effects include making you feel tired, palpitations, tingling or tightness of the face or chest.  Most people report the side effects are nothing compared to their migraines and do not mind these. If you have intolerable side effects we will stop.      Over the counter preventive supplements for headaches/migraines (if you try, try for 3 months straight)  (to take every day to help prevent headaches - not to take at the time of headache):  There are combo pills online of these - none of which regulated by FDA and double check dosing - take appropriate dose only once a day- preventa migraine, migravent, mind ease, migrelief   [x] Magnesium 400mg daily (If any diarrhea or upset stomach, decrease dose  as tolerated)  [x] Riboflavin (Vitamin B2) 400mg daily - try online   (FYI B2 may make your urine bright/neon yellow)  AND/OR  [] Herbal medication: Petasites/Butterbur 150 mg daily - try online  (When choosing your Butterbur online or in the store, beware that there are some poor preps containing pyrrolizidine alkaloids (PAs) that can be harmful to the liver. Therefore, do not use butterbur products that are not labeled as PA-free.)    Sleep and headache prevention:   [] Melatonin - you  may take 3 mg nightly for sleep. You should take this 1 hour prior to bedtime consistently every night for it to work. It works by gradually helping to adjust your sleep time over days to weeks, rather than immediately making you feel sleepy.      Prescription preventive medications for headaches/migraines   (to take every day to help prevent headaches - not to take at the time of headache):  [x] Amytriptyline start 10mg at bedtime. Increase by 10mg each week until good effect on headaches/pain or reach 50mg nightly    *Typically these types of medications take time untill you see the benefit, although some may see improvement in days, often it may take weeks, especially if the medication is being titrated up to a beneficial level. Please contact us if there are any concerns or questions regarding the medication.     Lifestyle Recommendations:  [x] SLEEP - Maintain a regular sleep schedule: Adults need at least 7-8 hours of uninterrupted a night. Maintain good sleep hygiene:  Going to bed and waking up at consistent times, avoiding excessive daytime naps, avoiding caffeinated beverages in the evening, avoid excessive stimulation in the evening and generally using bed primarily for sleeping.  One hour before bedtime would recommend turning lights down lower, decreasing your activity (may read quietly, listen to music at a low volume). When you get into bed, should eliminate all technology (no texting, emailing, playing with your phone, iPad or tablet in bed).  [x] HYDRATION - Maintain good hydration.  Drink  2L of fluid a day (4 typical small water bottles)  [x] DIET - Maintain good nutrition. In particular don't skip meals and try and eat healthy balanced meals regularly.  [x] TRIGGERS - Look for other triggers and avoid them: Limit caffeine to 1-2 cups a day or less. Avoid dietary triggers that you have noticed bring on your headaches (this could include aged cheese, peanuts, MSG, aspartame and nitrates).  [x]  EXERCISE - physical exercise as we all know is good for you in many ways, and not only is good for your heart, but also is beneficial for your mental health, cognitive health and  chronic pain/headaches. I would encourage at the least 5 days of physical exercise weekly for at least 30 minutes.     Education and Follow-up  [x] Please call with any questions or concerns. Of course if any new concerning symptoms go to the emergency department.  [x] Follow up in 4-5 months

## 2023-03-20 NOTE — PROGRESS NOTES
[facility]  PATIENT:  Dee Cristina  MRN:  [id number]  :  1965  DATE OF SERVICE:  3/20/2023  REFERRED BY: Self, Referral  PMD: Saleem Kitchen MD    Assessment/Plan:     Dee Cristina is a very pleasant  58 y.o. female with a past medical history that includes HTN, hypothyroidism who presents for f/u of headaches.      Migraines with visual/vestibular aura   Workup   -MRI brain IAC  Preventative:  - we discussed headache hygiene and lifestyle factors that may improve headaches, including sleep hygiene, proper hydration, limiting caffeine intake  - start amitriptyline 10 mg qhs     Abortive:  - discussed not taking over-the-counter or prescription pain medications more than 3 days per week to prevent medication overuse/rebound headache  - maxalt 10 mg PRN at the onset of her migraines    CC:       History of Present Illness:     58 y.o. female with a past medical history that includes HTN, hypothyroidism who presents for f/u of headaches.     Had sinus trouble for years. Pain above the eyes, tightness in the sides of her head. Often feels like she has water in her ears.     Mother had occipital stroke recently and she became more concerned    Has dizziness associated with the pressure. Dizziness always associated with the head pressures.     ent - saw several years ago    Feels subjective weakness in her arms at times. Feels like this is occurring when she is having the head pressure. Not associated with exertion.     Sees floaters in her vision associated with her headaches  Makes her feel tired/weak. Sometimes fireworks in vision    Headaches started at what age? 39  How often do the headaches occur?   - as of 3/20/2023: 6 out of 8 days  What time of the day do the headaches start?  No particular time of day   How long do the headaches last?   Are you ever headache free? Yes    Aura? with aura     Where is your headache located and pain quality? Periorbital  What is the intensity of pain?    Associated symptoms:   [] Nausea       [] Vomiting        [] Diarrhea  [] Stiff or sore neck   [] Problems with concentration  [x] Photophobia     []Phonophobia      [] Osmophobia  [] Blurred vision   [] Prefer quiet, dark room  [x] Light-headed or dizzy      [] Tinnitus   [x] Hands or feet tingle or feel numb/paresthesias      [] Ptosis      [] Facial droop  [] Lacrimation  [x] Nasal congestion/rhinorrhea        Things that make the headache worse? No specific movements    Headache triggers: none identifiable     Have you seen someone else for headaches or pain? No  Have you had trigger point injection performed and how often? No  Have you had Botox injection performed and how often? No   Have you had epidural injections or transforaminal injections performed? No  Have you ever had any Brain imaging? Yes    What medications do you take or have you taken for your headaches?   ABORTIVE:    OTC medications have been ineffective   advil - not every day    PREVENTIVE:   topamax - brain fog, depression    LIFESTYLE  Sleep   - averages: 6-7 hrs  Problems falling asleep?:   sometimes  Problems staying asleep?:  No    Water: 2-5 bottles daily  Caffeine: 3-4 half caf cups in the morning    Mood:   Endorses anxiety, denies depression    The following portions of the patient's history were reviewed and updated as appropriate: allergies, current medications, past family history, past medical history, past social history, past surgical history and problem list.      Past Medical History:     Past Medical History:   Diagnosis Date    Asthma     Disease of thyroid gland     Hypertension     Migraine headache     Tobacco use        Patient Active Problem List   Diagnosis    Endometriosis    Hypothyroidism    Postmenopausal    Tobacco use disorder    Essential hypertension    Palpitations    Non-seasonal allergic rhinitis due to pollen    Anxiety    Annual physical exam    Acute non-recurrent frontal sinusitis    Easy bruising        Medications:      Current Outpatient Medications   Medication Sig Dispense Refill    amLODIPine (NORVASC) 2.5 mg tablet Take 1 tablet (2.5 mg total) by mouth daily 90 tablet 1    fluticasone (FLONASE) 50 mcg/act nasal spray 2 sprays into each nostril daily 16 mL 2    levothyroxine 100 mcg tablet Take 1 tablet in the AM on an empty stomach, 45 minutes prior to taking food or other medications. 90 tablet 1    lisinopril (ZESTRIL) 40 mg tablet Take 1 tablet (40 mg total) by mouth daily 90 tablet 1    tretinoin (RETIN-A) 0.025 % cream Apply 1 application topically as needed        sodium chloride (OCEAN) 0.65 % nasal spray 1 spray into each nostril as needed for congestion for up to 14 days 15 mL 0     No current facility-administered medications for this visit.        Allergies:      Allergies   Allergen Reactions    Nickel     Other      [place]  Dust mites        Family History:     Family History   Problem Relation Age of Onset    Diabetes Mother     Hypertension Mother     Atrial fibrillation Mother     Kidney cancer Mother     Stroke Father     Heart attack Father     Stroke Maternal Grandmother     Diabetes Maternal Grandmother     Stroke Paternal Grandmother     Diabetes Paternal Grandmother        Social History:       Social History     Socioeconomic History    Marital status: /Civil Union     Spouse name: Not on file    Number of children: Not on file    Years of education: Not on file    Highest education level: Not on file   Occupational History    Not on file   Tobacco Use    Smoking status: Every Day     Packs/day: 0.50     Types: Cigarettes    Smokeless tobacco: Never    Tobacco comments:     about 10 cigarettes a day   Vaping Use    Vaping Use: Never used   Substance and Sexual Activity    Alcohol use: Yes     Comment: socially    Drug use: No    Sexual activity: Yes   Other Topics Concern    Not on file   Social History Narrative    Not on file     Social Determinants of Health  "    Financial Resource Strain: Not on file   Food Insecurity: Not on file   Transportation Needs: Not on file   Physical Activity: Not on file   Stress: Not on file   Social Connections: Not on file   Intimate Partner Violence: Not on file   Housing Stability: Not on file         Objective:   /80 (BP Location: Left arm, Patient Position: Sitting, Cuff Size: Large)   Pulse 80   Ht 5' 3\" (1.6 m)   Wt 65.3 kg (144 lb)   BMI 25.51 kg/m²     General: Patient is not in any acute/apparent distress, well nourished, well developed and cooperative.   HEENT: normocephalic, atraumatic, moist membranes  Neck: supple  Extremities: no edema noted   Skin: no lesions or rash  Musculosketal: no bony abnormalities    Neurologic Examination:   Mental status: alert, awake, oriented X 3 and following commands.     Speech/Language: Speech is fluent without any dysarthria, no aphasia noted, omprehension intact    Cranial Nerves:   CN I: smell not tested  CN II: Visual fields full to confrontation  CN III, IV, VI: Extraocular movements intact bilaterally. Pupils equal round and reactive to light bilaterally.  CN V: Facial sensation is normal.  CN VII: Full and symmetric facial movement.  CN VIII: Hearing is normal.  CN IX, X: Palate elevates symmetrically.   CN XI: Shoulder shrug strength is normal.  CN XII: Tongue midline without atrophy or fasciculations.    Motor:   Strength 5/5 in all 4 extremities. Bulk/tone - normal.  Fasiculations - none    Sensory:   Sensation intact to soft touch, vibration and pinprick in all 4 extremities.    Cerebellar:   Finger-to-nose intact, normal heel to shin.    Reflexes: 2+ in all 4 extremities  Pathologic reflexes - babinski reflex negative    Gait:   Normal gait,  Romberg sign negative       Review of Systems:     Review of Systems   Constitutional: Negative.  Negative for appetite change and fever.   HENT: Negative.  Negative for hearing loss, tinnitus, trouble swallowing and voice change.  "   Eyes: Positive for photophobia, pain and visual disturbance (Sometimes).   Respiratory: Positive for shortness of breath (Sometimes).    Cardiovascular: Positive for palpitations (Sometimes).   Gastrointestinal: Negative.  Negative for nausea and vomiting.   Endocrine: Negative.  Negative for cold intolerance.   Genitourinary: Positive for frequency (Sometimes). Negative for dysuria and urgency.   Musculoskeletal: Negative.  Negative for gait problem, myalgias and neck pain.   Skin: Negative.  Negative for rash.   Allergic/Immunologic: Negative.    Neurological: Positive for dizziness, weakness (Arms and legs sometimes), light-headedness, numbness (Sometimes) and headaches (Has a headache today it's a 3-4). Negative for tremors, seizures, syncope, facial asymmetry and speech difficulty.   Hematological: Bruises/bleeds easily (Bruises easily).   Psychiatric/Behavioral: Negative.  Negative for confusion, hallucinations and sleep disturbance.     I have spent a total time of 51 minutes on 3/20/23 in caring for this patient including Risks and benefits of tx options, Instructions for management, Patient and family education, Risk factor reductions, Impressions, Documenting in the medical record, Reviewing / ordering tests, medicine, procedures  , and Obtaining or reviewing history  .

## 2023-03-21 ENCOUNTER — APPOINTMENT (OUTPATIENT)
Dept: URGENT CARE | Facility: CLINIC | Age: 58
End: 2023-03-21

## 2023-03-21 DIAGNOSIS — R53.83 OTHER FATIGUE: Primary | ICD-10-CM

## 2023-03-21 LAB
FOLATE SERPL-MCNC: >20 NG/ML (ref 3.1–17.5)
T4 FREE SERPL-MCNC: 1.36 NG/DL (ref 0.76–1.46)
TSH SERPL DL<=0.05 MIU/L-ACNC: 0.17 UIU/ML (ref 0.45–4.5)
VIT B12 SERPL-MCNC: 640 PG/ML (ref 100–900)

## 2023-03-24 ENCOUNTER — TELEPHONE (OUTPATIENT)
Dept: INTERNAL MEDICINE CLINIC | Facility: OTHER | Age: 58
End: 2023-03-24

## 2023-03-24 DIAGNOSIS — E03.8 HYPOTHYROIDISM DUE TO HASHIMOTO'S THYROIDITIS: ICD-10-CM

## 2023-03-24 DIAGNOSIS — E06.3 HYPOTHYROIDISM DUE TO HASHIMOTO'S THYROIDITIS: ICD-10-CM

## 2023-03-24 RX ORDER — LEVOTHYROXINE SODIUM 88 UG/1
TABLET ORAL
Qty: 90 TABLET | Refills: 1 | Status: SHIPPED | OUTPATIENT
Start: 2023-03-24

## 2023-03-24 NOTE — TELEPHONE ENCOUNTER
Pt called asking for bw results that neuro ordered  She called them and they said she should contact her PCP's office since their doctor reviewed and found nothing of concern  Pt is worried about abnormal levels  I was going to put a telephone note for Dr Kaiser Hernandez to address on Monday but pt stated that Neuro office was calling her  She will get back to me if she still needs to have Dr Kaiser Hernandez review her labs

## 2023-03-24 NOTE — TELEPHONE ENCOUNTER
Can a provider please address since Dr Akua Aceves is not in the office today? Please call the pt to review abnormal thyroid & folate labs and advise if dose of levothyroxine needs to be adjusted

## 2023-03-24 NOTE — TELEPHONE ENCOUNTER
Please let pt know folate is okay  Her TSH is low, I sent a new script for levothyroxine in for 88 mcg daily

## 2023-03-25 PROBLEM — J01.10 ACUTE NON-RECURRENT FRONTAL SINUSITIS: Status: RESOLVED | Noted: 2022-04-19 | Resolved: 2023-03-25

## 2023-03-27 ENCOUNTER — TELEPHONE (OUTPATIENT)
Dept: INTERNAL MEDICINE CLINIC | Facility: OTHER | Age: 58
End: 2023-03-27

## 2023-03-27 DIAGNOSIS — I10 ESSENTIAL HYPERTENSION: ICD-10-CM

## 2023-03-27 DIAGNOSIS — E03.8 HYPOTHYROIDISM DUE TO HASHIMOTO'S THYROIDITIS: Primary | ICD-10-CM

## 2023-03-27 DIAGNOSIS — E06.3 HYPOTHYROIDISM DUE TO HASHIMOTO'S THYROIDITIS: Primary | ICD-10-CM

## 2023-03-27 NOTE — TELEPHONE ENCOUNTER
Spoke with patient regarding recent blood work to which her dose of levothyroxine was decreased from 100 mcg to 88 micrograms daily due to a low TSH and upper limit of normal free T4  She has been experiencing symptoms of hyperthyroidism consisting of feeling anxious and jittery  We will recheck her levels prior to next appointment

## 2023-03-28 NOTE — TELEPHONE ENCOUNTER
No, a low TSH means her thyroid is overactive  This is the correct adjustment   If she has any other concerns regarding the dosage change please refer her to speak with her PCP

## 2023-05-03 ENCOUNTER — TELEPHONE (OUTPATIENT)
Dept: NEUROLOGY | Facility: CLINIC | Age: 58
End: 2023-05-03

## 2023-05-03 DIAGNOSIS — G43.709 CHRONIC MIGRAINE WITHOUT AURA: Primary | ICD-10-CM

## 2023-05-03 NOTE — TELEPHONE ENCOUNTER
[facility]iology called patient needs updated lab orders for BUN and Creatine  Patient needs blood work before MRI scheduled on 5/8/23  Patient wants to get it done on Friday

## 2023-05-03 NOTE — TELEPHONE ENCOUNTER
Called patient, made her aware she needs labs completed  Patient is frustrated she did not know this sooner  Patient will try to get this done by the weekend, cannot guarantee that the labs will be done fasting but will work to get this done soon

## 2023-05-04 DIAGNOSIS — G43.709 CHRONIC MIGRAINE WITHOUT AURA WITHOUT STATUS MIGRAINOSUS, NOT INTRACTABLE: Primary | ICD-10-CM

## 2023-05-05 ENCOUNTER — APPOINTMENT (OUTPATIENT)
Dept: LAB | Facility: HOSPITAL | Age: 58
End: 2023-05-05

## 2023-05-05 DIAGNOSIS — G43.709 CHRONIC MIGRAINE WITHOUT AURA WITHOUT STATUS MIGRAINOSUS, NOT INTRACTABLE: ICD-10-CM

## 2023-05-05 DIAGNOSIS — G43.709 CHRONIC MIGRAINE WITHOUT AURA: ICD-10-CM

## 2023-05-05 LAB
ANION GAP SERPL CALCULATED.3IONS-SCNC: 6 MMOL/L (ref 4–13)
BUN SERPL-MCNC: 20 MG/DL (ref 5–25)
CALCIUM SERPL-MCNC: 9.4 MG/DL (ref 8.4–10.2)
CHLORIDE SERPL-SCNC: 103 MMOL/L (ref 96–108)
CO2 SERPL-SCNC: 25 MMOL/L (ref 21–32)
CREAT SERPL-MCNC: 0.93 MG/DL (ref 0.6–1.3)
GFR SERPL CREATININE-BSD FRML MDRD: 68 ML/MIN/1.73SQ M
GLUCOSE SERPL-MCNC: 93 MG/DL (ref 65–140)
POTASSIUM SERPL-SCNC: 4.1 MMOL/L (ref 3.5–5.3)
SODIUM SERPL-SCNC: 134 MMOL/L (ref 135–147)

## 2023-05-08 ENCOUNTER — HOSPITAL ENCOUNTER (OUTPATIENT)
Dept: MRI IMAGING | Facility: HOSPITAL | Age: 58
Discharge: HOME | End: 2023-05-08
Attending: STUDENT IN AN ORGANIZED HEALTH CARE EDUCATION/TRAINING PROGRAM

## 2023-05-08 DIAGNOSIS — R42 VERTIGO: ICD-10-CM

## 2023-05-08 DIAGNOSIS — G43.109 MIGRAINE WITH AURA AND WITHOUT STATUS MIGRAINOSUS, NOT INTRACTABLE: ICD-10-CM

## 2023-05-08 RX ADMIN — GADOBUTROL 6 ML: 604.72 INJECTION INTRAVENOUS at 08:36

## 2023-05-15 ENCOUNTER — APPOINTMENT (OUTPATIENT)
Dept: URGENT CARE | Facility: CLINIC | Age: 58
End: 2023-05-15

## 2023-05-15 DIAGNOSIS — E03.8 HYPOTHYROIDISM DUE TO HASHIMOTO'S THYROIDITIS: ICD-10-CM

## 2023-05-15 DIAGNOSIS — I10 ESSENTIAL HYPERTENSION: Primary | ICD-10-CM

## 2023-05-15 DIAGNOSIS — E06.3 HYPOTHYROIDISM DUE TO HASHIMOTO'S THYROIDITIS: ICD-10-CM

## 2023-05-15 LAB
ALBUMIN SERPL BCP-MCNC: 4 G/DL (ref 3.5–5)
ALP SERPL-CCNC: 112 U/L (ref 46–116)
ALT SERPL W P-5'-P-CCNC: 31 U/L (ref 12–78)
ANION GAP SERPL CALCULATED.3IONS-SCNC: 3 MMOL/L (ref 4–13)
AST SERPL W P-5'-P-CCNC: 20 U/L (ref 5–45)
BASOPHILS # BLD AUTO: 0.05 THOUSANDS/ÂΜL (ref 0–0.1)
BASOPHILS NFR BLD AUTO: 0 % (ref 0–1)
BILIRUB SERPL-MCNC: 0.45 MG/DL (ref 0.2–1)
BUN SERPL-MCNC: 15 MG/DL (ref 5–25)
CALCIUM SERPL-MCNC: 9.4 MG/DL (ref 8.3–10.1)
CHLORIDE SERPL-SCNC: 106 MMOL/L (ref 96–108)
CHOLEST SERPL-MCNC: 206 MG/DL
CO2 SERPL-SCNC: 24 MMOL/L (ref 21–32)
CREAT SERPL-MCNC: 1.22 MG/DL (ref 0.6–1.3)
EOSINOPHIL # BLD AUTO: 0.12 THOUSAND/ÂΜL (ref 0–0.61)
EOSINOPHIL NFR BLD AUTO: 1 % (ref 0–6)
ERYTHROCYTE [DISTWIDTH] IN BLOOD BY AUTOMATED COUNT: 12.7 % (ref 11.6–15.1)
GFR SERPL CREATININE-BSD FRML MDRD: 49 ML/MIN/1.73SQ M
GLUCOSE P FAST SERPL-MCNC: 101 MG/DL (ref 65–99)
HCT VFR BLD AUTO: 45.1 % (ref 34.8–46.1)
HDLC SERPL-MCNC: 71 MG/DL
HGB BLD-MCNC: 15.1 G/DL (ref 11.5–15.4)
IMM GRANULOCYTES # BLD AUTO: 0.04 THOUSAND/UL (ref 0–0.2)
IMM GRANULOCYTES NFR BLD AUTO: 0 % (ref 0–2)
LDLC SERPL CALC-MCNC: 126 MG/DL (ref 0–100)
LYMPHOCYTES # BLD AUTO: 1.83 THOUSANDS/ÂΜL (ref 0.6–4.47)
LYMPHOCYTES NFR BLD AUTO: 15 % (ref 14–44)
MCH RBC QN AUTO: 31.1 PG (ref 26.8–34.3)
MCHC RBC AUTO-ENTMCNC: 33.5 G/DL (ref 31.4–37.4)
MCV RBC AUTO: 93 FL (ref 82–98)
MONOCYTES # BLD AUTO: 0.77 THOUSAND/ÂΜL (ref 0.17–1.22)
MONOCYTES NFR BLD AUTO: 7 % (ref 4–12)
NEUTROPHILS # BLD AUTO: 9.09 THOUSANDS/ÂΜL (ref 1.85–7.62)
NEUTS SEG NFR BLD AUTO: 77 % (ref 43–75)
NONHDLC SERPL-MCNC: 135 MG/DL
NRBC BLD AUTO-RTO: 0 /100 WBCS
PLATELET # BLD AUTO: 414 THOUSANDS/UL (ref 149–390)
PMV BLD AUTO: 9.9 FL (ref 8.9–12.7)
POTASSIUM SERPL-SCNC: 4.6 MMOL/L (ref 3.5–5.3)
PROT SERPL-MCNC: 7.4 G/DL (ref 6.4–8.4)
RBC # BLD AUTO: 4.86 MILLION/UL (ref 3.81–5.12)
SODIUM SERPL-SCNC: 133 MMOL/L (ref 135–147)
TRIGL SERPL-MCNC: 47 MG/DL
TSH SERPL DL<=0.05 MIU/L-ACNC: 1.28 UIU/ML (ref 0.45–4.5)
WBC # BLD AUTO: 11.9 THOUSAND/UL (ref 4.31–10.16)

## 2023-05-17 ENCOUNTER — OFFICE VISIT (OUTPATIENT)
Dept: INTERNAL MEDICINE CLINIC | Facility: OTHER | Age: 58
End: 2023-05-17

## 2023-05-17 VITALS
BODY MASS INDEX: 25.87 KG/M2 | HEART RATE: 83 BPM | SYSTOLIC BLOOD PRESSURE: 124 MMHG | OXYGEN SATURATION: 98 % | HEIGHT: 63 IN | WEIGHT: 146 LBS | DIASTOLIC BLOOD PRESSURE: 80 MMHG | TEMPERATURE: 98.6 F

## 2023-05-17 DIAGNOSIS — E87.1 HYPONATREMIA: ICD-10-CM

## 2023-05-17 DIAGNOSIS — I10 ESSENTIAL HYPERTENSION: ICD-10-CM

## 2023-05-17 DIAGNOSIS — E06.3 HYPOTHYROIDISM DUE TO HASHIMOTO'S THYROIDITIS: Primary | ICD-10-CM

## 2023-05-17 DIAGNOSIS — I10 HTN (HYPERTENSION), BENIGN: ICD-10-CM

## 2023-05-17 DIAGNOSIS — E03.8 HYPOTHYROIDISM DUE TO HASHIMOTO'S THYROIDITIS: Primary | ICD-10-CM

## 2023-05-17 DIAGNOSIS — G43.009 MIGRAINE WITHOUT AURA AND WITHOUT STATUS MIGRAINOSUS, NOT INTRACTABLE: ICD-10-CM

## 2023-05-17 DIAGNOSIS — D72.829 LEUKOCYTOSIS, UNSPECIFIED TYPE: ICD-10-CM

## 2023-05-17 DIAGNOSIS — F17.200 TOBACCO USE DISORDER: ICD-10-CM

## 2023-05-17 DIAGNOSIS — E78.49 OTHER HYPERLIPIDEMIA: ICD-10-CM

## 2023-05-17 RX ORDER — LISINOPRIL 40 MG/1
40 TABLET ORAL DAILY
Qty: 90 TABLET | Refills: 1 | Status: SHIPPED | OUTPATIENT
Start: 2023-05-17

## 2023-05-17 NOTE — ASSESSMENT & PLAN NOTE
ASCVD 5 4%  Recommend healthy lifestyle choices for your cholesterol  Low fat/low cholesterol diet  Limit/avoid red meat  Eat more lean meat - chicken breast, ground turkey, fish  Exercise 30 mins at least 5 times a week as tolerated

## 2023-05-17 NOTE — PROGRESS NOTES
Assessment/Plan:    Hypothyroidism  Will check TSH  Continue levothyroxine 88 mcg daily  Essential hypertension  Well controlled  Continue amlodipine 2 5 mg daily lisinopril 40 mg daily  Migraine without aura and without status migrainosus, not intractable  Currently following neurology  Tobacco use disorder  Encouraged smoking cessation    Hyponatremia  Continue to trend, repeat labs in one month     Other hyperlipidemia  ASCVD 5 4%  Recommend healthy lifestyle choices for your cholesterol  Low fat/low cholesterol diet  Limit/avoid red meat  Eat more lean meat - chicken breast, ground turkey, fish  Exercise 30 mins at least 5 times a week as tolerated  Leukocytosis  Continue to trend  No signs and symptoms of infection               Diagnoses and all orders for this visit:    Hypothyroidism due to Hashimoto's thyroiditis  -     TSH, 3rd generation with Free T4 reflex; Future    HTN (hypertension), benign  -     lisinopril (ZESTRIL) 40 mg tablet; Take 1 tablet (40 mg total) by mouth daily  -     Comprehensive metabolic panel; Future    Leukocytosis, unspecified type  -     CBC and differential; Future    Hyponatremia  -     Comprehensive metabolic panel; Future    Essential hypertension  -     CBC and differential; Future  -     Comprehensive metabolic panel; Future  -     Lipid panel; Future    Migraine without aura and without status migrainosus, not intractable    Tobacco use disorder    Other hyperlipidemia          Subjective:      Patient ID: Michelle Okeefe is a 66 y o  female  Patient presents today for a 6 month follow up  Noted to have hyponatremia       Hypothyroidism- TSH 1 280, complaint with levothyroxine    Essential hypertension- well controlled on current medications, denies side effect of medication, denies CP and SOB    Hyperlipidemia- ASCVD 5 4%, , currently not on statin    Migraines- currently follows neurology, has not started amitriptyline or Maxalt, recently had MRI                  The following portions of the patient's history were reviewed and updated as appropriate: allergies, current medications, past family history, past medical history, past social history, past surgical history and problem list     Review of Systems   Constitutional: Negative for activity change, appetite change, chills, diaphoresis and fever  HENT: Negative for congestion, ear discharge, ear pain, postnasal drip, rhinorrhea, sinus pressure, sinus pain and sore throat  Eyes: Negative for pain, discharge, itching and visual disturbance  Respiratory: Negative for cough, chest tightness, shortness of breath and wheezing  Cardiovascular: Negative for chest pain, palpitations and leg swelling  Gastrointestinal: Negative for abdominal pain, constipation, diarrhea, nausea and vomiting  Endocrine: Negative for polydipsia, polyphagia and polyuria  Genitourinary: Negative for difficulty urinating, dysuria and urgency  Musculoskeletal: Negative for arthralgias, back pain and neck pain  Skin: Negative for rash and wound  Neurological: Positive for headaches  Negative for dizziness, weakness and numbness  Past Medical History:   Diagnosis Date   • Asthma    • Disease of thyroid gland    • Hypertension    • Migraine headache    • Tobacco use          Current Outpatient Medications:   •  amitriptyline (ELAVIL) 10 mg tablet, Take 1 tablet at bedtime  Can increase by 1 tablet every week as needed for migraine relief up to a maximum of 50 mg at night, Disp: 270 tablet, Rfl: 3  •  amLODIPine (NORVASC) 2 5 mg tablet, Take 1 tablet (2 5 mg total) by mouth daily, Disp: 90 tablet, Rfl: 1  •  fluticasone (FLONASE) 50 mcg/act nasal spray, 2 sprays into each nostril daily, Disp: 16 mL, Rfl: 2  •  levothyroxine 88 mcg tablet, Take 1 tablet in the AM on an empty stomach, 45 minutes prior to taking food or other medications  , Disp: 90 tablet, Rfl: 1  •  lisinopril (ZESTRIL) 40 mg "tablet, Take 1 tablet (40 mg total) by mouth daily, Disp: 90 tablet, Rfl: 1  •  rizatriptan (Maxalt) 10 mg tablet, Take 1 tablet (10 mg total) by mouth as needed for migraine Take at the onset of migraine; if symptoms continue or return, may take another dose at least 2 hours after first dose   Take no more than 2 doses in a day , Disp: 9 tablet, Rfl: 3  •  tretinoin (RETIN-A) 0 025 % cream, Apply 1 application topically as needed  , Disp: , Rfl:     Allergies   Allergen Reactions   • Nickel    • Other      [place]  Dust mites        Social History   Past Surgical History:   Procedure Laterality Date   • DILATION AND CURETTAGE OF UTERUS      several   • ENDOMETRIAL ABLATION     • LAPAROSCOPY      Abdominal   • TUBAL LIGATION  1997   • WISDOM TOOTH EXTRACTION  1985     Family History   Problem Relation Age of Onset   • Diabetes Mother    • Hypertension Mother    • Atrial fibrillation Mother    • Kidney cancer Mother    • Stroke Father    • Heart attack Father    • Stroke Maternal Grandmother    • Diabetes Maternal Grandmother    • Stroke Paternal Grandmother    • Diabetes Paternal Grandmother        Objective:  /80 (BP Location: Left arm, Patient Position: Sitting, Cuff Size: Adult)   Pulse 83   Temp 98 6 °F (37 °C) (Temporal)   Ht 5' 3\" (1 6 m)   Wt 66 2 kg (146 lb)   SpO2 98%   BMI 25 86 kg/m²     Recent Results (from the past 1344 hour(s))   Basic metabolic panel    Collection Time: 05/05/23  1:02 PM   Result Value Ref Range    Sodium 134 (L) 135 - 147 mmol/L    Potassium 4 1 3 5 - 5 3 mmol/L    Chloride 103 96 - 108 mmol/L    CO2 25 21 - 32 mmol/L    ANION GAP 6 4 - 13 mmol/L    BUN 20 5 - 25 mg/dL    Creatinine 0 93 0 60 - 1 30 mg/dL    Glucose 93 65 - 140 mg/dL    Calcium 9 4 8 4 - 10 2 mg/dL    eGFR 68 ml/min/1 73sq m   Comprehensive metabolic panel    Collection Time: 05/15/23  8:29 AM   Result Value Ref Range    Sodium 133 (L) 135 - 147 mmol/L    Potassium 4 6 3 5 - 5 3 mmol/L    Chloride 106 96 - " 108 mmol/L    CO2 24 21 - 32 mmol/L    ANION GAP 3 (L) 4 - 13 mmol/L    BUN 15 5 - 25 mg/dL    Creatinine 1 22 0 60 - 1 30 mg/dL    Glucose, Fasting 101 (H) 65 - 99 mg/dL    Calcium 9 4 8 3 - 10 1 mg/dL    AST 20 5 - 45 U/L    ALT 31 12 - 78 U/L    Alkaline Phosphatase 112 46 - 116 U/L    Total Protein 7 4 6 4 - 8 4 g/dL    Albumin 4 0 3 5 - 5 0 g/dL    Total Bilirubin 0 45 0 20 - 1 00 mg/dL    eGFR 49 ml/min/1 73sq m   CBC and differential    Collection Time: 05/15/23  8:29 AM   Result Value Ref Range    WBC 11 90 (H) 4 31 - 10 16 Thousand/uL    RBC 4 86 3 81 - 5 12 Million/uL    Hemoglobin 15 1 11 5 - 15 4 g/dL    Hematocrit 45 1 34 8 - 46 1 %    MCV 93 82 - 98 fL    MCH 31 1 26 8 - 34 3 pg    MCHC 33 5 31 4 - 37 4 g/dL    RDW 12 7 11 6 - 15 1 %    MPV 9 9 8 9 - 12 7 fL    Platelets 400 (H) 558 - 390 Thousands/uL    nRBC 0 /100 WBCs    Neutrophils Relative 77 (H) 43 - 75 %    Immat GRANS % 0 0 - 2 %    Lymphocytes Relative 15 14 - 44 %    Monocytes Relative 7 4 - 12 %    Eosinophils Relative 1 0 - 6 %    Basophils Relative 0 0 - 1 %    Neutrophils Absolute 9 09 (H) 1 85 - 7 62 Thousands/µL    Immature Grans Absolute 0 04 0 00 - 0 20 Thousand/uL    Lymphocytes Absolute 1 83 0 60 - 4 47 Thousands/µL    Monocytes Absolute 0 77 0 17 - 1 22 Thousand/µL    Eosinophils Absolute 0 12 0 00 - 0 61 Thousand/µL    Basophils Absolute 0 05 0 00 - 0 10 Thousands/µL   Lipid panel    Collection Time: 05/15/23  8:29 AM   Result Value Ref Range    Cholesterol 206 (H) See Comment mg/dL    Triglycerides 47 See Comment mg/dL    HDL, Direct 71 >=50 mg/dL    LDL Calculated 126 (H) 0 - 100 mg/dL    Non-HDL-Chol (CHOL-HDL) 135 mg/dl   TSH, 3rd generation with Free T4 reflex    Collection Time: 05/15/23  8:29 AM   Result Value Ref Range    TSH 3RD GENERATON 1 280 0 450 - 4 500 uIU/mL            Physical Exam  Constitutional:       General: She is not in acute distress  Appearance: She is well-developed  She is not diaphoretic     HENT: Head: Normocephalic and atraumatic  Right Ear: External ear normal       Left Ear: External ear normal       Nose: Nose normal       Mouth/Throat:      Pharynx: No oropharyngeal exudate  Eyes:      General:         Right eye: No discharge  Left eye: No discharge  Conjunctiva/sclera: Conjunctivae normal       Pupils: Pupils are equal, round, and reactive to light  Neck:      Thyroid: No thyromegaly  Cardiovascular:      Rate and Rhythm: Normal rate and regular rhythm  Heart sounds: Normal heart sounds  No murmur heard  No friction rub  No gallop  Pulmonary:      Effort: Pulmonary effort is normal  No respiratory distress  Breath sounds: Normal breath sounds  No stridor  No wheezing or rales  Abdominal:      General: Bowel sounds are normal  There is no distension  Palpations: Abdomen is soft  Tenderness: There is no abdominal tenderness  Musculoskeletal:      Cervical back: Normal range of motion and neck supple  Lymphadenopathy:      Cervical: No cervical adenopathy  Skin:     General: Skin is warm and dry  Findings: No erythema or rash  Neurological:      Mental Status: She is alert and oriented to person, place, and time  Psychiatric:         Behavior: Behavior normal          Thought Content:  Thought content normal          Judgment: Judgment normal

## 2023-06-29 ENCOUNTER — APPOINTMENT (OUTPATIENT)
Dept: LAB | Facility: HOSPITAL | Age: 58
End: 2023-06-29
Payer: COMMERCIAL

## 2023-06-29 DIAGNOSIS — I10 HTN (HYPERTENSION), BENIGN: ICD-10-CM

## 2023-06-29 DIAGNOSIS — E87.1 HYPONATREMIA: ICD-10-CM

## 2023-06-29 DIAGNOSIS — D72.829 LEUKOCYTOSIS, UNSPECIFIED TYPE: ICD-10-CM

## 2023-06-29 LAB
BASOPHILS # BLD AUTO: 0.07 THOUSANDS/ÂΜL (ref 0–0.1)
BASOPHILS NFR BLD AUTO: 1 % (ref 0–1)
EOSINOPHIL # BLD AUTO: 0.11 THOUSAND/ÂΜL (ref 0–0.61)
EOSINOPHIL NFR BLD AUTO: 1 % (ref 0–6)
ERYTHROCYTE [DISTWIDTH] IN BLOOD BY AUTOMATED COUNT: 12.1 % (ref 11.6–15.1)
HCT VFR BLD AUTO: 41.7 % (ref 34.8–46.1)
HGB BLD-MCNC: 14.6 G/DL (ref 11.5–15.4)
IMM GRANULOCYTES # BLD AUTO: 0.04 THOUSAND/UL (ref 0–0.2)
IMM GRANULOCYTES NFR BLD AUTO: 0 % (ref 0–2)
LYMPHOCYTES # BLD AUTO: 3.18 THOUSANDS/ÂΜL (ref 0.6–4.47)
LYMPHOCYTES NFR BLD AUTO: 31 % (ref 14–44)
MCH RBC QN AUTO: 31.5 PG (ref 26.8–34.3)
MCHC RBC AUTO-ENTMCNC: 35 G/DL (ref 31.4–37.4)
MCV RBC AUTO: 90 FL (ref 82–98)
MONOCYTES # BLD AUTO: 0.69 THOUSAND/ÂΜL (ref 0.17–1.22)
MONOCYTES NFR BLD AUTO: 7 % (ref 4–12)
NEUTROPHILS # BLD AUTO: 6.04 THOUSANDS/ÂΜL (ref 1.85–7.62)
NEUTS SEG NFR BLD AUTO: 60 % (ref 43–75)
NRBC BLD AUTO-RTO: 0 /100 WBCS
PLATELET # BLD AUTO: 378 THOUSANDS/UL (ref 149–390)
PMV BLD AUTO: 9.2 FL (ref 8.9–12.7)
RBC # BLD AUTO: 4.64 MILLION/UL (ref 3.81–5.12)
WBC # BLD AUTO: 10.13 THOUSAND/UL (ref 4.31–10.16)

## 2023-06-29 PROCEDURE — 85025 COMPLETE CBC W/AUTO DIFF WBC: CPT

## 2023-06-29 PROCEDURE — 36415 COLL VENOUS BLD VENIPUNCTURE: CPT

## 2023-07-25 DIAGNOSIS — I10 ESSENTIAL HYPERTENSION: ICD-10-CM

## 2023-07-25 RX ORDER — AMLODIPINE BESYLATE 2.5 MG/1
2.5 TABLET ORAL DAILY
Qty: 90 TABLET | Refills: 1 | Status: SHIPPED | OUTPATIENT
Start: 2023-07-25

## 2023-07-27 DIAGNOSIS — E06.3 HYPOTHYROIDISM DUE TO HASHIMOTO'S THYROIDITIS: ICD-10-CM

## 2023-07-27 DIAGNOSIS — E03.8 HYPOTHYROIDISM DUE TO HASHIMOTO'S THYROIDITIS: ICD-10-CM

## 2023-07-27 RX ORDER — LEVOTHYROXINE SODIUM 88 UG/1
TABLET ORAL
Qty: 90 TABLET | Refills: 1 | OUTPATIENT
Start: 2023-07-27

## 2023-08-07 DIAGNOSIS — J01.10 ACUTE NON-RECURRENT FRONTAL SINUSITIS: ICD-10-CM

## 2023-08-07 RX ORDER — FLUTICASONE PROPIONATE 50 MCG
2 SPRAY, SUSPENSION (ML) NASAL DAILY
Qty: 16 ML | Refills: 2 | Status: SHIPPED | OUTPATIENT
Start: 2023-08-07 | End: 2028-11-11

## 2023-09-14 ENCOUNTER — TELEMEDICINE (OUTPATIENT)
Dept: NEUROLOGY | Facility: CLINIC | Age: 58
End: 2023-09-14
Payer: COMMERCIAL

## 2023-09-14 VITALS — HEIGHT: 63 IN | BODY MASS INDEX: 23.57 KG/M2 | WEIGHT: 133 LBS

## 2023-09-14 DIAGNOSIS — G43.109 MIGRAINE WITH AURA AND WITHOUT STATUS MIGRAINOSUS, NOT INTRACTABLE: Primary | ICD-10-CM

## 2023-09-14 PROCEDURE — 99213 OFFICE O/P EST LOW 20 MIN: CPT | Performed by: STUDENT IN AN ORGANIZED HEALTH CARE EDUCATION/TRAINING PROGRAM

## 2023-09-14 RX ORDER — AMITRIPTYLINE HYDROCHLORIDE 10 MG/1
20 TABLET, FILM COATED ORAL
Qty: 270 TABLET | Refills: 3 | Status: SHIPPED | OUTPATIENT
Start: 2023-09-14

## 2023-09-14 NOTE — PROGRESS NOTES
Virtual Regular Visit    Verification of patient location:    Patient is located at Home in the following state in which I hold an active license [place]      Assessment/Plan:  66 y.o. female with a past medical history that includes HTN, hypothyroidism who presents for f/u of headaches.      Migraines with visual/vestibular aura   Preventative:  - we discussed headache hygiene and lifestyle factors that may improve headaches, including sleep hygiene, proper hydration, limiting caffeine intake  - increase amitriptyline up to 20 mg at bedtime. Can increase up to 50 mg at bedtime by 10 mg per week    Abortive:  - discussed not taking over-the-counter or prescription pain medications more than 3 days per week to prevent medication overuse/rebound headache  - maxalt 10 mg PRN at the onset of her migraines    Encounter provider William Mejia MD    Provider located at [address] [place] [address]. [place] [id number]      Recent Visits  No visits were found meeting these conditions. Showing recent visits within past 7 days and meeting all other requirements  Future Appointments  No visits were found meeting these conditions. Showing future appointments within next 150 days and meeting all other requirements       The patient was identified by name and date of birth. Concepcion Robb was informed that this is a telemedicine visit and that the visit is being conducted through the [employer]. She agrees to proceed. .  My office door was closed. No one else was in the room. She acknowledged consent and understanding of privacy and security of the video platform. The patient has agreed to participate and understands they can discontinue the visit at any time. Patient is aware this is a billable service.      Subjective  Concepcion Robb is a 66 y.o. female     Interval Hx  At her last visit, presented with somewhat vague symptoms of head pressure at times accompanied by dizziness and photophobia previously concerning for possible migraines. At her last visit she was started on amitriptyline. She states that since then she has noticed an overall mild improvement in her symptoms since starting the medication, but she again has difficulty quantifying her total migraine days. She states that her current symptoms are better described as sinus pressure. She has not used maxalt at all since her last visit. She has been drinking half-caffeinated coffee. Previous Hx  Had sinus trouble for years. Pain above the eyes, tightness in the sides of her head. Often feels like she has water in her ears.      Mother had occipital stroke recently and she became more concerned     Has dizziness associated with the pressure. Dizziness always associated with the head pressures.      ent - saw several years ago     Feels subjective weakness in her arms at times. Feels like this is occurring when she is having the head pressure. Not associated with exertion.      Sees floaters in her vision associated with her headaches  Makes her feel tired/weak. Sometimes fireworks in vision     Headaches started at what age? 39  How often do the headaches occur?   - as of 3/20/2023: 6 out of 8 days  What time of the day do the headaches start? No particular time of day   How long do the headaches last?   Are you ever headache free? Yes     Aura? with aura      Where is your headache located and pain quality? Periorbital  What is the intensity of pain? Associated symptoms:   []? Nausea       []? Vomiting        []? Diarrhea  []? Stiff or sore neck   []? Problems with concentration  [x]? Photophobia     []? Phonophobia      []? Osmophobia  []? Blurred vision   []? Prefer quiet, dark room  [x]? Light-headed or dizzy      []? Tinnitus   [x]? Hands or feet tingle or feel numb/paresthesias       []? Ptosis      []? Facial droop  []? Lacrimation  [x]?  Nasal congestion/rhinorrhea          Things that make the headache worse? No specific movements     Headache triggers: none identifiable      Have you seen someone else for headaches or pain? No  Have you had trigger point injection performed and how often? No  Have you had Botox injection performed and how often? No   Have you had epidural injections or transforaminal injections performed? No  Have you ever had any Brain imaging? Yes     What medications do you take or have you taken for your headaches? ABORTIVE:    OTC medications have been ineffective   advil - not every day     PREVENTIVE:   topamax - brain fog, depression     LIFESTYLE  Sleep   - averages: 6-7 hrs  Problems falling asleep?:   sometimes  Problems staying asleep?:  No     Water: 2-5 bottles daily  Caffeine: 3-4 half caf cups in the morning     Mood:   Endorses anxiety, denies depression       Past Medical History:   Diagnosis Date   • Asthma    • Disease of thyroid gland    • Hypertension    • Migraine headache    • Tobacco use        Past Surgical History:   Procedure Laterality Date   • DILATION AND CURETTAGE OF UTERUS      several   • ENDOMETRIAL ABLATION     • LAPAROSCOPY      Abdominal   • TUBAL LIGATION  1997   • WISDOM TOOTH EXTRACTION  1985       Current Outpatient Medications   Medication Sig Dispense Refill   • amLODIPine (NORVASC) 2.5 mg tablet Take 1 tablet (2.5 mg total) by mouth daily 90 tablet 1   • fluticasone (FLONASE) 50 mcg/act nasal spray 2 sprays into each nostril daily 16 mL 2   • amitriptyline (ELAVIL) 10 mg tablet Take 1 tablet at bedtime. Can increase by 1 tablet every week as needed for migraine relief up to a maximum of 50 mg at night 270 tablet 3   • levothyroxine 88 mcg tablet Take 1 tablet in the AM on an empty stomach, 45 minutes prior to taking food or other medications.  90 tablet 1   • lisinopril (ZESTRIL) 40 mg tablet Take 1 tablet (40 mg total) by mouth daily 90 tablet 1   • rizatriptan (Maxalt) 10 mg tablet Take 1 tablet (10 mg total) by mouth as needed for migraine Take at the onset of migraine; if symptoms continue or return, may take another dose at least 2 hours after first dose. Take no more than 2 doses in a day. 9 tablet 3   • tretinoin (RETIN-A) 0.025 % cream Apply 1 application topically as needed         No current facility-administered medications for this visit. Allergies   Allergen Reactions   • Nickel    • Other      H[place]  Dust mites        Review of Systems   Constitutional: Negative for appetite change, fatigue and fever. HENT: Negative. Negative for hearing loss, tinnitus, trouble swallowing and voice change. Eyes: Positive for photophobia, pain and visual disturbance (Floaters). Respiratory: Negative. Negative for shortness of breath. Cardiovascular: Negative. Negative for palpitations. Gastrointestinal: Negative. Negative for nausea and vomiting. Endocrine: Negative. Negative for cold intolerance. Genitourinary: Negative. Negative for dysuria, frequency and urgency. Musculoskeletal: Negative for back pain, gait problem, myalgias and neck pain. Skin: Negative. Negative for rash. Allergic/Immunologic: Negative. Neurological: Positive for dizziness, facial asymmetry, weakness (Sometimes), light-headedness (Sometimes) and numbness (Sometimes). Negative for tremors, seizures, syncope, speech difficulty and headaches. Hematological: Bruises/bleeds easily (Bruises easily). Psychiatric/Behavioral: Negative. Negative for confusion, hallucinations and sleep disturbance. Video Exam    Physical Exam  Eyes:      General: No visual field deficit. Neurological:      Mental Status: She is alert and oriented to person, place, and time. Cranial Nerves: No dysarthria or facial asymmetry. Sensory: No sensory deficit.         Visit Time  Total Visit Duration: 25 min

## 2023-09-18 DIAGNOSIS — E03.8 HYPOTHYROIDISM DUE TO HASHIMOTO'S THYROIDITIS: ICD-10-CM

## 2023-09-18 DIAGNOSIS — E06.3 HYPOTHYROIDISM DUE TO HASHIMOTO'S THYROIDITIS: ICD-10-CM

## 2023-09-18 RX ORDER — LEVOTHYROXINE SODIUM 88 UG/1
TABLET ORAL
Qty: 90 TABLET | Refills: 1 | Status: SHIPPED | OUTPATIENT
Start: 2023-09-18 | End: 2023-11-08

## 2023-09-25 ENCOUNTER — OFFICE VISIT (OUTPATIENT)
Dept: URGENT CARE | Age: 58
End: 2023-09-25
Payer: COMMERCIAL

## 2023-09-25 VITALS
DIASTOLIC BLOOD PRESSURE: 91 MMHG | HEART RATE: 80 BPM | SYSTOLIC BLOOD PRESSURE: 130 MMHG | RESPIRATION RATE: 18 BRPM | OXYGEN SATURATION: 99 % | TEMPERATURE: 97.6 F

## 2023-09-25 DIAGNOSIS — J01.41 ACUTE RECURRENT PANSINUSITIS: Primary | ICD-10-CM

## 2023-09-25 PROCEDURE — 99213 OFFICE O/P EST LOW 20 MIN: CPT

## 2023-09-25 RX ORDER — FLUCONAZOLE 150 MG/1
150 TABLET ORAL ONCE
Qty: 2 TABLET | Refills: 0 | Status: SHIPPED | OUTPATIENT
Start: 2023-09-25 | End: 2023-09-25

## 2023-09-25 RX ORDER — AMOXICILLIN AND CLAVULANATE POTASSIUM 875; 125 MG/1; MG/1
1 TABLET, FILM COATED ORAL EVERY 12 HOURS SCHEDULED
Qty: 14 TABLET | Refills: 0 | Status: SHIPPED | OUTPATIENT
Start: 2023-09-25 | End: 2023-10-02

## 2023-09-25 NOTE — PROGRESS NOTES
[facility]        NAME: Aurora King is a 66 y.o. female  : 1965    MRN: [id number]  DATE: 2023  TIME: 6:36 PM    Assessment and Plan   Acute recurrent pansinusitis [J01.41]  1. Acute recurrent pansinusitis  amoxicillin-clavulanate (AUGMENTIN) 875-125 mg per tablet    fluconazole (DIFLUCAN) 150 mg tablet            Patient Instructions     Start antibiotic as prescribed  Vitamin D3 2000 IU daily  Vitamin C 1000mg twice per day  Multivitamin daily  Fluids and rest  Over the counter cold medication as needed (EX: Coricidin HBP, tylenol/motrin)  Follow up with PCP in 3-5 days. Proceed to ER if symptoms worsen. Eat yogurt with live and active cultures and/or take a probiotic at least 3 hours before or after antibiotic dose. Monitor stool for diarrhea and/or blood. If this occurs, contact primary care doctor ASAP. Chief Complaint     Chief Complaint   Patient presents with   • Cough     Mild cough, sore throat, head pressure, HA, Sx for about 2 weeks. Sx were getting  better last week but for the past few days have gotten worse. History of Present Illness       Patient is a 67 yo female with no significant PMH presenting in the clinic today for cold sx x 2 weeks. Admits fever, chills, sinus pain, sinus pressure, cough, headache, b/l ear pain (R>L) and sore throat. Denies chest pain, SOB, abdominal pain, n/v/d. Admits the use of Mucinex, Flonase, and tylenol for sx management. Denies recent sick contacts. Admits h/o recurrent sinusitis. Patient mentions taking an at home rapid covid test which was negative. Review of Systems   Review of Systems   Constitutional: Negative for chills, fatigue and fever. HENT: Positive for sinus pressure, sinus pain and sore throat. Negative for congestion, ear pain, postnasal drip and rhinorrhea. Respiratory: Positive for cough. Negative for shortness of breath. Cardiovascular: Negative for chest pain.    Gastrointestinal: Negative for abdominal pain, diarrhea, nausea and vomiting. Musculoskeletal: Negative for myalgias. Skin: Negative for rash. Neurological: Positive for headaches. Current Medications       Current Outpatient Medications:   •  amitriptyline (ELAVIL) 10 mg tablet, Take 2 tablets (20 mg total) by mouth daily at bedtime Can increase by 1 tablet every week as needed for migraine relief up to a maximum of 50 mg at night, Disp: 270 tablet, Rfl: 3  •  amLODIPine (NORVASC) 2.5 mg tablet, Take 1 tablet (2.5 mg total) by mouth daily, Disp: 90 tablet, Rfl: 1  •  amoxicillin-clavulanate (AUGMENTIN) 875-125 mg per tablet, Take 1 tablet by mouth every 12 (twelve) hours for 7 days, Disp: 14 tablet, Rfl: 0  •  fluconazole (DIFLUCAN) 150 mg tablet, Take 1 tablet (150 mg total) by mouth once for 1 dose, Disp: 2 tablet, Rfl: 0  •  fluticasone (FLONASE) 50 mcg/act nasal spray, 2 sprays into each nostril daily, Disp: 16 mL, Rfl: 2  •  levothyroxine 88 mcg tablet, Take 1 tablet in the AM on an empty stomach, 45 minutes prior to taking food or other medications. , Disp: 90 tablet, Rfl: 1  •  lisinopril (ZESTRIL) 40 mg tablet, Take 1 tablet (40 mg total) by mouth daily, Disp: 90 tablet, Rfl: 1  •  rizatriptan (Maxalt) 10 mg tablet, Take 1 tablet (10 mg total) by mouth as needed for migraine Take at the onset of migraine; if symptoms continue or return, may take another dose at least 2 hours after first dose.  Take no more than 2 doses in a day., Disp: 9 tablet, Rfl: 3  •  tretinoin (RETIN-A) 0.025 % cream, Apply 1 application topically as needed  , Disp: , Rfl:     Current Allergies     Allergies as of 09/25/2023 - Reviewed 09/25/2023   Allergen Reaction Noted   • Nickel  09/01/2020   • Other  02/01/2018            The following portions of the patient's history were reviewed and updated as appropriate: allergies, current medications, past family history, past medical history, past social history, past surgical history and problem list.     Past Medical History:   Diagnosis Date   • Asthma    • Disease of thyroid gland    • Hypertension    • Migraine headache    • Tobacco use        Past Surgical History:   Procedure Laterality Date   • DILATION AND CURETTAGE OF UTERUS      several   • ENDOMETRIAL ABLATION     • LAPAROSCOPY      Abdominal   • TUBAL LIGATION  1997   • WISDOM TOOTH EXTRACTION  1985       Family History   Problem Relation Age of Onset   • Diabetes Mother    • Hypertension Mother    • Atrial fibrillation Mother    • Kidney cancer Mother    • Stroke Father    • Heart attack Father    • Stroke Maternal Grandmother    • Diabetes Maternal Grandmother    • Stroke Paternal Grandmother    • Diabetes Paternal Grandmother          Medications have been verified. Objective   /91   Pulse 80   Temp 97.6 °F (36.4 °C) (Tympanic)   Resp 18   SpO2 99%        Physical Exam     Physical Exam  Vitals reviewed. Constitutional:       General: She is not in acute distress. Appearance: Normal appearance. She is normal weight. She is not ill-appearing. HENT:      Head: Normocephalic. Right Ear: Hearing, ear canal and external ear normal. A middle ear effusion is present. There is no impacted cerumen. Tympanic membrane is not erythematous or bulging. Left Ear: Hearing, ear canal and external ear normal. A middle ear effusion is present. There is no impacted cerumen. Tympanic membrane is not erythematous or bulging. Nose: Congestion present. No rhinorrhea. Right Sinus: Maxillary sinus tenderness and frontal sinus tenderness present. Left Sinus: Maxillary sinus tenderness and frontal sinus tenderness present. Mouth/Throat:      Lips: Pink. Mouth: Mucous membranes are moist.      Pharynx: Oropharynx is clear. Uvula midline. No pharyngeal swelling, oropharyngeal exudate, posterior oropharyngeal erythema or uvula swelling. Tonsils: No tonsillar exudate or tonsillar abscesses.  1+ on the right. 1+ on the left. Eyes:      General:         Right eye: No discharge. Left eye: No discharge. Conjunctiva/sclera: Conjunctivae normal.   Cardiovascular:      Rate and Rhythm: Normal rate and regular rhythm. Pulses: Normal pulses. Heart sounds: Normal heart sounds. No murmur heard. No friction rub. No gallop. Pulmonary:      Effort: Pulmonary effort is normal.      Breath sounds: Normal breath sounds. No wheezing, rhonchi or rales. Musculoskeletal:      Cervical back: Normal range of motion and neck supple. No tenderness. Lymphadenopathy:      Cervical: Cervical adenopathy present. Skin:     General: Skin is warm. Findings: No rash. Neurological:      Mental Status: She is alert.    Psychiatric:         Mood and Affect: Mood normal.         Behavior: Behavior normal.

## 2023-09-25 NOTE — PATIENT INSTRUCTIONS
Start antibiotic as prescribed  Vitamin D3 2000 IU daily  Vitamin C 1000mg twice per day  Multivitamin daily  Fluids and rest  Over the counter cold medication as needed (EX: Coricidin HBP, tylenol/motrin)  Follow up with PCP in 3-5 days. Proceed to ER if symptoms worsen. Eat yogurt with live and active cultures and/or take a probiotic at least 3 hours before or after antibiotic dose. Monitor stool for diarrhea and/or blood. If this occurs, contact primary care doctor ASAP.

## 2023-10-05 ENCOUNTER — OFFICE VISIT (OUTPATIENT)
Dept: URGENT CARE | Age: 58
End: 2023-10-05
Payer: COMMERCIAL

## 2023-10-05 VITALS
TEMPERATURE: 98.2 F | DIASTOLIC BLOOD PRESSURE: 78 MMHG | HEART RATE: 78 BPM | OXYGEN SATURATION: 99 % | SYSTOLIC BLOOD PRESSURE: 122 MMHG

## 2023-10-05 DIAGNOSIS — R05.1 ACUTE COUGH: ICD-10-CM

## 2023-10-05 DIAGNOSIS — J06.9 VIRAL URI: Primary | ICD-10-CM

## 2023-10-05 LAB
SARS-COV-2 AG UPPER RESP QL IA: NEGATIVE
VALID CONTROL: NORMAL

## 2023-10-05 PROCEDURE — 99213 OFFICE O/P EST LOW 20 MIN: CPT | Performed by: EMERGENCY MEDICINE

## 2023-10-05 PROCEDURE — 87811 SARS-COV-2 COVID19 W/OPTIC: CPT | Performed by: EMERGENCY MEDICINE

## 2023-10-07 NOTE — PROGRESS NOTES
[facility]        NAME: Kayla Nolan is a 66 y.o. female  : 1965    MRN: [id number]  DATE: 2023  TIME: 9:04 AM    Assessment and Plan   Viral URI [J06.9]  1. Viral URI        2. Acute cough  Poct Covid 19 Rapid Antigen Test        -Rapid COVID-negative, patient with enlarged right anterior cervical lymph node likely reactive without associated respiratory compromise  -Patient denies constitutional symptoms to include unintended weight loss, night sweats, fever, advised patient to follow-up closely with her primary care physician regarding enlarged lymph node and other symptoms  -Patient may have been reinfected with additional viral infection, do not feel this is lingering bacterial infection given lack of fever, improvement of symptoms overall after antibiotics  -Advised patient to seek care in ED if she develops worsening symptoms to include difficulty swallowing, trismus, drooling or difficulty breathing or otherwise worsening of symptoms  -Anticipatory guidance given regarding continued supportive care at home to include the use of Afrin and saline nasal sprays, over-the-counter cold or flu medications without phenylephrine or pseudoephedrine, Kalie pot and humidifier as well as honey and salt water gargles  -All questions answered at bedside, patient is amenable to plan and voiced understanding    Patient Instructions       Follow up with PCP in 3-5 days. Proceed to  ER if symptoms worsen. Chief Complaint     Chief Complaint   Patient presents with   • Sore Throat     Patient relates yesterday started sinus pressure, swollen lymph nodes of neck. Sore/scratchy throat. Was on ABX last week for similar symptoms.          History of Present Illness       53-year-old female with history of allergic rhinitis, hypothyroidism, hypertension, migraine headaches, anxiety, hyperlipidemia presents with a chief complaint of postnasal drip, sore throat, nasal congestion and bilateral maxillary and frontal sinus pressure which began yesterday. Of note, patient was seen in clinic on 9/25/2023, diagnosed with pansinusitis and treated with Augmentin which she states improved her symptoms in the interim. She states that she is not feeling as much sinus pressure and states that she did have nasal congestion and sore throat last week when treated and does admit that they have gotten overall better. She denies associated fever, mucopurulent discharge, chest pain, shortness of breath. States that her  at home is now sick with similar symptoms. Denies nausea, vomiting or diarrhea. States that she noticed a "lump" on the side of her neck which has been there for the past week however she states that has gotten "larger" since her symptoms worsen. She denies associated stridor, trismus or difficulty breathing. Patient has tried taking Flonase without relief. Has not tried other nasal sprays or over-the-counter decongestants. Sore Throat   This is a recurrent problem. The current episode started yesterday. The problem has been waxing and waning. There has been no fever. The pain is at a severity of 2/10. The pain is mild. Associated symptoms include congestion, coughing and swollen glands. Pertinent negatives include no abdominal pain, diarrhea, drooling, ear discharge, ear pain, headaches, hoarse voice, plugged ear sensation, neck pain, shortness of breath, stridor, trouble swallowing or vomiting. Review of Systems   Review of Systems   Constitutional: Negative for activity change, appetite change, chills, diaphoresis, fatigue, fever and unexpected weight change. HENT: Positive for congestion, postnasal drip, rhinorrhea, sinus pressure, sinus pain, sneezing and sore throat. Negative for dental problem, drooling, ear discharge, ear pain, facial swelling, hearing loss, hoarse voice, mouth sores, nosebleeds, tinnitus, trouble swallowing and voice change.     Eyes: Negative for photophobia, pain, discharge, redness, itching and visual disturbance. Respiratory: Positive for cough. Negative for apnea, choking, chest tightness, shortness of breath, wheezing and stridor. Cardiovascular: Negative for chest pain, palpitations and leg swelling. Gastrointestinal: Negative for abdominal distention, abdominal pain, anal bleeding, blood in stool, constipation, diarrhea, nausea, rectal pain and vomiting. Genitourinary: Negative for dysuria and hematuria. Musculoskeletal: Negative for arthralgias, back pain, gait problem, joint swelling, myalgias, neck pain and neck stiffness. Skin: Negative for color change and rash. Neurological: Negative for dizziness, tremors, seizures, syncope, facial asymmetry, speech difficulty, weakness, light-headedness, numbness and headaches. All other systems reviewed and are negative. Current Medications       Current Outpatient Medications:   •  amitriptyline (ELAVIL) 10 mg tablet, Take 2 tablets (20 mg total) by mouth daily at bedtime Can increase by 1 tablet every week as needed for migraine relief up to a maximum of 50 mg at night, Disp: 270 tablet, Rfl: 3  •  amLODIPine (NORVASC) 2.5 mg tablet, Take 1 tablet (2.5 mg total) by mouth daily, Disp: 90 tablet, Rfl: 1  •  fluticasone (FLONASE) 50 mcg/act nasal spray, 2 sprays into each nostril daily, Disp: 16 mL, Rfl: 2  •  levothyroxine 88 mcg tablet, Take 1 tablet in the AM on an empty stomach, 45 minutes prior to taking food or other medications. , Disp: 90 tablet, Rfl: 1  •  lisinopril (ZESTRIL) 40 mg tablet, Take 1 tablet (40 mg total) by mouth daily, Disp: 90 tablet, Rfl: 1  •  rizatriptan (Maxalt) 10 mg tablet, Take 1 tablet (10 mg total) by mouth as needed for migraine Take at the onset of migraine; if symptoms continue or return, may take another dose at least 2 hours after first dose.  Take no more than 2 doses in a day., Disp: 9 tablet, Rfl: 3  •  tretinoin (RETIN-A) 0.025 % cream, Apply 1 application topically as needed  , Disp: , Rfl:     Current Allergies     Allergies as of 10/05/2023 - Reviewed 10/05/2023   Allergen Reaction Noted   • Nickel  09/01/2020   • Other  02/01/2018            The following portions of the patient's history were reviewed and updated as appropriate: allergies, current medications, past family history, past medical history, past social history, past surgical history and problem list.     Past Medical History:   Diagnosis Date   • Asthma    • Disease of thyroid gland    • Hypertension    • Migraine headache    • Tobacco use        Past Surgical History:   Procedure Laterality Date   • DILATION AND CURETTAGE OF UTERUS      several   • ENDOMETRIAL ABLATION     • LAPAROSCOPY      Abdominal   • TUBAL LIGATION  1997   • WISDOM TOOTH EXTRACTION  1985       Family History   Problem Relation Age of Onset   • Diabetes Mother    • Hypertension Mother    • Atrial fibrillation Mother    • Kidney cancer Mother    • Stroke Father    • Heart attack Father    • Stroke Maternal Grandmother    • Diabetes Maternal Grandmother    • Stroke Paternal Grandmother    • Diabetes Paternal Grandmother          Medications have been verified. Objective   /78   Pulse 78   Temp 98.2 °F (36.8 °C)   SpO2 99%   No LMP recorded. Patient is postmenopausal.       Physical Exam     Physical Exam  Vitals and nursing note reviewed. Constitutional:       General: She is not in acute distress. Appearance: She is not ill-appearing, toxic-appearing or diaphoretic. HENT:      Head: Normocephalic and atraumatic. Right Ear: Tympanic membrane and ear canal normal. No drainage, swelling or tenderness. No middle ear effusion. Tympanic membrane is not erythematous. Left Ear: Tympanic membrane and ear canal normal. No drainage, swelling or tenderness. No middle ear effusion. Tympanic membrane is not erythematous. Nose: Nose normal. No congestion or rhinorrhea.       Mouth/Throat: Mouth: Mucous membranes are moist. No oral lesions. Pharynx: Posterior oropharyngeal erythema present. No pharyngeal swelling, oropharyngeal exudate or uvula swelling. Tonsils: No tonsillar exudate or tonsillar abscesses. 0 on the right. 0 on the left. Eyes:      Extraocular Movements:      Right eye: Normal extraocular motion. Left eye: Normal extraocular motion. Conjunctiva/sclera: Conjunctivae normal.      Pupils: Pupils are equal, round, and reactive to light. Neck:      Thyroid: No thyromegaly. Comments: Tender, enlarged right anterior cervical lymph node appreciated, no tracheal deviation or stridor noted  Cardiovascular:      Rate and Rhythm: Normal rate and regular rhythm. Pulses: Normal pulses. Heart sounds: Normal heart sounds. Pulmonary:      Effort: Pulmonary effort is normal. No respiratory distress. Breath sounds: Normal breath sounds. No stridor. No wheezing, rhonchi or rales. Chest:      Chest wall: No tenderness. Abdominal:      General: There is no distension. Palpations: There is no mass. Tenderness: There is no abdominal tenderness. There is no right CVA tenderness, left CVA tenderness, guarding or rebound. Hernia: No hernia is present. Musculoskeletal:      Cervical back: Normal range of motion and neck supple. Lymphadenopathy:      Cervical: Cervical adenopathy present. Skin:     General: Skin is warm and dry. Capillary Refill: Capillary refill takes less than 2 seconds. Coloration: Skin is not pale. Findings: No erythema or rash. Neurological:      General: No focal deficit present. Mental Status: She is alert.

## 2023-11-06 ENCOUNTER — APPOINTMENT (OUTPATIENT)
Dept: LAB | Facility: HOSPITAL | Age: 58
End: 2023-11-06
Payer: COMMERCIAL

## 2023-11-06 DIAGNOSIS — I10 ESSENTIAL HYPERTENSION: ICD-10-CM

## 2023-11-06 DIAGNOSIS — E03.8 HYPOTHYROIDISM DUE TO HASHIMOTO'S THYROIDITIS: ICD-10-CM

## 2023-11-06 DIAGNOSIS — E06.3 HYPOTHYROIDISM DUE TO HASHIMOTO'S THYROIDITIS: ICD-10-CM

## 2023-11-06 LAB
ALBUMIN SERPL BCP-MCNC: 4.4 G/DL (ref 3.5–5)
ALP SERPL-CCNC: 96 U/L (ref 34–104)
ALT SERPL W P-5'-P-CCNC: 20 U/L (ref 7–52)
ANION GAP SERPL CALCULATED.3IONS-SCNC: 8 MMOL/L
AST SERPL W P-5'-P-CCNC: 19 U/L (ref 13–39)
BASOPHILS # BLD AUTO: 0.05 THOUSANDS/ÂΜL (ref 0–0.1)
BASOPHILS NFR BLD AUTO: 1 % (ref 0–1)
BILIRUB SERPL-MCNC: 0.46 MG/DL (ref 0.2–1)
BUN SERPL-MCNC: 20 MG/DL (ref 5–25)
CALCIUM SERPL-MCNC: 9.5 MG/DL (ref 8.4–10.2)
CHLORIDE SERPL-SCNC: 101 MMOL/L (ref 96–108)
CHOLEST SERPL-MCNC: 223 MG/DL
CO2 SERPL-SCNC: 25 MMOL/L (ref 21–32)
CREAT SERPL-MCNC: 0.99 MG/DL (ref 0.6–1.3)
EOSINOPHIL # BLD AUTO: 0.12 THOUSAND/ÂΜL (ref 0–0.61)
EOSINOPHIL NFR BLD AUTO: 1 % (ref 0–6)
ERYTHROCYTE [DISTWIDTH] IN BLOOD BY AUTOMATED COUNT: 11.8 % (ref 11.6–15.1)
GFR SERPL CREATININE-BSD FRML MDRD: 63 ML/MIN/1.73SQ M
GLUCOSE SERPL-MCNC: 110 MG/DL (ref 65–140)
HCT VFR BLD AUTO: 42.6 % (ref 34.8–46.1)
HDLC SERPL-MCNC: 72 MG/DL
HGB BLD-MCNC: 15 G/DL (ref 11.5–15.4)
IMM GRANULOCYTES # BLD AUTO: 0.02 THOUSAND/UL (ref 0–0.2)
IMM GRANULOCYTES NFR BLD AUTO: 0 % (ref 0–2)
LDLC SERPL CALC-MCNC: 121 MG/DL (ref 0–100)
LYMPHOCYTES # BLD AUTO: 3.03 THOUSANDS/ÂΜL (ref 0.6–4.47)
LYMPHOCYTES NFR BLD AUTO: 36 % (ref 14–44)
MCH RBC QN AUTO: 31.6 PG (ref 26.8–34.3)
MCHC RBC AUTO-ENTMCNC: 35.2 G/DL (ref 31.4–37.4)
MCV RBC AUTO: 90 FL (ref 82–98)
MONOCYTES # BLD AUTO: 0.67 THOUSAND/ÂΜL (ref 0.17–1.22)
MONOCYTES NFR BLD AUTO: 8 % (ref 4–12)
NEUTROPHILS # BLD AUTO: 4.6 THOUSANDS/ÂΜL (ref 1.85–7.62)
NEUTS SEG NFR BLD AUTO: 54 % (ref 43–75)
NONHDLC SERPL-MCNC: 151 MG/DL
NRBC BLD AUTO-RTO: 0 /100 WBCS
PLATELET # BLD AUTO: 376 THOUSANDS/UL (ref 149–390)
PMV BLD AUTO: 9.2 FL (ref 8.9–12.7)
POTASSIUM SERPL-SCNC: 4 MMOL/L (ref 3.5–5.3)
PROT SERPL-MCNC: 7 G/DL (ref 6.4–8.4)
RBC # BLD AUTO: 4.74 MILLION/UL (ref 3.81–5.12)
SODIUM SERPL-SCNC: 134 MMOL/L (ref 135–147)
TRIGL SERPL-MCNC: 149 MG/DL
TSH SERPL DL<=0.05 MIU/L-ACNC: 2.17 UIU/ML (ref 0.45–4.5)
WBC # BLD AUTO: 8.49 THOUSAND/UL (ref 4.31–10.16)

## 2023-11-06 PROCEDURE — 36415 COLL VENOUS BLD VENIPUNCTURE: CPT

## 2023-11-06 PROCEDURE — 84443 ASSAY THYROID STIM HORMONE: CPT

## 2023-11-06 PROCEDURE — 80061 LIPID PANEL: CPT

## 2023-11-06 PROCEDURE — 85025 COMPLETE CBC W/AUTO DIFF WBC: CPT

## 2023-11-08 ENCOUNTER — OFFICE VISIT (OUTPATIENT)
Dept: INTERNAL MEDICINE CLINIC | Facility: OTHER | Age: 58
End: 2023-11-08
Payer: COMMERCIAL

## 2023-11-08 VITALS
HEART RATE: 80 BPM | TEMPERATURE: 98.1 F | SYSTOLIC BLOOD PRESSURE: 110 MMHG | HEIGHT: 63 IN | WEIGHT: 148 LBS | OXYGEN SATURATION: 98 % | BODY MASS INDEX: 26.22 KG/M2 | DIASTOLIC BLOOD PRESSURE: 70 MMHG

## 2023-11-08 DIAGNOSIS — Q04.8 CEREBELLAR TONSILLAR ECTOPIA (HCC): ICD-10-CM

## 2023-11-08 DIAGNOSIS — I10 HTN (HYPERTENSION), BENIGN: Primary | ICD-10-CM

## 2023-11-08 DIAGNOSIS — E06.3 HYPOTHYROIDISM DUE TO HASHIMOTO'S THYROIDITIS: ICD-10-CM

## 2023-11-08 DIAGNOSIS — F41.9 ANXIETY: ICD-10-CM

## 2023-11-08 DIAGNOSIS — E78.49 OTHER HYPERLIPIDEMIA: ICD-10-CM

## 2023-11-08 DIAGNOSIS — I10 ESSENTIAL HYPERTENSION: ICD-10-CM

## 2023-11-08 DIAGNOSIS — E03.8 HYPOTHYROIDISM DUE TO HASHIMOTO'S THYROIDITIS: ICD-10-CM

## 2023-11-08 DIAGNOSIS — J30.1 NON-SEASONAL ALLERGIC RHINITIS DUE TO POLLEN: ICD-10-CM

## 2023-11-08 PROCEDURE — 99214 OFFICE O/P EST MOD 30 MIN: CPT | Performed by: INTERNAL MEDICINE

## 2023-11-08 RX ORDER — AMLODIPINE BESYLATE 2.5 MG/1
2.5 TABLET ORAL DAILY
Qty: 90 TABLET | Refills: 1 | Status: SHIPPED | OUTPATIENT
Start: 2023-11-08

## 2023-11-08 RX ORDER — LISINOPRIL 40 MG/1
40 TABLET ORAL DAILY
Qty: 90 TABLET | Refills: 1 | Status: SHIPPED | OUTPATIENT
Start: 2023-11-08

## 2023-11-09 PROBLEM — D72.829 LEUKOCYTOSIS: Status: RESOLVED | Noted: 2023-05-17 | Resolved: 2023-11-09

## 2023-11-09 PROBLEM — E87.1 HYPONATREMIA: Status: RESOLVED | Noted: 2023-05-17 | Resolved: 2023-11-09

## 2023-11-09 PROBLEM — Q04.8 CEREBELLAR TONSILLAR ECTOPIA (HCC): Status: ACTIVE | Noted: 2023-11-09

## 2023-11-09 PROBLEM — R23.3 EASY BRUISING: Status: RESOLVED | Noted: 2022-11-08 | Resolved: 2023-11-09

## 2023-11-09 NOTE — PROGRESS NOTES
Assessment/Plan:    Hypothyroidism  Stable, asymptomatic. Continue to monitor medically. Trend TSH. Non-seasonal allergic rhinitis due to pollen  Continue Flonase as needed. Migraine without aura and without status migrainosus, not intractable  Continue amitriptyline as per neurology. Continue follow-up with neurology. Essential hypertension  Controlled. Continue current antihypertensive regimen. Other hyperlipidemia  Stable. Discussed diet and exercise. Anxiety  Stable. Continue supportive care. Cerebellar tonsillar ectopia ([address])  Continue follow-up with neurology. 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 Chief Complaint   Patient presents with   • Follow-up     6 m f/u visit for hypertension, review labs form 11/6/23. Needs a BMI f/u plan. Declines a flu vaccine. 53-year-old female seen today for follow-up of chronic conditions  Laboratory studies reviewed today: CBC, CMP, lipid panel, TSH are stable and within acceptable range. She has been compliant with her medication regimen. She continues to have recurrent episodes of lightheadedness and head pressure. She denies any orthostasis or vertigo-like symptoms. Lightheadedness is intermittent and unprovoked. She also reports floaters. Migraine headaches: She reports improvement in migraine headaches. She follows up with neurology and has been compliant with amitriptyline for migraine prevention.   She recently had an MRI of the brain which showed cerebellar tonsils being displaced inferiorly below the foramen magnum approximately 7 mm however without compression and mass effect. Hypertension  This is a chronic problem. The current episode started more than 1 year ago. The problem is unchanged. The problem is controlled. Pertinent negatives include no chest pain, headaches, palpitations or shortness of breath. Past treatments include calcium channel blockers and ACE inhibitors. The current treatment provides moderate improvement. There are no compliance problems. Migraine  This is a recurrent problem. The current episode started more than 1 year ago. The problem occurs monthly. Pertinent negatives include no abdominal pain, coughing, diarrhea, dizziness, fever, nausea, numbness, rhinorrhea, sinus pressure, sore throat, vomiting or weakness. Past treatments include antidepressants. The following portions of the patient's history were reviewed and updated as appropriate: allergies, current medications, past family history, past medical history, past social history, past surgical history, and problem list.    Review of Systems   Constitutional:  Negative for activity change, appetite change, chills, diaphoresis, fatigue and fever. HENT:  Negative for congestion, postnasal drip, rhinorrhea, sinus pressure, sinus pain, sneezing and sore throat. Eyes:  Negative for visual disturbance. Respiratory:  Negative for apnea, cough, choking, chest tightness, shortness of breath and wheezing. Cardiovascular:  Negative for chest pain, palpitations and leg swelling. Gastrointestinal:  Negative for abdominal distention, abdominal pain, anal bleeding, blood in stool, constipation, diarrhea, nausea and vomiting. Endocrine: Negative for cold intolerance and heat intolerance. Genitourinary:  Negative for difficulty urinating, dysuria and hematuria. Musculoskeletal: Negative. Skin: Negative. Neurological:  Positive for light-headedness.  Negative for dizziness, weakness, numbness and headaches. Hematological:  Negative for adenopathy. Psychiatric/Behavioral:  Negative for agitation, sleep disturbance and suicidal ideas. All other systems reviewed and are negative. Past Medical History:   Diagnosis Date   • Asthma    • Disease of thyroid gland    • Hypertension    • Migraine headache    • Tobacco use          Current Outpatient Medications:   •  amitriptyline (ELAVIL) 10 mg tablet, Take 2 tablets (20 mg total) by mouth daily at bedtime Can increase by 1 tablet every week as needed for migraine relief up to a maximum of 50 mg at night, Disp: 270 tablet, Rfl: 3  •  amLODIPine (NORVASC) 2.5 mg tablet, Take 1 tablet (2.5 mg total) by mouth daily, Disp: 90 tablet, Rfl: 1  •  fluticasone (FLONASE) 50 mcg/act nasal spray, 2 sprays into each nostril daily, Disp: 16 mL, Rfl: 2  •  lisinopril (ZESTRIL) 40 mg tablet, Take 1 tablet (40 mg total) by mouth daily, Disp: 90 tablet, Rfl: 1  •  rizatriptan (Maxalt) 10 mg tablet, Take 1 tablet (10 mg total) by mouth as needed for migraine Take at the onset of migraine; if symptoms continue or return, may take another dose at least 2 hours after first dose.  Take no more than 2 doses in a day., Disp: 9 tablet, Rfl: 3  •  tretinoin (RETIN-A) 0.025 % cream, Apply 1 application topically as needed  , Disp: , Rfl:     Allergies   Allergen Reactions   • Nickel    • Other      [place]  Dust mites        Social History   Past Surgical History:   Procedure Laterality Date   • DILATION AND CURETTAGE OF UTERUS      several   • ENDOMETRIAL ABLATION     • LAPAROSCOPY      Abdominal   • TUBAL LIGATION  1997   • WISDOM TOOTH EXTRACTION  1985     Family History   Problem Relation Age of Onset   • Diabetes Mother    • Hypertension Mother    • Atrial fibrillation Mother    • Kidney cancer Mother    • Stroke Mother         occipital stroke 11/2022   • Thyroid disease Mother    • Stroke Father    • Heart attack Father    • Stroke Maternal Grandmother    • Diabetes Maternal Grandmother    • Stroke Paternal Grandmother    • Diabetes Paternal Grandmother    • Thyroid disease Maternal Aunt        Objective:  /70 (BP Location: Left arm, Patient Position: Sitting, Cuff Size: Standard)   Pulse 80   Temp 98.1 °F (36.7 °C) (Temporal)   Ht 5' 3" (1.6 m)   Wt 67.1 kg (148 lb)   SpO2 98%   BMI 26.22 kg/m²     Recent Results (from the past 1344 hour(s))   Poct Covid 19 Rapid Antigen Test    Collection Time: 10/05/23  7:01 PM   Result Value Ref Range    POCT SARS-CoV-2 Ag Negative Negative    VALID CONTROL Valid    Comprehensive metabolic panel    Collection Time: 11/06/23  1:06 PM   Result Value Ref Range    Sodium 134 (L) 135 - 147 mmol/L    Potassium 4.0 3.5 - 5.3 mmol/L    Chloride 101 96 - 108 mmol/L    CO2 25 21 - 32 mmol/L    ANION GAP 8 mmol/L    BUN 20 5 - 25 mg/dL    Creatinine 0.99 0.60 - 1.30 mg/dL    Glucose 110 65 - 140 mg/dL    Calcium 9.5 8.4 - 10.2 mg/dL    AST 19 13 - 39 U/L    ALT 20 7 - 52 U/L    Alkaline Phosphatase 96 34 - 104 U/L    Total Protein 7.0 6.4 - 8.4 g/dL    Albumin 4.4 3.5 - 5.0 g/dL    Total Bilirubin 0.46 0.20 - 1.00 mg/dL    eGFR 63 ml/min/1.73sq m   CBC and differential    Collection Time: 11/06/23  1:06 PM   Result Value Ref Range    WBC 8.49 4.31 - 10.16 Thousand/uL    RBC 4.74 3.81 - 5.12 Million/uL    Hemoglobin 15.0 11.5 - 15.4 g/dL    Hematocrit 42.6 34.8 - 46.1 %    MCV 90 82 - 98 fL    MCH 31.6 26.8 - 34.3 pg    MCHC 35.2 31.4 - 37.4 g/dL    RDW 11.8 11.6 - 15.1 %    MPV 9.2 8.9 - 12.7 fL    Platelets 004 965 - 968 Thousands/uL    nRBC 0 /100 WBCs    Neutrophils Relative 54 43 - 75 %    Immat GRANS % 0 0 - 2 %    Lymphocytes Relative 36 14 - 44 %    Monocytes Relative 8 4 - 12 %    Eosinophils Relative 1 0 - 6 %    Basophils Relative 1 0 - 1 %    Neutrophils Absolute 4.60 1.85 - 7.62 Thousands/µL    Immature Grans Absolute 0.02 0.00 - 0.20 Thousand/uL    Lymphocytes Absolute 3.03 0.60 - 4.47 Thousands/µL    Monocytes Absolute 0.67 0.17 - 1.22 Thousand/µL    Eosinophils Absolute 0.12 0.00 - 0.61 Thousand/µL    Basophils Absolute 0.05 0.00 - 0.10 Thousands/µL   Lipid panel    Collection Time: 11/06/23  1:06 PM   Result Value Ref Range    Cholesterol 223 (H) See Comment mg/dL    Triglycerides 149 See Comment mg/dL    HDL, Direct 72 >=50 mg/dL    LDL Calculated 121 (H) 0 - 100 mg/dL    Non-HDL-Chol (CHOL-HDL) 151 mg/dl   TSH, 3rd generation with Free T4 reflex    Collection Time: 11/06/23  1:06 PM   Result Value Ref Range    TSH 3RD GENERATON 2.171 0.450 - 4.500 uIU/mL            Physical Exam  Vitals and nursing note reviewed. Constitutional:       General: She is not in acute distress. Appearance: She is well-developed. She is not diaphoretic. HENT:      Head: Normocephalic and atraumatic. Eyes:      General:         Right eye: No discharge. Left eye: No discharge. Conjunctiva/sclera: Conjunctivae normal.      Pupils: Pupils are equal, round, and reactive to light. Neck:      Thyroid: No thyromegaly. Vascular: No JVD. Cardiovascular:      Rate and Rhythm: Normal rate and regular rhythm. Heart sounds: Normal heart sounds. No murmur heard. No friction rub. No gallop. Pulmonary:      Effort: Pulmonary effort is normal. No respiratory distress. Breath sounds: Normal breath sounds. No wheezing or rales. Chest:      Chest wall: No tenderness. Abdominal:      General: There is no distension. Palpations: Abdomen is soft. Tenderness: There is no abdominal tenderness. Musculoskeletal:         General: No tenderness or deformity. Normal range of motion. Cervical back: Normal range of motion and neck supple. Lymphadenopathy:      Cervical: No cervical adenopathy. Skin:     General: Skin is warm and dry. Coloration: Skin is not pale. Findings: No erythema or rash.    Neurological:      Mental Status: She is alert and oriented to person, place, and time. Cranial Nerves: No cranial nerve deficit. Coordination: Coordination normal.   Psychiatric:         Behavior: Behavior normal.         Thought Content:  Thought content normal.         Judgment: Judgment normal.

## 2024-01-15 DIAGNOSIS — I10 ESSENTIAL HYPERTENSION: ICD-10-CM

## 2024-01-15 RX ORDER — AMLODIPINE BESYLATE 2.5 MG/1
2.5 TABLET ORAL DAILY
Qty: 90 TABLET | Refills: 1 | Status: SHIPPED | OUTPATIENT
Start: 2024-01-15

## 2024-02-22 ENCOUNTER — TELEMEDICINE (OUTPATIENT)
Dept: NEUROLOGY | Facility: CLINIC | Age: 59
End: 2024-02-22
Payer: COMMERCIAL

## 2024-02-22 VITALS — WEIGHT: 145 LBS | BODY MASS INDEX: 25.69 KG/M2 | HEIGHT: 63 IN

## 2024-02-22 DIAGNOSIS — G43.109 MIGRAINE WITH AURA AND WITHOUT STATUS MIGRAINOSUS, NOT INTRACTABLE: Primary | ICD-10-CM

## 2024-02-22 PROCEDURE — 99213 OFFICE O/P EST LOW 20 MIN: CPT | Performed by: STUDENT IN AN ORGANIZED HEALTH CARE EDUCATION/TRAINING PROGRAM

## 2024-02-22 RX ORDER — AMITRIPTYLINE HYDROCHLORIDE 10 MG/1
10 TABLET, FILM COATED ORAL
Qty: 270 TABLET | Refills: 3 | Status: SHIPPED | OUTPATIENT
Start: 2024-02-22

## 2024-02-22 NOTE — PROGRESS NOTES
Virtual Regular Visit    Verification of patient location:    Patient is located at Home in the following state in which I hold an active license [place]      Assessment/Plan:  Migraines with visual/vestibular aura   Preventative:  - we discussed headache hygiene and lifestyle factors that may improve headaches, including sleep hygiene, proper hydration, limiting caffeine intake  - continue amitriptyline 10 mg qHS     Abortive:  - discussed not taking over-the-counter or prescription pain medications more than 3 days per week to prevent medication overuse/rebound headache  - maxalt 10 mg PRN at the onset of her migraines    Encounter provider Kirstin Gao MD    Provider located at [facility] [place]  [address]  [place] [address]      Recent Visits  No visits were found meeting these conditions.  Showing recent visits within past 7 days and meeting all other requirements  Today's Visits  Date Type Provider Dept   02/22/24 Telemedicine Kirstin Gao MD  [facility]   Showing today's visits and meeting all other requirements  Future Appointments  No visits were found meeting these conditions.  Showing future appointments within next 150 days and meeting all other requirements       The patient was identified by name and date of birth. Dee Cristina was informed that this is a telemedicine visit and that the visit is being conducted through the [employer] platform. She agrees to proceed..  My office door was closed. No one else was in the room.  She acknowledged consent and understanding of privacy and security of the video platform. The patient has agreed to participate and understands they can discontinue the visit at any time.    Patient is aware this is a billable service.     Subjective  Dee Cristina is a 59 y.o. female past medical history that includes HTN, hypothyroidism who presents for f/u of headaches.      Interval Hx  Continues  to have symptoms of head pressure at times accompanied by dizziness and photophobia previously concerning for possible migraines. She has been maintained on amitriptyline 10 mg qhs. She states that since then she has noticed an overall mild improvement in her symptoms since starting the medication, but she again has difficulty quantifying her total migraine days. She states that her current symptoms are better described as sinus pressure. She has not used maxalt at all since her last visit.      Previous Hx  Had sinus trouble for years. Pain above the eyes, tightness in the sides of her head. Often feels like she has water in her ears.      Mother had occipital stroke recently and she became more concerned     Has dizziness associated with the pressure. Dizziness always associated with the head pressures.      ent - saw several years ago     Feels subjective weakness in her arms at times. Feels like this is occurring when she is having the head pressure. Not associated with exertion.      Sees floaters in her vision associated with her headaches  Makes her feel tired/weak. Sometimes fireworks in vision     Headaches started at what age? 39  How often do the headaches occur?   - as of 3/20/2023: 6 out of 8 days  What time of the day do the headaches start?  No particular time of day   How long do the headaches last?   Are you ever headache free? Yes     Aura? with aura      Where is your headache located and pain quality? Periorbital  What is the intensity of pain?   Associated symptoms:   [] Nausea       [] Vomiting        [] Diarrhea  [] Stiff or sore neck   [] Problems with concentration  [x] Photophobia     []Phonophobia      [] Osmophobia  [] Blurred vision   [] Prefer quiet, dark room  [x] Light-headed or dizzy      [] Tinnitus   [x] Hands or feet tingle or feel numb/paresthesias       [] Ptosis      [] Facial droop  [] Lacrimation  [x] Nasal congestion/rhinorrhea          Things that make the headache worse?  No specific movements     Headache triggers: none identifiable      Have you seen someone else for headaches or pain? No  Have you had trigger point injection performed and how often? No  Have you had Botox injection performed and how often? No   Have you had epidural injections or transforaminal injections performed? No  Have you ever had any Brain imaging? Yes     What medications do you take or have you taken for your headaches?   ABORTIVE:    OTC medications have been ineffective   advil - not every day     PREVENTIVE:   topamax - brain fog, depression     LIFESTYLE  Sleep   - averages: 6-7 hrs  Problems falling asleep?:   sometimes  Problems staying asleep?:  No     Water: 2-5 bottles daily  Caffeine: 3-4 half caf cups in the morning     Mood:   Endorses anxiety, denies depression    Past Medical History:   Diagnosis Date    Asthma     Disease of thyroid gland     Hypertension     Migraine headache     Tobacco use        Past Surgical History:   Procedure Laterality Date    DILATION AND CURETTAGE OF UTERUS      several    ENDOMETRIAL ABLATION      LAPAROSCOPY      Abdominal    TUBAL LIGATION  1997    WISDOM TOOTH EXTRACTION  1985       Current Outpatient Medications   Medication Sig Dispense Refill    amitriptyline (ELAVIL) 10 mg tablet Take 2 tablets (20 mg total) by mouth daily at bedtime Can increase by 1 tablet every week as needed for migraine relief up to a maximum of 50 mg at night 270 tablet 3    amLODIPine (NORVASC) 2.5 mg tablet Take 1 tablet (2.5 mg total) by mouth daily 90 tablet 1    fluticasone (FLONASE) 50 mcg/act nasal spray 2 sprays into each nostril daily 16 mL 2    lisinopril (ZESTRIL) 40 mg tablet Take 1 tablet (40 mg total) by mouth daily 90 tablet 1    rizatriptan (Maxalt) 10 mg tablet Take 1 tablet (10 mg total) by mouth as needed for migraine Take at the onset of migraine; if symptoms continue or return, may take another dose at least 2 hours after first dose. Take no more than 2 doses  in a day. 9 tablet 3    tretinoin (RETIN-A) 0.025 % cream Apply 1 application topically as needed         No current facility-administered medications for this visit.        Allergies   Allergen Reactions    Nickel     Other      Hickory  Dust mites        Review of Systems   Constitutional:  Negative for appetite change, fatigue and fever.   HENT: Negative.  Negative for hearing loss, tinnitus, trouble swallowing and voice change.    Eyes:  Positive for photophobia (Sometimes) and pain (Sometimes). Negative for visual disturbance.   Respiratory: Negative.  Negative for shortness of breath.    Cardiovascular: Negative.  Negative for palpitations.   Gastrointestinal: Negative.  Negative for nausea and vomiting.   Endocrine: Negative.  Negative for cold intolerance.   Genitourinary: Negative.  Negative for dysuria, frequency and urgency.   Musculoskeletal:  Positive for neck stiffness (Sometimes). Negative for back pain, gait problem, myalgias and neck pain.   Skin: Negative.  Negative for rash.   Allergic/Immunologic: Negative.    Neurological:  Positive for dizziness (Sometimes), weakness (Sometimes), light-headedness (Sometimes) and numbness (Sometimes). Negative for tremors, seizures, syncope, facial asymmetry, speech difficulty and headaches.   Hematological:  Bruises/bleeds easily (Bruises easily).   Psychiatric/Behavioral: Negative.  Negative for confusion, hallucinations and sleep disturbance.        Video Exam    There were no vitals filed for this visit.    Physical Exam  Neurological:      Mental Status: She is alert and oriented to person, place, and time.      Cranial Nerves: No dysarthria or facial asymmetry.      Sensory: No sensory deficit.      Motor: No weakness or tremor.       I have spent a total time of 24 minutes on 02/22/24 in caring for this patient including Risks and benefits of tx options, Instructions for management, Patient and family education, Risk factor reductions, Impressions,  Documenting in the medical record, Reviewing / ordering tests, medicine, procedures  , and Obtaining or reviewing history  .

## 2024-03-04 ENCOUNTER — OFFICE VISIT (OUTPATIENT)
Dept: INTERNAL MEDICINE CLINIC | Facility: OTHER | Age: 59
End: 2024-03-04
Payer: COMMERCIAL

## 2024-03-04 VITALS
HEIGHT: 63 IN | DIASTOLIC BLOOD PRESSURE: 82 MMHG | OXYGEN SATURATION: 97 % | SYSTOLIC BLOOD PRESSURE: 124 MMHG | WEIGHT: 150 LBS | TEMPERATURE: 98 F | HEART RATE: 69 BPM | BODY MASS INDEX: 26.58 KG/M2

## 2024-03-04 DIAGNOSIS — Q04.8 CEREBELLAR TONSILLAR ECTOPIA (HCC): ICD-10-CM

## 2024-03-04 DIAGNOSIS — F17.200 TOBACCO USE DISORDER: ICD-10-CM

## 2024-03-04 DIAGNOSIS — I10 ESSENTIAL HYPERTENSION: ICD-10-CM

## 2024-03-04 DIAGNOSIS — J01.10 ACUTE NON-RECURRENT FRONTAL SINUSITIS: Primary | ICD-10-CM

## 2024-03-04 DIAGNOSIS — J30.1 NON-SEASONAL ALLERGIC RHINITIS DUE TO POLLEN: ICD-10-CM

## 2024-03-04 PROCEDURE — 99213 OFFICE O/P EST LOW 20 MIN: CPT

## 2024-03-04 RX ORDER — AMOXICILLIN AND CLAVULANATE POTASSIUM 875; 125 MG/1; MG/1
1 TABLET, FILM COATED ORAL EVERY 12 HOURS SCHEDULED
Qty: 20 TABLET | Refills: 0 | Status: SHIPPED | OUTPATIENT
Start: 2024-03-04 | End: 2024-03-14

## 2024-03-04 RX ORDER — FLUTICASONE PROPIONATE 50 MCG
2 SPRAY, SUSPENSION (ML) NASAL DAILY
Qty: 16 ML | Refills: 2 | Status: SHIPPED | OUTPATIENT
Start: 2024-03-04 | End: 2029-06-09

## 2024-03-04 RX ORDER — LEVOTHYROXINE SODIUM 88 UG/1
TABLET ORAL
COMMUNITY
Start: 2023-12-17 | End: 2024-03-12 | Stop reason: SDUPTHER

## 2024-03-04 NOTE — PROGRESS NOTES
Assessment/Plan:    1. Acute non-recurrent frontal sinusitis  Assessment & Plan:  URI symptoms x 2 weeks  Likely superimposed bacterial infection  Will treat with Augmentin twice daily x 10 days-take with food, take all antibiotic course  Advised Flonase twice daily, second-generation antihistamine such as Zyrtec daily, decongestant daily  Advised increased rest, fluid intake  Discussed symptomatic care including salt water gargles for sore throat, steam showers for congestion, warm liquids   Patient can take Tylenol/ibuprofen for fevers and body aches  Discussed red flag symptoms including going to the ER with chest pain or shortness of breath  Discussed course of illness could be 1 to 2 weeks.   Return to the office for reevaluation if symptoms do not improve in 1-2 weeks       Orders:  -     fluticasone (FLONASE) 50 mcg/act nasal spray; 2 sprays into each nostril daily  -     amoxicillin-clavulanate (AUGMENTIN) 875-125 mg per tablet; Take 1 tablet by mouth every 12 (twelve) hours for 10 days    2. Non-seasonal allergic rhinitis due to pollen    3. Cerebellar tonsillar ectopia (HCC)  Assessment & Plan:  Continue following with neurology      4. Essential hypertension  Assessment & Plan:  Blood pressure well-controlled.  Continue current regiment      5. Tobacco use disorder  Assessment & Plan:  Patient quit smoking on January 30, 2024  Congratulated on progress                M*MRO software was used to dictate this note.  It may contain errors with dictating incorrect words or incorrect spelling. Please contact the provider directly with any questions.    Subjective:      Patient ID: Dee Cristina is a 59 y.o. female.     Patient is a 59-year-old female presenting to the office for upper respiratory symptoms x 2 weeks  Endorsing cough, chest congestion, sinus congestion, fatigue  1 episode of shortness of breath 3 days ago.  Denies shortness of breath or wheezing otherwise    Patient notes she did stop smoking  in January 2024  COVID test negative at home on Saturday 3/2    Treatments tried include Flonase and Carlota-Errol cold and sinus    URI   This is a new problem. The current episode started 1 to 4 weeks ago. The problem has been unchanged. There has been no fever. Associated symptoms include congestion, coughing, headaches, a plugged ear sensation and sinus pain. Pertinent negatives include no abdominal pain, chest pain, ear pain, nausea, rhinorrhea, sore throat, vomiting or wheezing.         The following portions of the patient's history were reviewed and updated as appropriate: allergies, current medications, past family history, past medical history, past social history, past surgical history, and problem list.    Review of Systems   Constitutional:  Positive for fatigue. Negative for chills and fever.   HENT:  Positive for congestion, postnasal drip, sinus pressure and sinus pain. Negative for ear pain, rhinorrhea, sore throat and trouble swallowing.    Eyes:  Negative for discharge, redness and itching.   Respiratory:  Positive for cough and chest tightness (4 days). Negative for shortness of breath and wheezing.    Cardiovascular:  Negative for chest pain and palpitations.   Gastrointestinal:  Negative for abdominal pain, nausea and vomiting.   Neurological:  Positive for headaches. Negative for dizziness, weakness and light-headedness.         Past Medical History:   Diagnosis Date    Asthma     Disease of thyroid gland     Hypertension     Migraine headache     Tobacco use          Current Outpatient Medications:     amitriptyline (ELAVIL) 10 mg tablet, Take 1 tablet (10 mg total) by mouth daily at bedtime Can increase by 1 tablet every week as needed for migraine relief up to a maximum of 50 mg at night, Disp: 270 tablet, Rfl: 3    amLODIPine (NORVASC) 2.5 mg tablet, Take 1 tablet (2.5 mg total) by mouth daily, Disp: 90 tablet, Rfl: 1    amoxicillin-clavulanate (AUGMENTIN) 875-125 mg per tablet, Take 1  "tablet by mouth every 12 (twelve) hours for 10 days, Disp: 20 tablet, Rfl: 0    fluticasone (FLONASE) 50 mcg/act nasal spray, 2 sprays into each nostril daily, Disp: 16 mL, Rfl: 2    levothyroxine 88 mcg tablet, TAKE ONE TABLET BY MOUTH EVERY DAY IN THE MORNING ON AN EMPTY STOMACH 45 MINUTES PRIOR TO TAKING FOOD OR OTHER MEDICATIONS, Disp: , Rfl:     lisinopril (ZESTRIL) 40 mg tablet, Take 1 tablet (40 mg total) by mouth daily, Disp: 90 tablet, Rfl: 1    rizatriptan (Maxalt) 10 mg tablet, Take 1 tablet (10 mg total) by mouth as needed for migraine Take at the onset of migraine; if symptoms continue or return, may take another dose at least 2 hours after first dose. Take no more than 2 doses in a day., Disp: 9 tablet, Rfl: 3    tretinoin (RETIN-A) 0.025 % cream, Apply 1 application topically as needed  , Disp: , Rfl:     Allergies   Allergen Reactions    Nickel     Other      Hickory  Dust mites        Social History   Past Surgical History:   Procedure Laterality Date    DILATION AND CURETTAGE OF UTERUS      several    ENDOMETRIAL ABLATION      LAPAROSCOPY      Abdominal    TUBAL LIGATION  1997    WISDOM TOOTH EXTRACTION  1985     Family History   Problem Relation Age of Onset    Diabetes Mother     Hypertension Mother     Atrial fibrillation Mother     Kidney cancer Mother     Stroke Mother         occipital stroke 11/2022    Thyroid disease Mother     Stroke Father     Heart attack Father     Stroke Maternal Grandmother     Diabetes Maternal Grandmother     Stroke Paternal Grandmother     Diabetes Paternal Grandmother     Thyroid disease Maternal Aunt        Objective:  /82 (BP Location: Left arm, Patient Position: Sitting, Cuff Size: Adult)   Pulse 69   Temp 98 °F (36.7 °C)   Ht 5' 3\" (1.6 m)   Wt 68 kg (150 lb)   SpO2 97%   BMI 26.57 kg/m²      Physical Exam  Vitals and nursing note reviewed.   Constitutional:       General: She is not in acute distress.     Appearance: Normal appearance. She is not " ill-appearing.   HENT:      Head: Normocephalic and atraumatic.      Right Ear: Ear canal and external ear normal. No tenderness. A middle ear effusion is present.      Left Ear: Ear canal and external ear normal. No tenderness. A middle ear effusion is present.      Nose: Congestion present.      Mouth/Throat:      Mouth: Mucous membranes are moist.      Pharynx: Oropharynx is clear. Uvula midline. Posterior oropharyngeal erythema present. No pharyngeal swelling or oropharyngeal exudate.      Tonsils: No tonsillar exudate.   Eyes:      General:         Right eye: No discharge.         Left eye: No discharge.      Extraocular Movements: Extraocular movements intact.      Conjunctiva/sclera: Conjunctivae normal.      Pupils: Pupils are equal, round, and reactive to light.   Cardiovascular:      Rate and Rhythm: Normal rate and regular rhythm.      Pulses: Normal pulses.      Heart sounds: Normal heart sounds. No murmur heard.     No friction rub. No gallop.   Pulmonary:      Effort: Pulmonary effort is normal. No respiratory distress.      Breath sounds: Normal breath sounds. No stridor. No wheezing, rhonchi or rales.   Chest:      Chest wall: No tenderness.   Musculoskeletal:      Cervical back: Normal range of motion.   Lymphadenopathy:      Cervical: No cervical adenopathy.   Skin:     General: Skin is warm and dry.      Coloration: Skin is not pale.      Findings: No erythema.   Neurological:      General: No focal deficit present.      Mental Status: She is alert and oriented to person, place, and time. Mental status is at baseline.      Cranial Nerves: No cranial nerve deficit.      Coordination: Coordination normal.   Psychiatric:         Mood and Affect: Mood normal.         Behavior: Behavior normal.           Disclaimer: This note was generated with voice recognition software.  Phonetic, grammatical, and spelling errors may be present as a result.  Please contact provider with any concerns or questions

## 2024-03-04 NOTE — ASSESSMENT & PLAN NOTE
URI symptoms x 2 weeks  Likely superimposed bacterial infection  Will treat with Augmentin twice daily x 10 days-take with food, take all antibiotic course  Advised Flonase twice daily, second-generation antihistamine such as Zyrtec daily, decongestant daily  Advised increased rest, fluid intake  Discussed symptomatic care including salt water gargles for sore throat, steam showers for congestion, warm liquids   Patient can take Tylenol/ibuprofen for fevers and body aches  Discussed red flag symptoms including going to the ER with chest pain or shortness of breath  Discussed course of illness could be 1 to 2 weeks.   Return to the office for reevaluation if symptoms do not improve in 1-2 weeks

## 2024-03-04 NOTE — PATIENT INSTRUCTIONS
Flonase twice daily, second-generation antihistamine such as Zyrtec daily, decongestant daily  Increased rest, fluid intake  Symptomatic care including salt water gargles for sore throat, steam showers for congestion, warm liquids   Patient can take Tylenol/ibuprofen for fevers and body aches  Red flag symptoms including going to the ER with chest pain or shortness of breath  Return to the office for reevaluation if symptoms do not improve in 1-2 weeks

## 2024-03-12 DIAGNOSIS — E06.3 HYPOTHYROIDISM DUE TO HASHIMOTO'S THYROIDITIS: Primary | ICD-10-CM

## 2024-03-12 DIAGNOSIS — E03.8 HYPOTHYROIDISM DUE TO HASHIMOTO'S THYROIDITIS: Primary | ICD-10-CM

## 2024-03-15 RX ORDER — LEVOTHYROXINE SODIUM 88 UG/1
88 TABLET ORAL EVERY MORNING
Qty: 90 TABLET | Refills: 1 | Status: SHIPPED | OUTPATIENT
Start: 2024-03-15

## 2024-04-11 ENCOUNTER — OFFICE VISIT (OUTPATIENT)
Dept: INTERNAL MEDICINE CLINIC | Facility: OTHER | Age: 59
End: 2024-04-11

## 2024-04-11 VITALS
WEIGHT: 152.8 LBS | OXYGEN SATURATION: 98 % | SYSTOLIC BLOOD PRESSURE: 126 MMHG | HEIGHT: 63 IN | HEART RATE: 77 BPM | TEMPERATURE: 98.5 F | BODY MASS INDEX: 27.07 KG/M2 | DIASTOLIC BLOOD PRESSURE: 82 MMHG

## 2024-04-11 DIAGNOSIS — J30.1 SEASONAL ALLERGIC RHINITIS DUE TO POLLEN: Primary | ICD-10-CM

## 2024-04-11 RX ORDER — CLINDAMYCIN PHOSPHATE 10 MG/G
GEL TOPICAL 2 TIMES DAILY
COMMUNITY
Start: 2024-03-07

## 2024-04-11 RX ORDER — MONTELUKAST SODIUM 10 MG/1
10 TABLET ORAL
Qty: 30 TABLET | Refills: 5 | Status: SHIPPED | OUTPATIENT
Start: 2024-04-11

## 2024-04-11 NOTE — ASSESSMENT & PLAN NOTE
Will defer on antibiotics at this time. Recommended she continue Flonase daily, Claritin, and will start Singulair. Recommended she re-establish care with ENT.

## 2024-04-11 NOTE — PROGRESS NOTES
Assessment/Plan:    Seasonal allergic rhinitis due to pollen  Will defer on antibiotics at this time. Recommended she continue Flonase daily, Claritin, and will start Singulair. Recommended she re-establish care with ENT.        Diagnoses and all orders for this visit:    Seasonal allergic rhinitis due to pollen  -     montelukast (SINGULAIR) 10 mg tablet; Take 1 tablet (10 mg total) by mouth daily at bedtime    Other orders  -     clindamycin 1 % gel; Apply topically 2 (two) times a day                  Subjective:      Patient ID: Dee Cristina is a 59 y.o. female.    Chief Complaint   Patient presents with   • Cold Like Symptoms     She c/o pain below right ear and the right side of her neck and her ears are plugged.  She continues to have sinus congestion and eye floaters.  She would like a refill of Diflucan for yeast infection caused by antibiotics.       Dee Cristina is seen today with concern for persistent URI symptoms.  She was seen a month ago and was treated with Augmentin for 10 days which she has completed.   Her current symptoms consist of fluid in both ears, right sided neck lymphadenopathy, and sinus congestion.   She denies any cough, sore throat, post nasal drip.         The following portions of the patient's history were reviewed and updated as appropriate: allergies, current medications, past family history, past medical history, past social history, past surgical history, and problem list.    Review of Systems   Constitutional:  Negative for activity change, appetite change, chills, diaphoresis, fatigue and fever.   HENT:  Positive for sinus pressure. Negative for congestion, postnasal drip, rhinorrhea, sinus pain, sneezing and sore throat.    Eyes:  Negative for visual disturbance.   Respiratory:  Negative for apnea, cough, choking, chest tightness, shortness of breath and wheezing.    Cardiovascular:  Negative for chest pain, palpitations and leg swelling.   Gastrointestinal:  Negative  for abdominal distention, abdominal pain, anal bleeding, blood in stool, constipation, diarrhea, nausea and vomiting.   Endocrine: Negative for cold intolerance and heat intolerance.   Genitourinary:  Negative for difficulty urinating, dysuria and hematuria.   Musculoskeletal: Negative.    Skin: Negative.    Neurological:  Negative for dizziness, weakness, light-headedness, numbness and headaches.   Hematological:  Negative for adenopathy.   Psychiatric/Behavioral:  Negative for agitation, sleep disturbance and suicidal ideas.    All other systems reviewed and are negative.        Past Medical History:   Diagnosis Date   • Asthma    • Disease of thyroid gland    • Hypertension    • Migraine headache    • Tobacco use          Current Outpatient Medications:   •  amitriptyline (ELAVIL) 10 mg tablet, Take 1 tablet (10 mg total) by mouth daily at bedtime Can increase by 1 tablet every week as needed for migraine relief up to a maximum of 50 mg at night, Disp: 270 tablet, Rfl: 3  •  amLODIPine (NORVASC) 2.5 mg tablet, Take 1 tablet (2.5 mg total) by mouth daily, Disp: 90 tablet, Rfl: 1  •  clindamycin 1 % gel, Apply topically 2 (two) times a day, Disp: , Rfl:   •  fluticasone (FLONASE) 50 mcg/act nasal spray, 2 sprays into each nostril daily, Disp: 16 mL, Rfl: 2  •  levothyroxine 88 mcg tablet, Take 1 tablet (88 mcg total) by mouth every morning On empty stomach 45 mins prior to breakfast, Disp: 90 tablet, Rfl: 1  •  lisinopril (ZESTRIL) 40 mg tablet, Take 1 tablet (40 mg total) by mouth daily, Disp: 90 tablet, Rfl: 1  •  montelukast (SINGULAIR) 10 mg tablet, Take 1 tablet (10 mg total) by mouth daily at bedtime, Disp: 30 tablet, Rfl: 5  •  rizatriptan (Maxalt) 10 mg tablet, Take 1 tablet (10 mg total) by mouth as needed for migraine Take at the onset of migraine; if symptoms continue or return, may take another dose at least 2 hours after first dose. Take no more than 2 doses in a day., Disp: 9 tablet, Rfl: 3  •   "tretinoin (RETIN-A) 0.025 % cream, Apply 1 application topically as needed  , Disp: , Rfl:     Allergies   Allergen Reactions   • Nickel    • Other      [place]  Dust mites        Social History   Past Surgical History:   Procedure Laterality Date   • DILATION AND CURETTAGE OF UTERUS      several   • ENDOMETRIAL ABLATION     • LAPAROSCOPY      Abdominal   • TUBAL LIGATION  1997   • WISDOM TOOTH EXTRACTION  1985     Family History   Problem Relation Age of Onset   • Diabetes Mother    • Hypertension Mother    • Atrial fibrillation Mother    • Kidney cancer Mother    • Stroke Mother         occipital stroke 11/2022   • Thyroid disease Mother    • Stroke Father    • Heart attack Father    • Stroke Maternal Grandmother    • Diabetes Maternal Grandmother    • Stroke Paternal Grandmother    • Diabetes Paternal Grandmother    • Thyroid disease Maternal Aunt        Objective:  /82 (BP Location: Left arm, Patient Position: Sitting, Cuff Size: Standard)   Pulse 77   Temp 98.5 °F (36.9 °C) (Temporal)   Ht 5' 3\" (1.6 m)   Wt 69.3 kg (152 lb 12.8 oz)   SpO2 98%   BMI 27.07 kg/m²     No results found for this or any previous visit (from the past 1344 hour(s)).         Physical Exam  Vitals and nursing note reviewed.   Constitutional:       General: She is not in acute distress.     Appearance: She is well-developed. She is not diaphoretic.   HENT:      Head: Normocephalic and atraumatic.      Right Ear: A middle ear effusion is present.      Left Ear: A middle ear effusion is present.   Eyes:      General:         Right eye: No discharge.         Left eye: No discharge.      Conjunctiva/sclera: Conjunctivae normal.      Pupils: Pupils are equal, round, and reactive to light.   Neck:      Thyroid: No thyromegaly.      Vascular: No JVD.   Cardiovascular:      Rate and Rhythm: Normal rate and regular rhythm.      Heart sounds: Normal heart sounds. No murmur heard.     No friction rub. No gallop.   Pulmonary:      Effort: " Pulmonary effort is normal. No respiratory distress.      Breath sounds: Normal breath sounds. No wheezing or rales.   Chest:      Chest wall: No tenderness.   Abdominal:      General: There is no distension.      Palpations: Abdomen is soft.      Tenderness: There is no abdominal tenderness.   Musculoskeletal:         General: No tenderness or deformity. Normal range of motion.      Cervical back: Normal range of motion and neck supple.   Lymphadenopathy:      Head:      Right side of head: Submandibular adenopathy present.      Cervical: No cervical adenopathy.   Skin:     General: Skin is warm and dry.      Coloration: Skin is not pale.      Findings: No erythema or rash.   Neurological:      Mental Status: She is alert and oriented to person, place, and time.      Cranial Nerves: No cranial nerve deficit.      Coordination: Coordination normal.   Psychiatric:         Behavior: Behavior normal.         Thought Content: Thought content normal.         Judgment: Judgment normal.

## 2024-05-01 PROBLEM — J01.10 ACUTE NON-RECURRENT FRONTAL SINUSITIS: Status: RESOLVED | Noted: 2022-04-19 | Resolved: 2024-05-01

## 2024-05-08 ENCOUNTER — OFFICE VISIT (OUTPATIENT)
Dept: INTERNAL MEDICINE CLINIC | Facility: OTHER | Age: 59
End: 2024-05-08
Payer: COMMERCIAL

## 2024-05-08 VITALS
OXYGEN SATURATION: 98 % | TEMPERATURE: 98.2 F | HEIGHT: 63 IN | HEART RATE: 74 BPM | WEIGHT: 152 LBS | BODY MASS INDEX: 26.93 KG/M2 | SYSTOLIC BLOOD PRESSURE: 110 MMHG | DIASTOLIC BLOOD PRESSURE: 70 MMHG

## 2024-05-08 DIAGNOSIS — I10 ESSENTIAL HYPERTENSION: ICD-10-CM

## 2024-05-08 DIAGNOSIS — E06.3 HYPOTHYROIDISM DUE TO HASHIMOTO'S THYROIDITIS: ICD-10-CM

## 2024-05-08 DIAGNOSIS — F41.9 ANXIETY: ICD-10-CM

## 2024-05-08 DIAGNOSIS — E78.49 OTHER HYPERLIPIDEMIA: ICD-10-CM

## 2024-05-08 DIAGNOSIS — G43.009 MIGRAINE WITHOUT AURA AND WITHOUT STATUS MIGRAINOSUS, NOT INTRACTABLE: Primary | ICD-10-CM

## 2024-05-08 DIAGNOSIS — J30.1 SEASONAL ALLERGIC RHINITIS DUE TO POLLEN: ICD-10-CM

## 2024-05-08 DIAGNOSIS — E03.8 HYPOTHYROIDISM DUE TO HASHIMOTO'S THYROIDITIS: ICD-10-CM

## 2024-05-08 DIAGNOSIS — I10 HTN (HYPERTENSION), BENIGN: ICD-10-CM

## 2024-05-08 DIAGNOSIS — S01.332A COMPLICATION OF LEFT EAR PIERCING, INITIAL ENCOUNTER: ICD-10-CM

## 2024-05-08 DIAGNOSIS — R59.0 LYMPHADENOPATHY, CERVICAL: ICD-10-CM

## 2024-05-08 PROCEDURE — 99214 OFFICE O/P EST MOD 30 MIN: CPT | Performed by: INTERNAL MEDICINE

## 2024-05-08 RX ORDER — LISINOPRIL 40 MG/1
40 TABLET ORAL DAILY
Qty: 90 TABLET | Refills: 1 | Status: SHIPPED | OUTPATIENT
Start: 2024-05-08

## 2024-05-08 RX ORDER — LORATADINE 10 MG/1
10 TABLET ORAL DAILY
COMMUNITY

## 2024-05-11 LAB
ALBUMIN SERPL-MCNC: 4.4 G/DL (ref 3.5–5.7)
ALP SERPL-CCNC: 84 U/L (ref 35–120)
ALT SERPL-CCNC: 18 U/L
ANION GAP SERPL CALCULATED.3IONS-SCNC: 10 MMOL/L (ref 3–11)
AST SERPL-CCNC: 15 U/L
BILIRUB SERPL-MCNC: 0.5 MG/DL (ref 0.2–1)
BUN SERPL-MCNC: 25 MG/DL (ref 7–25)
CALCIUM SERPL-MCNC: 9.5 MG/DL (ref 8.5–10.1)
CHLORIDE SERPL-SCNC: 105 MMOL/L (ref 100–109)
CHOLEST SERPL-MCNC: 219 MG/DL
CHOLEST/HDLC SERPL: 3 {RATIO}
CO2 SERPL-SCNC: 23 MMOL/L (ref 21–31)
CREAT SERPL-MCNC: 0.96 MG/DL (ref 0.4–1.1)
CYTOLOGY CMNT CVX/VAG CYTO-IMP: NORMAL
ERYTHROCYTE [DISTWIDTH] IN BLOOD BY AUTOMATED COUNT: 12.6 % (ref 12–16)
GFR/BSA.PRED SERPLBLD CYS-BASED-ARV: 69 ML/MIN/{1.73_M2}
GLUCOSE SERPL-MCNC: 92 MG/DL (ref 65–99)
HCT VFR BLD AUTO: 42 % (ref 35–43)
HDLC SERPL-MCNC: 74 MG/DL (ref 23–92)
HGB BLD-MCNC: 14.5 G/DL (ref 11.5–14.5)
LDLC SERPL CALC-MCNC: 126 MG/DL
MCH RBC QN AUTO: 31.4 PG (ref 26–34)
MCHC RBC AUTO-ENTMCNC: 34.5 G/DL (ref 32–37)
MCV RBC AUTO: 91 FL (ref 80–100)
NONHDLC SERPL-MCNC: 145 MG/DL
PLATELET # BLD AUTO: 373 THOU/CMM (ref 140–350)
PMV BLD REES-ECKER: 7.9 FL (ref 7.5–11.3)
POTASSIUM SERPL-SCNC: 4.7 MMOL/L (ref 3.5–5.2)
PROT SERPL-MCNC: 6.8 G/DL (ref 6.3–8.3)
RBC # BLD AUTO: 4.62 MILL/CMM (ref 3.7–4.7)
SODIUM SERPL-SCNC: 138 MMOL/L (ref 135–145)
TRIGL SERPL-MCNC: 93 MG/DL
TSH SERPL-ACNC: 1.25 UIU/ML (ref 0.45–5.33)
WBC # BLD AUTO: 6.8 THOU/CMM (ref 4–10)

## 2024-05-12 LAB
A ALTERNATA IGE QN: <0.1 KU/L
A FUMIGATUS IGE QN: <0.1 KU/L
ALLERGENS TESTED: NORMAL
BERMUDA GRASS IGE QN: <0.1 KU/L
BOXELDER IGE QN: <0.1 KU/L
C HERBARUM IGE QN: <0.1 KU/L
CALIF WALNUT POLN IGE QN: 0.13 KU/L
CAT DANDER IGE QN: <0.1 KU/L
COCKSFOOT IGE QN: <0.1 KU/L
COMMON RAGWEED IGE QN: <0.1 KU/L
D FARINAE IGE QN: <0.1 KU/L
D PTERONYSS IGE QN: <0.1 KU/L
DOG DANDER IGE QN: <0.1 KU/L
ENGL PLANTAIN IGE QN: <0.1 KU/L
GOOSEFOOT IGE QN: <0.1 KU/L
HOUSE DUST HS IGE QN: <0.1 KU/L
KENT BLUE GRASS IGE QN: <0.1 KU/L
MOUSE URINE PROT IGE QN: <0.1 KIU/ML
P NOTATUM IGE QN: <0.1 KU/L
PER RYE GRASS IGE QN: <0.1 KU/L
REF LAB TEST REF RANGE: NORMAL
ROACH IGE QN: <0.1 KU/L
S ROSTRATA IGE QN: <0.1 KU/L
SALTWORT IGE QN: <0.1 KU/L
SILVER BIRCH IGE QN: <0.1 KU/L
TIMOTHY IGE QN: <0.1 KU/L
WHITE ELM IGE QN: <0.1 KU/L
WHITE OAK IGE QN: <0.1 KU/L

## 2024-05-21 ENCOUNTER — HOSPITAL ENCOUNTER (OUTPATIENT)
Dept: ULTRASOUND IMAGING | Facility: HOSPITAL | Age: 59
Discharge: HOME | End: 2024-05-21
Attending: INTERNAL MEDICINE
Payer: COMMERCIAL

## 2024-05-21 DIAGNOSIS — R59.0 LYMPHADENOPATHY, CERVICAL: ICD-10-CM

## 2024-05-21 PROCEDURE — 76536 US EXAM OF HEAD AND NECK: CPT

## 2024-05-28 ENCOUNTER — TELEPHONE (OUTPATIENT)
Age: 59
End: 2024-05-28

## 2024-05-30 NOTE — TELEPHONE ENCOUNTER
Please contact patient to inform her that I reviewed the results of her ultrasound study to which is normal, no abnormalities were appreciated.

## 2024-05-31 ENCOUNTER — TELEPHONE (OUTPATIENT)
Dept: INTERNAL MEDICINE CLINIC | Facility: OTHER | Age: 59
End: 2024-05-31

## 2024-07-06 DIAGNOSIS — I10 ESSENTIAL HYPERTENSION: ICD-10-CM

## 2024-07-07 RX ORDER — AMLODIPINE BESYLATE 2.5 MG/1
2.5 TABLET ORAL DAILY
Qty: 90 TABLET | Refills: 1 | Status: SHIPPED | OUTPATIENT
Start: 2024-07-07

## 2024-07-14 DIAGNOSIS — E03.8 HYPOTHYROIDISM DUE TO HASHIMOTO'S THYROIDITIS: ICD-10-CM

## 2024-07-14 DIAGNOSIS — E06.3 HYPOTHYROIDISM DUE TO HASHIMOTO'S THYROIDITIS: ICD-10-CM

## 2024-07-14 RX ORDER — LEVOTHYROXINE SODIUM 88 UG/1
TABLET ORAL
Qty: 100 TABLET | Refills: 1 | Status: SHIPPED | OUTPATIENT
Start: 2024-07-14

## 2024-08-14 ENCOUNTER — OFFICE VISIT (OUTPATIENT)
Dept: INTERNAL MEDICINE CLINIC | Facility: OTHER | Age: 59
End: 2024-08-14
Payer: COMMERCIAL

## 2024-08-14 VITALS
RESPIRATION RATE: 18 BRPM | BODY MASS INDEX: 26.29 KG/M2 | DIASTOLIC BLOOD PRESSURE: 68 MMHG | WEIGHT: 148.4 LBS | OXYGEN SATURATION: 98 % | SYSTOLIC BLOOD PRESSURE: 104 MMHG | HEIGHT: 63 IN | TEMPERATURE: 98.4 F | HEART RATE: 83 BPM

## 2024-08-14 DIAGNOSIS — I10 ESSENTIAL HYPERTENSION: Primary | ICD-10-CM

## 2024-08-14 DIAGNOSIS — F41.9 ANXIETY: ICD-10-CM

## 2024-08-14 DIAGNOSIS — E78.49 OTHER HYPERLIPIDEMIA: ICD-10-CM

## 2024-08-14 DIAGNOSIS — E06.3 HYPOTHYROIDISM DUE TO HASHIMOTO'S THYROIDITIS: ICD-10-CM

## 2024-08-14 DIAGNOSIS — Z13.820 ENCOUNTER FOR OSTEOPOROSIS SCREENING IN ASYMPTOMATIC POSTMENOPAUSAL PATIENT: ICD-10-CM

## 2024-08-14 DIAGNOSIS — Z81.8 FAMILY HISTORY OF BIPOLAR DISORDER: ICD-10-CM

## 2024-08-14 DIAGNOSIS — Z00.00 ANNUAL PHYSICAL EXAM: ICD-10-CM

## 2024-08-14 DIAGNOSIS — G43.009 MIGRAINE WITHOUT AURA AND WITHOUT STATUS MIGRAINOSUS, NOT INTRACTABLE: ICD-10-CM

## 2024-08-14 DIAGNOSIS — Z78.0 ENCOUNTER FOR OSTEOPOROSIS SCREENING IN ASYMPTOMATIC POSTMENOPAUSAL PATIENT: ICD-10-CM

## 2024-08-14 DIAGNOSIS — F17.211 NICOTINE DEPENDENCE, CIGARETTES, IN REMISSION: ICD-10-CM

## 2024-08-14 DIAGNOSIS — E03.8 HYPOTHYROIDISM DUE TO HASHIMOTO'S THYROIDITIS: ICD-10-CM

## 2024-08-14 DIAGNOSIS — G43.109 MIGRAINE WITH AURA AND WITHOUT STATUS MIGRAINOSUS, NOT INTRACTABLE: ICD-10-CM

## 2024-08-14 DIAGNOSIS — F33.0 MILD EPISODE OF RECURRENT MAJOR DEPRESSIVE DISORDER (HCC): ICD-10-CM

## 2024-08-14 PROCEDURE — 99214 OFFICE O/P EST MOD 30 MIN: CPT | Performed by: INTERNAL MEDICINE

## 2024-08-14 PROCEDURE — 99396 PREV VISIT EST AGE 40-64: CPT | Performed by: INTERNAL MEDICINE

## 2024-08-14 RX ORDER — AMITRIPTYLINE HYDROCHLORIDE 10 MG/1
10 TABLET ORAL
Status: SHIPPED
Start: 2024-08-14

## 2024-08-14 RX ORDER — SERTRALINE HYDROCHLORIDE 25 MG/1
25 TABLET, FILM COATED ORAL DAILY
Qty: 30 TABLET | Refills: 5 | Status: SHIPPED | OUTPATIENT
Start: 2024-08-14 | End: 2025-02-10

## 2024-08-14 NOTE — ASSESSMENT & PLAN NOTE
Discussed continuing diet and exercise.   She is up to date on colorectal cancer screening.   She is up to date on breast cancer screening.   She follows up with her GYN regularly.   She sees her dermatologist regularly for skin cancer screening.

## 2024-08-14 NOTE — ASSESSMENT & PLAN NOTE
Will refer to psychiatry. Will start sertraline 25 mg daily. Discussed potential adverse effects.

## 2024-08-14 NOTE — PROGRESS NOTES
Adult Annual Physical  Name: Dee Cristina      : 1965      MRN: [id number]  Encounter Provider: Saleem Kitchen MD  Encounter Date: 2024   Encounter department: [facility] [place]    Assessment & Plan   1. Essential hypertension  Assessment & Plan:  Controlled, continue current antihypertensive regimen.   Orders:  -     CBC; Future  -     Comprehensive metabolic panel; Future; Expected date: 2025  -     TSH, 3rd generation with Free T4 reflex; Future; Expected date: 2025  -     Lipid panel; Future; Expected date: 2025  -     CBC  -     Comprehensive metabolic panel  -     TSH, 3rd generation with Free T4 reflex  -     Lipid panel  2. Nicotine dependence, cigarettes, in remission  -     CT lung screening program; Future; Expected date: 2024  3. Encounter for osteoporosis screening in asymptomatic postmenopausal patient  4. Annual physical exam  Assessment & Plan:  Discussed continuing diet and exercise.   She is up to date on colorectal cancer screening.   She is up to date on breast cancer screening.   She follows up with her GYN regularly.   She sees her dermatologist regularly for skin cancer screening.   5. Migraine without aura and without status migrainosus, not intractable  Assessment & Plan:  Continue amitriptyline and rizatriptan regimen. Continue follow up with neurology.    6. Hypothyroidism due to Hashimoto's thyroiditis  Assessment & Plan:  Continue levothyroxine 88 mcg daily. Trend TSH.   Orders:  -     CBC; Future  -     Comprehensive metabolic panel; Future; Expected date: 2025  -     TSH, 3rd generation with Free T4 reflex; Future; Expected date: 2025  -     Lipid panel; Future; Expected date: 2025  -     CBC  -     Comprehensive metabolic panel  -     TSH, 3rd generation with Free T4 reflex  -     Lipid panel  7. Anxiety  Assessment & Plan:  Will refer to psychiatry. Will start sertraline 25 mg daily. Discussed  potential adverse effects.   Orders:  -     Ambulatory referral to Psych Services; Future  -     sertraline (ZOLOFT) 25 mg tablet; Take 1 tablet (25 mg total) by mouth daily  8. Family history of bipolar disorder  -     Ambulatory referral to Psych Services; Future  9. Other hyperlipidemia  Assessment & Plan:  Trend lipid panel.   10. Migraine with aura and without status migrainosus, not intractable  -     amitriptyline (ELAVIL) 10 mg tablet; Take 1 tablet (10 mg total) by mouth daily at bedtime Can increase by 1 tablet every week as needed for migraine relief up to a maximum of 50 mg at night  11. Mild episode of recurrent major depressive disorder (HCC)  -     sertraline (ZOLOFT) 25 mg tablet; Take 1 tablet (25 mg total) by mouth daily    Immunizations and preventive care screenings were discussed with patient today. Appropriate education was printed on patient's after visit summary.    Counseling:  Alcohol/drug use: discussed moderation in alcohol intake, the recommendations for healthy alcohol use, and avoidance of illicit drug use.  Dental Health: discussed importance of regular tooth brushing, flossing, and dental visits.  Injury prevention: discussed safety/seat belts, safety helmets, smoke detectors, carbon dioxide detectors, and smoking near bedding or upholstery.  Sexual health: discussed sexually transmitted diseases, partner selection, use of condoms, avoidance of unintended pregnancy, and contraceptive alternatives.  Exercise: the importance of regular exercise/physical activity was discussed. Recommend exercise 3-5 times per week for at least 30 minutes.          History of Present Illness     Adult Annual Physical:  Patient presents for annual physical. Dee Cristina is seen today for follow up of chronic conditions.   Recent laboratory studies reviewed today with the patient.   she has been compliant with her medication regimen.   She has been feeling down and increased her amitriptyline from 10 mg  to 20 mg HS. She has a FH of bipolar d/o. She is under a lot of emotional stress with family issues.   BP is well controlled.   she has no complaints or concerns at this time.   .     Diet and Physical Activity:  - Diet/Nutrition: well balanced diet, consuming 3-5 servings of fruits/vegetables daily and adequate fiber intake.  - Exercise: moderate cardiovascular exercise and 5-7 times a week on average.    Depression Screening:  - PHQ-2 Score: 0    General Health:  - Sleep: 7-8 hours of sleep on average and sleeps well.  - Hearing: normal hearing bilateral ears.  - Vision: no vision problems, wears glasses and goes for regular eye exams.  - Dental: regular dental visits and brushes teeth twice daily.    /GYN Health:  - Follows with GYN: yes.   - Menopause: postmenopausal.   - History of STDs: no    Review of Systems   Constitutional:  Negative for activity change, appetite change, chills, diaphoresis, fatigue and fever.   HENT:  Negative for congestion, postnasal drip, rhinorrhea, sinus pressure, sinus pain, sneezing and sore throat.    Eyes:  Negative for visual disturbance.   Respiratory:  Negative for apnea, cough, choking, chest tightness, shortness of breath and wheezing.    Cardiovascular:  Negative for chest pain, palpitations and leg swelling.   Gastrointestinal:  Negative for abdominal distention, abdominal pain, anal bleeding, blood in stool, constipation, diarrhea, nausea and vomiting.   Endocrine: Negative for cold intolerance and heat intolerance.   Genitourinary:  Negative for difficulty urinating, dysuria and hematuria.   Musculoskeletal: Negative.    Skin: Negative.    Neurological:  Negative for dizziness, weakness, light-headedness, numbness and headaches.   Hematological:  Negative for adenopathy.   Psychiatric/Behavioral:  Negative for agitation, sleep disturbance and suicidal ideas.    All other systems reviewed and are negative.    Medical History Reviewed by provider this encounter:  Tobacco   Allergies  Meds  Problems  Med Hx  Surg Hx  Fam Hx       Current Outpatient Medications on File Prior to Visit   Medication Sig Dispense Refill   • amLODIPine (NORVASC) 2.5 mg tablet TAKE ONE TABLET BY MOUTH EVERY DAY 90 tablet 1   • clindamycin 1 % gel Apply topically if needed     • fluticasone (FLONASE) 50 mcg/act nasal spray 2 sprays into each nostril daily 16 mL 2   • levothyroxine 88 mcg tablet TAKE ONE TABLET BY MOUTH EVERY MORNING ON AN EMTPY STOMACH 45 MINUTES PRIOR TO BREAKFAST 100 tablet 1   • lisinopril (ZESTRIL) 40 mg tablet Take 1 tablet (40 mg total) by mouth daily 90 tablet 1   • rizatriptan (Maxalt) 10 mg tablet Take 1 tablet (10 mg total) by mouth as needed for migraine Take at the onset of migraine; if symptoms continue or return, may take another dose at least 2 hours after first dose. Take no more than 2 doses in a day. 9 tablet 3   • tretinoin (RETIN-A) 0.025 % cream Apply 1 application topically as needed       • [DISCONTINUED] amitriptyline (ELAVIL) 10 mg tablet Take 1 tablet (10 mg total) by mouth daily at bedtime Can increase by 1 tablet every week as needed for migraine relief up to a maximum of 50 mg at night (Patient taking differently: Take 20 mg by mouth daily at bedtime Can increase by 1 tablet every week as needed for migraine relief up to a maximum of 50 mg at night) 270 tablet 3   • [DISCONTINUED] loratadine (CLARITIN) 10 mg tablet Take 10 mg by mouth daily       No current facility-administered medications on file prior to visit.      Social History     Tobacco Use   • Smoking status: Former     Current packs/day: 0.00     Average packs/day: 0.5 packs/day for 42.0 years (21.0 ttl pk-yrs)     Types: Cigarettes     Start date: 1982     Quit date: 2024     Years since quittin.5   • Smokeless tobacco: Never   Vaping Use   • Vaping status: Never Used   Substance and Sexual Activity   • Alcohol use: Yes     Comment: socially   • Drug use: No   • Sexual activity:  "Yes     Partners: Male     Birth control/protection: Female Sterilization       Objective     /68 (BP Location: Left arm, Patient Position: Sitting, Cuff Size: Standard)   Pulse 83   Temp 98.4 °F (36.9 °C) (Temporal)   Resp 18   Ht 5' 3\" (1.6 m)   Wt 67.3 kg (148 lb 6.4 oz)   SpO2 98%   BMI 26.29 kg/m²     Physical Exam  Vitals and nursing note reviewed.   Constitutional:       General: She is not in acute distress.     Appearance: She is well-developed. She is not diaphoretic.   HENT:      Head: Normocephalic and atraumatic.   Eyes:      General:         Right eye: No discharge.         Left eye: No discharge.      Conjunctiva/sclera: Conjunctivae normal.      Pupils: Pupils are equal, round, and reactive to light.   Neck:      Thyroid: No thyromegaly.      Vascular: No JVD.   Cardiovascular:      Rate and Rhythm: Normal rate and regular rhythm.      Heart sounds: Normal heart sounds. No murmur heard.     No friction rub. No gallop.   Pulmonary:      Effort: Pulmonary effort is normal. No respiratory distress.      Breath sounds: Normal breath sounds. No wheezing or rales.   Chest:      Chest wall: No tenderness.   Abdominal:      General: There is no distension.      Palpations: Abdomen is soft.      Tenderness: There is no abdominal tenderness.   Musculoskeletal:         General: No tenderness or deformity. Normal range of motion.      Cervical back: Normal range of motion and neck supple.   Lymphadenopathy:      Cervical: No cervical adenopathy.   Skin:     General: Skin is warm and dry.      Coloration: Skin is not pale.      Findings: No erythema or rash.   Neurological:      Mental Status: She is alert and oriented to person, place, and time.      Cranial Nerves: No cranial nerve deficit.      Coordination: Coordination normal.   Psychiatric:         Behavior: Behavior normal.         Thought Content: Thought content normal.         Judgment: Judgment normal.       Administrative Statements   I " have spent a total time of 30 minutes in caring for this patient on the day of the visit/encounter including Diagnostic results, Prognosis, Risks and benefits of tx options, Instructions for management, Patient and family education, Importance of tx compliance, Risk factor reductions, Impressions, Counseling / Coordination of care, Documenting in the medical record, Reviewing / ordering tests, medicine, procedures  , and Obtaining or reviewing history  .

## 2024-08-15 ENCOUNTER — TELEPHONE (OUTPATIENT)
Dept: ADMINISTRATIVE | Facility: OTHER | Age: 59
End: 2024-08-15

## 2024-08-15 ENCOUNTER — TELEPHONE (OUTPATIENT)
Age: 59
End: 2024-08-15

## 2024-08-15 NOTE — TELEPHONE ENCOUNTER
Contacted Pt. in regards to ROUTINE Referral, Pt. Has been added to [facility] Wait List at this time. Pt open to [facility]      Resources sent to Email: [email or web address]

## 2024-08-15 NOTE — TELEPHONE ENCOUNTER
----- Message from Clara CHEUNG sent at 8/15/2024  7:17 AM EDT -----  Regarding: dexa  08/15/24 7:17 AM    Hello, our patient Dee Cristina has had DEXA Scan completed/performed. Please assist in updating the patient chart by pulling the Care Everywhere (CE) document. The date of service is 5/15/24.     Thank you,  Clara Silva MA  [facility] PR[facility]

## 2024-08-15 NOTE — TELEPHONE ENCOUNTER
Upon review of the In Basket request we were able to locate, review, and update the patient chart as requested for DEXA Scan.    Any additional questions or concerns should be emailed to the Practice Liaisons via the appropriate education email address, please do not reply via In Basket.    Thank you  GORDY HARRIS MA   [facility]

## 2024-08-16 ENCOUNTER — TELEPHONE (OUTPATIENT)
Age: 59
End: 2024-08-16

## 2024-08-16 NOTE — TELEPHONE ENCOUNTER
"Pt went to  [facility] of [facility]oloft [facility]mg and [facility] not get [facility] script for that. Advised pt of \"E-Prescribing Status: Receipt confirmed by pharmacy (8/14/2024  5:16 PM EDT)\"    Pt states she will reach out to [facility] again- please advise for possible resubmission of rx. Thank you!  "

## 2024-08-30 DIAGNOSIS — E06.3 HYPOTHYROIDISM DUE TO HASHIMOTO'S THYROIDITIS: ICD-10-CM

## 2024-08-30 DIAGNOSIS — E03.8 HYPOTHYROIDISM DUE TO HASHIMOTO'S THYROIDITIS: ICD-10-CM

## 2024-08-30 RX ORDER — LEVOTHYROXINE SODIUM 88 UG/1
88 TABLET ORAL DAILY
Qty: 90 TABLET | Refills: 1 | Status: SHIPPED | OUTPATIENT
Start: 2024-08-30

## 2024-09-17 ENCOUNTER — OFFICE VISIT (OUTPATIENT)
Dept: URGENT CARE | Facility: CLINIC | Age: 59
End: 2024-09-17

## 2024-09-17 VITALS
SYSTOLIC BLOOD PRESSURE: 119 MMHG | DIASTOLIC BLOOD PRESSURE: 80 MMHG | TEMPERATURE: 98 F | HEART RATE: 80 BPM | OXYGEN SATURATION: 99 %

## 2024-09-17 DIAGNOSIS — S39.012A STRAIN OF MUSCLE, FASCIA AND TENDON OF LOWER BACK, INITIAL ENCOUNTER: Primary | ICD-10-CM

## 2024-09-17 RX ORDER — CYCLOBENZAPRINE HCL 5 MG
5 TABLET ORAL 3 TIMES DAILY PRN
Qty: 30 TABLET | Refills: 0 | Status: SHIPPED | OUTPATIENT
Start: 2024-09-17 | End: 2024-09-27

## 2024-09-17 NOTE — PROGRESS NOTES
Assessment/Plan    Strain of muscle, fascia and tendon of lower back, initial encounter [S39.012A]  1. Strain of muscle, fascia and tendon of lower back, initial encounter  cyclobenzaprine (FLEXERIL) 5 mg tablet            Subjective:     Patient ID: Dee Cristina is a 59 y.o. female.      Reason For Visit / Chief Complaint  Chief Complaint   Patient presents with    Back Pain         This is a 59-year-old female patient who presents to the wellness center.  Patient is complaining of right-sided lower back pain and muscle strain.  Patient states she just got back from vacation yesterday and this is her first day back to work.  She states that she was hiking in the [place] and had a 5-hour plane ride home.  Patient was feeling very well, but while seated in the car yesterday she turned and she felt her back feel stiff and she has been uncomfortable since that time.  Patient had a difficult time sleeping last night.  Patient took ibuprofen 2 times today with minimal relief.  Patient made an appointment with her chiropractor later today.  Patient denies chest pain or shortness of breath.  Patient denies difficulty urinating or defecating.  Patient denies numbness or tingling.    Back Pain  This is a new problem. The current episode started yesterday. The problem is unchanged. The pain is present in the lumbar spine. The quality of the pain is described as aching. The pain does not radiate. The pain is at a severity of 8/10. The pain is moderate. The pain is The same all the time. The symptoms are aggravated by bending and sitting. Pertinent negatives include no bladder incontinence, bowel incontinence, chest pain, dysuria, fever, headaches, leg pain, numbness, tingling or weakness. She has tried chiropractic manipulation and NSAIDs for the symptoms. The treatment provided mild relief.       Past Medical History:   Diagnosis Date    Asthma     Disease of thyroid gland     Hypertension     Migraine headache      Tobacco use     Tobacco use disorder 10/24/2012       Past Surgical History:   Procedure Laterality Date    DILATION AND CURETTAGE OF UTERUS      several    ENDOMETRIAL ABLATION      LAPAROSCOPY      Abdominal    TUBAL LIGATION  1997    WISDOM TOOTH EXTRACTION  1985       Family History   Problem Relation Age of Onset    Diabetes Mother     Hypertension Mother     Atrial fibrillation Mother     Kidney cancer Mother     Stroke Mother         occipital stroke 11/2022    Thyroid disease Mother     Stroke Father     Heart attack Father     Stroke Maternal Grandmother     Diabetes Maternal Grandmother     Stroke Paternal Grandmother     Diabetes Paternal Grandmother     Thyroid disease Maternal Aunt        Review of Systems   Constitutional:  Negative for chills, fatigue and fever.   Respiratory:  Negative for shortness of breath.    Cardiovascular:  Negative for chest pain.   Gastrointestinal:  Negative for bowel incontinence.   Genitourinary:  Negative for bladder incontinence and dysuria.   Musculoskeletal:  Positive for back pain and myalgias.   Neurological:  Negative for tingling, weakness, numbness and headaches.       Objective:    /80 (BP Location: Left arm, Patient Position: Standing)   Pulse 80   Temp 98 °F (36.7 °C)   SpO2 99%     Physical Exam  Vitals reviewed.   Constitutional:       General: She is not in acute distress.     Appearance: Normal appearance. She is normal weight. She is not ill-appearing.   HENT:      Head: Normocephalic and atraumatic.   Cardiovascular:      Rate and Rhythm: Normal rate.      Pulses: Normal pulses.   Pulmonary:      Effort: Pulmonary effort is normal.   Abdominal:      Palpations: Abdomen is soft.   Musculoskeletal:      Lumbar back: Spasms and tenderness present. No signs of trauma or bony tenderness.        Back:    Skin:     General: Skin is warm and dry.   Neurological:      General: No focal deficit present.      Mental Status: She is alert and oriented to  person, place, and time.      Motor: No weakness.      Gait: Gait normal.   Psychiatric:         Mood and Affect: Mood normal.

## 2024-09-17 NOTE — PATIENT INSTRUCTIONS
We saw you today for a muscle strain of your lower back.  As we discussed, keep your appointment this afternoon with the chiropractor.  Take the muscle relaxant tonight for your muscle strain.  Allow for 8 to 10 hours of sleep.  Use a heating pad tonight.  You may take the muscle relaxant every 8 hours as needed, however, it will cause drowsiness and you should not operate a vehicle while taking.  You may wake up tomorrow morning and feel relief of the muscle strain.  If you do not have relief you may need to take it tomorrow night as well.    We also discussed the difference between a muscle strain and sciatica.  Sciatica may form when there is a pinched nerve and this is usually evidence by pain and tingling going down the back of your leg which you do not have.  Should you develop this at any time, follow-up with your family doctor or return to me and let me know.    Rest.  Drink plenty of fluids.  You may also take ibuprofen in addition to the muscle relaxant.

## 2024-09-22 ENCOUNTER — HOSPITAL ENCOUNTER (EMERGENCY)
Facility: HOSPITAL | Age: 59
Discharge: HOME | End: 2024-09-22
Attending: EMERGENCY MEDICINE
Payer: COMMERCIAL

## 2024-09-22 ENCOUNTER — APPOINTMENT (EMERGENCY)
Dept: CT IMAGING | Facility: HOSPITAL | Age: 59
End: 2024-09-22
Payer: COMMERCIAL

## 2024-09-22 VITALS
RESPIRATION RATE: 16 BRPM | BODY MASS INDEX: 27.1 KG/M2 | DIASTOLIC BLOOD PRESSURE: 82 MMHG | SYSTOLIC BLOOD PRESSURE: 155 MMHG | OXYGEN SATURATION: 98 % | TEMPERATURE: 98.1 F | HEART RATE: 88 BPM | WEIGHT: 153 LBS

## 2024-09-22 DIAGNOSIS — K59.00 CONSTIPATION: Primary | ICD-10-CM

## 2024-09-22 DIAGNOSIS — M54.9 BACK PAIN: ICD-10-CM

## 2024-09-22 LAB
ALBUMIN SERPL BCG-MCNC: 4.6 G/DL (ref 3.5–5)
ALP SERPL-CCNC: 80 U/L (ref 34–104)
ALT SERPL W P-5'-P-CCNC: 17 U/L (ref 7–52)
ANION GAP SERPL CALCULATED.3IONS-SCNC: 8 MMOL/L (ref 4–13)
AST SERPL W P-5'-P-CCNC: 16 U/L (ref 13–39)
BASOPHILS # BLD AUTO: 0.05 THOUSANDS/ΜL (ref 0–0.1)
BASOPHILS NFR BLD AUTO: 1 % (ref 0–1)
BILIRUB DIRECT SERPL-MCNC: 0.05 MG/DL (ref 0–0.2)
BILIRUB SERPL-MCNC: 0.45 MG/DL (ref 0.2–1)
BILIRUB UR QL STRIP: NEGATIVE
BUN SERPL-MCNC: 23 MG/DL (ref 5–25)
CALCIUM SERPL-MCNC: 10 MG/DL (ref 8.4–10.2)
CHLORIDE SERPL-SCNC: 105 MMOL/L (ref 96–108)
CLARITY UR: CLEAR
CO2 SERPL-SCNC: 25 MMOL/L (ref 21–32)
COLOR UR: YELLOW
CREAT SERPL-MCNC: 1.04 MG/DL (ref 0.6–1.3)
EOSINOPHIL # BLD AUTO: 0.06 THOUSAND/ΜL (ref 0–0.61)
EOSINOPHIL NFR BLD AUTO: 1 % (ref 0–6)
ERYTHROCYTE [DISTWIDTH] IN BLOOD BY AUTOMATED COUNT: 12 % (ref 11.6–15.1)
GFR SERPL CREATININE-BSD FRML MDRD: 59 ML/MIN/1.73SQ M
GLUCOSE SERPL-MCNC: 112 MG/DL (ref 65–140)
GLUCOSE UR STRIP-MCNC: NEGATIVE MG/DL
HCT VFR BLD AUTO: 43.1 % (ref 34.8–46.1)
HGB BLD-MCNC: 14.8 G/DL (ref 11.5–15.4)
HGB UR QL STRIP.AUTO: NEGATIVE
IMM GRANULOCYTES # BLD AUTO: 0.02 THOUSAND/UL (ref 0–0.2)
IMM GRANULOCYTES NFR BLD AUTO: 0 % (ref 0–2)
KETONES UR STRIP-MCNC: NEGATIVE MG/DL
LACTATE SERPL-SCNC: 0.6 MMOL/L (ref 0.5–2)
LEUKOCYTE ESTERASE UR QL STRIP: NEGATIVE
LIPASE SERPL-CCNC: 148 U/L (ref 11–82)
LYMPHOCYTES # BLD AUTO: 2.14 THOUSANDS/ΜL (ref 0.6–4.47)
LYMPHOCYTES NFR BLD AUTO: 24 % (ref 14–44)
MCH RBC QN AUTO: 30.6 PG (ref 26.8–34.3)
MCHC RBC AUTO-ENTMCNC: 34.3 G/DL (ref 31.4–37.4)
MCV RBC AUTO: 89 FL (ref 82–98)
MONOCYTES # BLD AUTO: 0.68 THOUSAND/ΜL (ref 0.17–1.22)
MONOCYTES NFR BLD AUTO: 8 % (ref 4–12)
NEUTROPHILS # BLD AUTO: 6.01 THOUSANDS/ΜL (ref 1.85–7.62)
NEUTS SEG NFR BLD AUTO: 66 % (ref 43–75)
NITRITE UR QL STRIP: NEGATIVE
NRBC BLD AUTO-RTO: 0 /100 WBCS
PH UR STRIP.AUTO: 6.5 [PH] (ref 4.5–8)
PLATELET # BLD AUTO: 365 THOUSANDS/UL (ref 149–390)
PMV BLD AUTO: 8.9 FL (ref 8.9–12.7)
POTASSIUM SERPL-SCNC: 4.3 MMOL/L (ref 3.5–5.3)
PROT SERPL-MCNC: 7.1 G/DL (ref 6.4–8.4)
PROT UR STRIP-MCNC: NEGATIVE MG/DL
RBC # BLD AUTO: 4.83 MILLION/UL (ref 3.81–5.12)
SODIUM SERPL-SCNC: 138 MMOL/L (ref 135–147)
SP GR UR STRIP.AUTO: 1.01 (ref 1–1.03)
UROBILINOGEN UR QL STRIP.AUTO: 0.2 E.U./DL
WBC # BLD AUTO: 8.96 THOUSAND/UL (ref 4.31–10.16)

## 2024-09-22 PROCEDURE — 96361 HYDRATE IV INFUSION ADD-ON: CPT

## 2024-09-22 PROCEDURE — 96374 THER/PROPH/DIAG INJ IV PUSH: CPT

## 2024-09-22 PROCEDURE — 83605 ASSAY OF LACTIC ACID: CPT | Performed by: EMERGENCY MEDICINE

## 2024-09-22 PROCEDURE — 83690 ASSAY OF LIPASE: CPT | Performed by: EMERGENCY MEDICINE

## 2024-09-22 PROCEDURE — 80076 HEPATIC FUNCTION PANEL: CPT | Performed by: EMERGENCY MEDICINE

## 2024-09-22 PROCEDURE — 81003 URINALYSIS AUTO W/O SCOPE: CPT

## 2024-09-22 PROCEDURE — 96375 TX/PRO/DX INJ NEW DRUG ADDON: CPT

## 2024-09-22 PROCEDURE — 80048 BASIC METABOLIC PNL TOTAL CA: CPT | Performed by: EMERGENCY MEDICINE

## 2024-09-22 PROCEDURE — 85025 COMPLETE CBC W/AUTO DIFF WBC: CPT | Performed by: EMERGENCY MEDICINE

## 2024-09-22 PROCEDURE — 99284 EMERGENCY DEPT VISIT MOD MDM: CPT

## 2024-09-22 PROCEDURE — 99285 EMERGENCY DEPT VISIT HI MDM: CPT | Performed by: EMERGENCY MEDICINE

## 2024-09-22 PROCEDURE — 74177 CT ABD & PELVIS W/CONTRAST: CPT

## 2024-09-22 PROCEDURE — 36415 COLL VENOUS BLD VENIPUNCTURE: CPT | Performed by: EMERGENCY MEDICINE

## 2024-09-22 RX ORDER — ONDANSETRON 2 MG/ML
4 INJECTION INTRAMUSCULAR; INTRAVENOUS ONCE
Status: COMPLETED | OUTPATIENT
Start: 2024-09-22 | End: 2024-09-22

## 2024-09-22 RX ORDER — POLYETHYLENE GLYCOL 3350 17 G/17G
17 POWDER, FOR SOLUTION ORAL DAILY
Qty: 7 EACH | Refills: 0 | Status: SHIPPED | OUTPATIENT
Start: 2024-09-22

## 2024-09-22 RX ORDER — METHOCARBAMOL 750 MG/1
750 TABLET, FILM COATED ORAL 3 TIMES DAILY PRN
Qty: 20 TABLET | Refills: 0 | Status: SHIPPED | OUTPATIENT
Start: 2024-09-22 | End: 2024-09-22

## 2024-09-22 RX ORDER — KETOROLAC TROMETHAMINE 30 MG/ML
15 INJECTION, SOLUTION INTRAMUSCULAR; INTRAVENOUS ONCE
Status: COMPLETED | OUTPATIENT
Start: 2024-09-22 | End: 2024-09-22

## 2024-09-22 RX ORDER — POLYETHYLENE GLYCOL 3350 17 G/17G
68 POWDER, FOR SOLUTION ORAL ONCE
Status: COMPLETED | OUTPATIENT
Start: 2024-09-22 | End: 2024-09-22

## 2024-09-22 RX ORDER — POLYETHYLENE GLYCOL 3350 17 G/17G
17 POWDER, FOR SOLUTION ORAL DAILY
Qty: 7 EACH | Refills: 0 | Status: SHIPPED | OUTPATIENT
Start: 2024-09-22 | End: 2024-09-22

## 2024-09-22 RX ORDER — METHOCARBAMOL 750 MG/1
750 TABLET, FILM COATED ORAL 3 TIMES DAILY PRN
Qty: 20 TABLET | Refills: 0 | Status: SHIPPED | OUTPATIENT
Start: 2024-09-22

## 2024-09-22 RX ORDER — MELOXICAM 15 MG/1
15 TABLET ORAL DAILY
Qty: 14 TABLET | Refills: 0 | Status: SHIPPED | OUTPATIENT
Start: 2024-09-22

## 2024-09-22 RX ORDER — DOCUSATE SODIUM 100 MG/1
100 CAPSULE, LIQUID FILLED ORAL EVERY 12 HOURS
Qty: 14 CAPSULE | Refills: 0 | Status: SHIPPED | OUTPATIENT
Start: 2024-09-22 | End: 2024-09-29

## 2024-09-22 RX ORDER — HYDROMORPHONE HCL/PF 1 MG/ML
0.5 SYRINGE (ML) INJECTION ONCE
Status: COMPLETED | OUTPATIENT
Start: 2024-09-22 | End: 2024-09-22

## 2024-09-22 RX ORDER — MELOXICAM 15 MG/1
15 TABLET ORAL DAILY
Qty: 14 TABLET | Refills: 0 | Status: SHIPPED | OUTPATIENT
Start: 2024-09-22 | End: 2024-09-22

## 2024-09-22 RX ADMIN — ONDANSETRON 4 MG: 2 INJECTION INTRAMUSCULAR; INTRAVENOUS at 12:12

## 2024-09-22 RX ADMIN — POLYETHYLENE GLYCOL 3350 68 G: 17 POWDER, FOR SOLUTION ORAL at 15:00

## 2024-09-22 RX ADMIN — IOHEXOL 100 ML: 350 INJECTION, SOLUTION INTRAVENOUS at 13:03

## 2024-09-22 RX ADMIN — KETOROLAC TROMETHAMINE 15 MG: 30 INJECTION, SOLUTION INTRAMUSCULAR; INTRAVENOUS at 12:12

## 2024-09-22 RX ADMIN — HYDROMORPHONE HYDROCHLORIDE 0.5 MG: 1 INJECTION, SOLUTION INTRAMUSCULAR; INTRAVENOUS; SUBCUTANEOUS at 12:12

## 2024-09-22 RX ADMIN — SODIUM CHLORIDE 1000 ML: 0.9 INJECTION, SOLUTION INTRAVENOUS at 12:12

## 2024-09-22 NOTE — Clinical Note
Dee Cristina was seen and treated in our emergency department on 9/22/2024.                Diagnosis:     Dee  may return to work on return date.    She may return on this date: 09/25/2024         If you have any questions or concerns, please don't hesitate to call.      Víctor Gaytan MD    ______________________________           _______________          _______________  Hospital Representative                              Date                                Time

## 2024-09-22 NOTE — ED PROVIDER NOTES
1. Constipation    2. Back pain      ED Disposition       ED Disposition   Discharge    Condition   Stable    Date/Time   Sun Sep 22, 2024  4:18 PM    Comment   Dee Cristina discharge to home/self care.                   Assessment & Plan   {Hyperlinks  Risk Stratification - NIHSS - HEART SCORE - Fill out sepsis note and make sure you call [phone] if severe or septic shock:[id number]}    Medical Decision Making  Amount and/or Complexity of Data Reviewed  Labs: ordered.  Radiology: ordered.    Risk  Prescription drug management.                     Medications   sodium chloride 0.9 % bolus 1,000 mL (0 mL Intravenous Stopped 9/22/24 1529)   ketorolac (TORADOL) injection 15 mg (15 mg Intravenous Given 9/22/24 1212)   HYDROmorphone (DILAUDID) injection 0.5 mg (0.5 mg Intravenous Given 9/22/24 1212)   ondansetron (ZOFRAN) injection 4 mg (4 mg Intravenous Given 9/22/24 1212)   iohexol (OMNIPAQUE) 350 MG/ML injection (MULTI-DOSE) 100 mL (100 mL Intravenous Given 9/22/24 1303)   polyethylene glycol (MIRALAX) packet 68 g (68 g Oral Given 9/22/24 1500)       History of Present Illness   {Hyperlinks  History (Med, Surg, Fam, Social) - Current Medications - Allergies  :[id number]}    HPI    Review of Systems        Objective   {Hyperlinks  Historical Vitals - Historical Labs - Chart Review/Microbiology - Last Echo - Code Status  :[id number]}  ED Triage Vitals [09/22/24 1043]   Temperature Pulse Blood Pressure Respirations SpO2 Patient Position - Orthostatic VS   98.1 °F (36.7 °C) 75 (!) 172/91 18 98 % Sitting      Temp Source Heart Rate Source BP Location FiO2 (%) Pain Score    Oral Monitor Right arm -- 10 - Worst Possible Pain        Physical Exam    Labs Reviewed   LIPASE - Abnormal       Result Value    Lipase 148 (*)    HEPATIC FUNCTION PANEL - Normal    Total Bilirubin 0.45      Bilirubin, Direct 0.05      Alkaline Phosphatase 80      AST 16      ALT 17      Total Protein 7.1      Albumin 4.6     LACTIC ACID, PLASMA  (W/REFLEX IF RESULT > 2.0) - Normal    LACTIC ACID 0.6      Narrative:     Result may be elevated if tourniquet was used during collection.   BASIC METABOLIC PANEL    Sodium 138      Potassium 4.3      Chloride 105      CO2 25      ANION GAP 8      BUN 23      Creatinine 1.04      Glucose 112      Calcium 10.0      eGFR 59      Narrative:     National Kid[employer] Disease Foundation guidelines for Chronic Kidney Disease (CKD):     Stage 1 with normal or high GFR (GFR > 90 mL/min/1.73 square meters)    Stage 2 Mild CKD (GFR = 60-89 mL/min/1.73 square meters)    Stage 3A Moderate CKD (GFR = 45-59 mL/min/1.73 square meters)    Stage 3B Moderate CKD (GFR = 30-44 mL/min/1.73 square meters)    Stage 4 Severe CKD (GFR = 15-29 mL/min/1.73 square meters)    Stage 5 End Stage CKD (GFR <15 mL/min/1.73 square meters)  Note: GFR calculation is accurate only with a steady state creatinine   CBC AND DIFFERENTIAL    WBC 8.96      RBC 4.83      Hemoglobin 14.8      Hematocrit 43.1      MCV 89      MCH 30.6      MCHC 34.3      RDW 12.0      MPV 8.9      Platelets 365      nRBC 0      Segmented % 66      Immature Grans % 0      Lymphocytes % 24      Monocytes % 8      Eosinophils Relative 1      Basophils Relative 1      Absolute Neutrophils 6.01      Absolute Immature Grans 0.02      Absolute Lymphocytes 2.14      Absolute Monocytes 0.68      Eosinophils Absolute 0.06      Basophils Absolute 0.05     URINE MACROSCOPIC, POC    Color, UA Yellow      Clarity, UA Clear      pH, UA 6.5      Leukocytes, UA Negative      Nitrite, UA Negative      Protein, UA Negative      Glucose, UA Negative      Ketones, UA Negative      Urobilinogen, UA 0.2      Bilirubin, UA Negative      Occult Blood, UA Negative      Specific Gravity, UA 1.015      Narrative:     CLIN[facility]K RESULT     CT abdomen pelvis with contrast   Final Interpretation by Connor Galeas MD (09/22 1329)      No acute abdominopelvic abnormality.   Large proximal colonic stool burden.       3 and 4 mm pulmonary nodules as described.   Considering smoking history, follow-up chest CT recommended in 12 months.      The study was marked in EPIC for immediate notification.      Workstation performed: [id number]         CT recon only lumbar spine   Final Interpretation by Mars Robbins MD ( 1321)   1. No fracture or traumatic subluxation.   2. Moderate degenerative changes and mild levoscoliosis.            Workstation performed: [id number]             Procedures    ED Medication and Procedure Management   Prior to Admission Medications   Prescriptions Last Dose Informant Patient Reported? Taking?   amLODIPine (NORVASC) 2.5 mg tablet  Self No No   Sig: TAKE ONE TABLET BY MOUTH EVERY DAY   amitriptyline (ELAVIL) 10 mg tablet   No No   Sig: Take 1 tablet (10 mg total) by mouth daily at bedtime Can increase by 1 tablet every week as needed for migraine relief up to a maximum of 50 mg at night   clindamycin 1 % gel  Self Yes No   Sig: Apply topically if needed   cyclobenzaprine (FLEXERIL) 5 mg tablet   No No   Sig: Take 1 tablet (5 mg total) by mouth 3 (three) times a day as needed for muscle spasms for up to 10 days   fluticasone (FLONASE) 50 mcg/act nasal spray  Self No No   Si sprays into each nostril daily   levothyroxine 88 mcg tablet   No No   Sig: Take 1 tablet (88 mcg total) by mouth daily   lisinopril (ZESTRIL) 40 mg tablet  Self No No   Sig: Take 1 tablet (40 mg total) by mouth daily   rizatriptan (Maxalt) 10 mg tablet  Self No No   Sig: Take 1 tablet (10 mg total) by mouth as needed for migraine Take at the onset of migraine; if symptoms continue or return, may take another dose at least 2 hours after first dose. Take no more than 2 doses in a day.   sertraline (ZOLOFT) 25 mg tablet   No No   Sig: Take 1 tablet (25 mg total) by mouth daily   tretinoin (RETIN-A) 0.025 % cream  Self Yes No   Sig: Apply 1 application topically as needed        Facility-Administered Medications:  None     Patient's Medications   Discharge Prescriptions    MELOXICAM (MOBIC) 15 MG TABLET    Take 1 tablet (15 mg total) by mouth daily       Start Date: 9/22/2024 End Date: --       Order Dose: 15 mg       Quantity: 14 tablet    Refills: 0    METHOCARBAMOL (ROBAXIN) 750 MG TABLET    Take 1 tablet (750 mg total) by mouth 3 (three) times a day as needed for muscle spasms       Start Date: 9/22/2024 End Date: --       Order Dose: 750 mg       Quantity: 20 tablet    Refills: 0    POLYETHYLENE GLYCOL (MIRALAX) 17 G PACKET    Take 17 g by mouth daily       Start Date: 9/22/2024 End Date: --       Order Dose: 17 g       Quantity: 7 each    Refills: 0        more than 2 doses in a day.   sertraline (ZOLOFT) 25 mg tablet   No No   Sig: Take 1 tablet (25 mg total) by mouth daily   tretinoin (RETIN-A) 0.025 % cream  Self Yes No   Sig: Apply 1 application topically as needed        Facility-Administered Medications: None     Patient's Medications   Discharge Prescriptions    MELOXICAM (MOBIC) 15 MG TABLET    Take 1 tablet (15 mg total) by mouth daily       Start Date: 9/22/2024 End Date: --       Order Dose: 15 mg       Quantity: 14 tablet    Refills: 0    METHOCARBAMOL (ROBAXIN) 750 MG TABLET    Take 1 tablet (750 mg total) by mouth 3 (three) times a day as needed for muscle spasms       Start Date: 9/22/2024 End Date: --       Order Dose: 750 mg       Quantity: 20 tablet    Refills: 0    POLYETHYLENE GLYCOL (MIRALAX) 17 G PACKET    Take 17 g by mouth daily       Start Date: 9/22/2024 End Date: --       Order Dose: 17 g       Quantity: 7 each    Refills: 0          Víctor Gaytan MD  10/08/24 1520

## 2024-09-23 ENCOUNTER — TELEPHONE (OUTPATIENT)
Age: 59
End: 2024-09-23

## 2024-09-23 DIAGNOSIS — K59.01 SLOW TRANSIT CONSTIPATION: Primary | ICD-10-CM

## 2024-09-23 NOTE — TELEPHONE ENCOUNTER
Patient calling with concerns of constipation. Pt was seen yesterday in ED was given an enema in hospital and was able to have BM. Pt was discharged home with Rx. Colace and Rx: Miralax which she took this morning and since she had Enema yesterday has not has any BM. Pt inquiring if does have BM by end of day should she return to Ed. Pt states prior being seen in ED has did not have BM since Tuesday.      Please review and advise.  Thank you

## 2024-09-24 ENCOUNTER — TELEPHONE (OUTPATIENT)
Dept: PHYSICAL THERAPY | Facility: OTHER | Age: 59
End: 2024-09-24

## 2024-09-24 NOTE — TELEPHONE ENCOUNTER
Call placed to the patient per [facility] referral.    Spoke with the Pt. She is not interested in PT at this time as she is being treated by a chiro and seeing benefits.     Phone number to [facility] provided in case she would like to call back     [facility] closed

## 2024-09-25 RX ORDER — SENNOSIDES 8.6 MG
8.6 TABLET ORAL
Qty: 30 TABLET | Refills: 0 | Status: SHIPPED | OUTPATIENT
Start: 2024-09-25

## 2024-09-25 NOTE — TELEPHONE ENCOUNTER
Spoke with patient stated she did call her GI Dr. Bah on Tuesday and was provided remedy that has helped patient. Currently has no GI symptoms and or constipation at this time   Thak you

## 2024-10-10 ENCOUNTER — HOSPITAL ENCOUNTER (OUTPATIENT)
Dept: RADIOLOGY | Facility: IMAGING CENTER | Age: 59
End: 2024-10-10
Payer: COMMERCIAL

## 2024-10-10 DIAGNOSIS — F17.211 NICOTINE DEPENDENCE, CIGARETTES, IN REMISSION: ICD-10-CM

## 2024-10-10 PROCEDURE — 71271 CT THORAX LUNG CANCER SCR C-: CPT

## 2024-11-07 DIAGNOSIS — I10 HTN (HYPERTENSION), BENIGN: ICD-10-CM

## 2024-11-08 RX ORDER — LISINOPRIL 40 MG/1
40 TABLET ORAL DAILY
Qty: 90 TABLET | Refills: 1 | Status: SHIPPED | OUTPATIENT
Start: 2024-11-08

## 2025-01-06 ENCOUNTER — OFFICE VISIT (OUTPATIENT)
Dept: NEPHROLOGY | Facility: CLINIC | Age: 60
End: 2025-01-06
Payer: COMMERCIAL

## 2025-01-06 VITALS
DIASTOLIC BLOOD PRESSURE: 64 MMHG | HEIGHT: 63 IN | SYSTOLIC BLOOD PRESSURE: 116 MMHG | HEART RATE: 70 BPM | BODY MASS INDEX: 27.46 KG/M2 | WEIGHT: 155 LBS

## 2025-01-06 DIAGNOSIS — N28.9 RENAL INSUFFICIENCY: Primary | ICD-10-CM

## 2025-01-06 PROCEDURE — 99203 OFFICE O/P NEW LOW 30 MIN: CPT | Performed by: INTERNAL MEDICINE

## 2025-01-06 NOTE — PROGRESS NOTES
Consultation - Nephrology   Dee Cristina 59 y.o. female MRN: [id number]  Unit/Bed#:  Encounter: [id number]      Assessment & Plan     Assessment / Plan:    1.  Renal insufficiency    The patient was referred over at recommendation of the emergency room physician.  The patient has a creatinine of 1 and generally runs around there her urinalysis has no blood in it has an inactive urine sediment with no protein as well.  I explained to the patient that she has normal kidney function no evidence at all of kidney disease.  She was taking Advil regularly prior to going in the emergency room as her creatinine may have gone up from 0.9 to 1 but again these are normal values.    She was referred really because the EGFR was less than 60 and the ER doctor felt she should be referred.  I explained to her that that is a calculated number that is not accurate in reflecting kidney function and its based on age and the blood test.  When there are subtle changes in her creatinine we will see changes in the EGFR.    Again for her I explained that she should just look at the creatinine with respect to her blood test the EGFR is not a good measurement of kidney function especially in a patient like this and she should not worry and I again explained in detail what the EGFR was.    I did explain to her that her primary physician was monitoring this and there was nothing inappropriate with watching her kidney function as everything was being done appropriately and things were normal.    At this point there is no active renal issue patient has normal renal function so I told her she can follow-up as needed and would let her family doctor know that she had seen me.    .      History of Present Illness   Physician Requesting Consult: No att. providers found  Reason for Consult / Principal Problem: Insufficiency  Hx and PE limited by:   HPI: eDe Cristina is a 59 y.o. year old female who presents for her first visit.  The patient was  having severe bouts of constipation and had back pain and had gone for close to a week with respect to a bowel movement and went to the emergency room.  She was taking ibuprofen 3 times a day for days prior to going.  On her assessment evaluation based on the EGFR it sounds like the ER doctor recommended seeing a kidney doctor.  She is here really with no complaints feels great and her constipation has improved  History obtained from chart review and the patient    Consults    Review of Systems   Constitutional:  Negative for appetite change, chills and diaphoresis.   HENT: Negative.     Eyes: Negative.    Respiratory:  Negative for cough, chest tightness and shortness of breath.    Cardiovascular:  Negative for chest pain, palpitations and leg swelling.   Gastrointestinal:  Negative for abdominal pain, constipation, diarrhea, nausea and vomiting.   Genitourinary:  Negative for difficulty urinating, dysuria, flank pain and hematuria.   Neurological:  Negative for dizziness and headaches.   Psychiatric/Behavioral:  Negative for agitation, behavioral problems, confusion and decreased concentration.        Historical Information   Patient Active Problem List   Diagnosis    Endometriosis    Hypothyroidism    Postmenopausal    Essential hypertension    Palpitations    Seasonal allergic rhinitis due to pollen    Anxiety    Annual physical exam    Migraine without aura and without status migrainosus, not intractable    Other hyperlipidemia    Cerebellar tonsillar ectopia (HCC)    Family history of bipolar disorder    Mild episode of recurrent major depressive disorder (HCC)    Renal insufficiency     Past Medical History:   Diagnosis Date    Asthma     Disease of thyroid gland     Hypertension     Migraine headache     Tobacco use     Tobacco use disorder 10/24/2012   No diabetes, stroke, kidney stone, kidney infection, bowel disease, lung disease, liver disease.  Past Surgical History:   Procedure Laterality Date     "DILATION AND CURETTAGE OF UTERUS      several    ENDOMETRIAL ABLATION      LAPAROSCOPY      Abdominal    TUBAL LIGATION      WISDOM TOOTH EXTRACTION  1985     Social History   Social History     Substance and Sexual Activity   Alcohol Use Yes    Comment: socially   Positive tobacco but quit recently.  No ethanol or drug abuse.  Social History     Substance and Sexual Activity   Drug Use No     Social History     Tobacco Use   Smoking Status Former    Current packs/day: 0.00    Average packs/day: 0.5 packs/day for 42.0 years (21.0 ttl pk-yrs)    Types: Cigarettes    Start date: 1982    Quit date: 2024    Years since quittin.9   Smokeless Tobacco Never     Family History   Problem Relation Age of Onset    Diabetes Mother     Hypertension Mother     Atrial fibrillation Mother     Kidney cancer Mother     Stroke Mother         occipital stroke 2022    Thyroid disease Mother     Stroke Father     Heart attack Father     Stroke Maternal Grandmother     Diabetes Maternal Grandmother     Stroke Paternal Grandmother     Diabetes Paternal Grandmother     Thyroid disease Maternal Aunt      Her mother had kidney disease but no history of dialysis.    Meds/Allergies   current meds: No current facility-administered medications for this visit.  Allergies   Allergen Reactions    Nickel     Other      [place]  Dust mites        Objective   [unfilled]  Body mass index is 27.46 kg/m².    Invasive Devices:        PHYSICAL EXAM:  /64 (BP Location: Left arm, Patient Position: Sitting, Cuff Size: Standard)   Pulse 70   Ht 5' 3\" (1.6 m)   Wt 70.3 kg (155 lb)   BMI 27.46 kg/m²     Physical Exam  Constitutional:       General: She is not in acute distress.     Appearance: Normal appearance. She is not toxic-appearing.   HENT:      Head: Normocephalic and atraumatic.      Nose: Nose normal.      Mouth/Throat:      Mouth: Mucous membranes are moist.   Eyes:      General: No scleral icterus.     Extraocular " "Movements: Extraocular movements intact.   Cardiovascular:      Rate and Rhythm: Normal rate and regular rhythm.      Heart sounds:      No gallop.      Comments: No edema.  Pulmonary:      Effort: Pulmonary effort is normal. No respiratory distress.      Breath sounds: No wheezing or rales.   Abdominal:      General: Bowel sounds are normal. There is no distension.      Palpations: Abdomen is soft.      Tenderness: There is no abdominal tenderness.   Neurological:      Mental Status: She is alert and oriented to person, place, and time. Mental status is at baseline.   Psychiatric:         Mood and Affect: Mood normal.         Behavior: Behavior normal.           Current Weight: Weight - Scale: 70.3 kg (155 lb)  First Weight: Weight - Scale: 70.3 kg (155 lb)    Lab Results:              Invalid input(s): \"LABGLOM\"        Invalid input(s): \"LABALBU\"          "

## 2025-01-06 NOTE — PATIENT INSTRUCTIONS
You are here for an initial appointment to evaluate your kidney function because when he went into the emergency room a few months ago due to constipation and the doctor there felt you should see me due to some of the blood work.    Your kidney function is normal.  The creatinine is the blood test for the kidney function 1 is normal and you have been around 1 most of your life lower it sometimes a little higher.  Ibuprofen can affect the kidney function on a transient basis and you said you are taking it before you went into the emergency room.    I am not saying that you can never take ibuprofen I am just saying that could have been why it was a little elevated but again it was 1 so it was around normal.    There is no protein in the urine and that is important because people left kidney disease have a lot of protein in the urine so even if your blood test is normal if there is protein in your urine that is concerning so it is good that you do not have that.    The reason they sent you was this calculated number called an EGFR.  As I explained that something that is a calculated number not a true measurement and it is not even accurate honestly.  This nephrologist is not a fan of this test at all.    It is a calculated number that takes into account the age of a person and the blood test because as I explained you will lose some function as we get older.  Then if it is less than 60 they say you have stage III kidney disease and that prompted the doctor to say you probably should see me.    Staging people's based on that calculated number so every human being can be staged it does not mean you have kidney disease and as I told you you do not have kidney disease in my opinion.    I would recommend that you not even look at the EGFR and now that you are aware of kidney blood work you could just look at the creatinine and if that staying around 1 you know that your normal.    Of course if you have any questions or  concerns please contact me through [employer] or give the office a call but I will let the doctor know just to continue watching it with blood pressure control.

## 2025-01-06 NOTE — LETTER
January 6, 2025     Saleem Kitchen MD  [address]  [address]  [facility] [address]    Patient: Dee Cristina   YOB: 1965   Date of Visit: 1/6/2025       Dear Dr. Kitchen:    Thank you for referring Dee Cristina to me for evaluation. Below are my notes for this consultation.    If you have questions, please do not hesitate to call me. I look forward to following your patient along with you.         Sincerely,        Leodan Williamson MD        CC: No Recipients    Leodan Williamson MD  1/6/2025  1:57 PM  Sign when Signing Visit  Consultation - Nephrology   Dee Cristina 59 y.o. female MRN: [id number]  Unit/Bed#:  Encounter: [id number]      Assessment & Plan    Assessment / Plan:    1.  Renal insufficiency    The patient was referred over at recommendation of the emergency room physician.  The patient has a creatinine of 1 and generally runs around there her urinalysis has no blood in it has an inactive urine sediment with no protein as well.  I explained to the patient that she has normal kidney function no evidence at all of kidney disease.  She was taking Advil regularly prior to going in the emergency room as her creatinine may have gone up from 0.9 to 1 but again these are normal values.    She was referred really because the EGFR was less than 60 and the ER doctor felt she should be referred.  I explained to her that that is a calculated number that is not accurate in reflecting kidney function and its based on age and the blood test.  When there are subtle changes in her creatinine we will see changes in the EGFR.    Again for her I explained that she should just look at the creatinine with respect to her blood test the EGFR is not a good measurement of kidney function especially in a patient like this and she should not worry and I again explained in detail what the EGFR was.    I did explain to her that her primary physician was monitoring this and there was nothing inappropriate with watching  her kidney function as everything was being done appropriately and things were normal.    At this point there is no active renal issue patient has normal renal function so I told her she can follow-up as needed and would let her family doctor know that she had seen me.    .      History of Present Illness  Physician Requesting Consult: No att. providers found  Reason for Consult / Principal Problem: Insufficiency  Hx and PE limited by:   HPI: Dee Cristina is a 59 y.o. year old female who presents for her first visit.  The patient was having severe bouts of constipation and had back pain and had gone for close to a week with respect to a bowel movement and went to the emergency room.  She was taking ibuprofen 3 times a day for days prior to going.  On her assessment evaluation based on the EGFR it sounds like the ER doctor recommended seeing a kidney doctor.  She is here really with no complaints feels great and her constipation has improved  History obtained from chart review and the patient    Consults    Review of Systems   Constitutional:  Negative for appetite change, chills and diaphoresis.   HENT: Negative.     Eyes: Negative.    Respiratory:  Negative for cough, chest tightness and shortness of breath.    Cardiovascular:  Negative for chest pain, palpitations and leg swelling.   Gastrointestinal:  Negative for abdominal pain, constipation, diarrhea, nausea and vomiting.   Genitourinary:  Negative for difficulty urinating, dysuria, flank pain and hematuria.   Neurological:  Negative for dizziness and headaches.   Psychiatric/Behavioral:  Negative for agitation, behavioral problems, confusion and decreased concentration.        Historical Information  Patient Active Problem List   Diagnosis   • Endometriosis   • Hypothyroidism   • Postmenopausal   • Essential hypertension   • Palpitations   • Seasonal allergic rhinitis due to pollen   • Anxiety   • Annual physical exam   • Migraine without aura and without  status migrainosus, not intractable   • Other hyperlipidemia   • Cerebellar tonsillar ectopia (HCC)   • Family history of bipolar disorder   • Mild episode of recurrent major depressive disorder (HCC)   • Renal insufficiency     Past Medical History:   Diagnosis Date   • Asthma    • Disease of thyroid gland    • Hypertension    • Migraine headache    • Tobacco use    • Tobacco use disorder 10/24/2012   No diabetes, stroke, kidney stone, kidney infection, bowel disease, lung disease, liver disease.  Past Surgical History:   Procedure Laterality Date   • DILATION AND CURETTAGE OF UTERUS      several   • ENDOMETRIAL ABLATION     • LAPAROSCOPY      Abdominal   • TUBAL LIGATION     • WISDOM TOOTH EXTRACTION       Social History  Social History     Substance and Sexual Activity   Alcohol Use Yes    Comment: socially   Positive tobacco but quit recently.  No ethanol or drug abuse.  Social History     Substance and Sexual Activity   Drug Use No     Social History     Tobacco Use   Smoking Status Former   • Current packs/day: 0.00   • Average packs/day: 0.5 packs/day for 42.0 years (21.0 ttl pk-yrs)   • Types: Cigarettes   • Start date: 1982   • Quit date: 2024   • Years since quittin.9   Smokeless Tobacco Never     Family History   Problem Relation Age of Onset   • Diabetes Mother    • Hypertension Mother    • Atrial fibrillation Mother    • Kidney cancer Mother    • Stroke Mother         occipital stroke 2022   • Thyroid disease Mother    • Stroke Father    • Heart attack Father    • Stroke Maternal Grandmother    • Diabetes Maternal Grandmother    • Stroke Paternal Grandmother    • Diabetes Paternal Grandmother    • Thyroid disease Maternal Aunt      Her mother had kidney disease but no history of dialysis.    Meds/Allergies  current meds: No current facility-administered medications for this visit.  Allergies   Allergen Reactions   • Nickel    • Other      Hickory  Dust mites   "      Objective  [unfilled]  Body mass index is 27.46 kg/m².    Invasive Devices:        PHYSICAL EXAM:  /64 (BP Location: Left arm, Patient Position: Sitting, Cuff Size: Standard)   Pulse 70   Ht 5' 3\" (1.6 m)   Wt 70.3 kg (155 lb)   BMI 27.46 kg/m²     Physical Exam  Constitutional:       General: She is not in acute distress.     Appearance: Normal appearance. She is not toxic-appearing.   HENT:      Head: Normocephalic and atraumatic.      Nose: Nose normal.      Mouth/Throat:      Mouth: Mucous membranes are moist.   Eyes:      General: No scleral icterus.     Extraocular Movements: Extraocular movements intact.   Cardiovascular:      Rate and Rhythm: Normal rate and regular rhythm.      Heart sounds:      No gallop.      Comments: No edema.  Pulmonary:      Effort: Pulmonary effort is normal. No respiratory distress.      Breath sounds: No wheezing or rales.   Abdominal:      General: Bowel sounds are normal. There is no distension.      Palpations: Abdomen is soft.      Tenderness: There is no abdominal tenderness.   Neurological:      Mental Status: She is alert and oriented to person, place, and time. Mental status is at baseline.   Psychiatric:         Mood and Affect: Mood normal.         Behavior: Behavior normal.           Current Weight: Weight - Scale: 70.3 kg (155 lb)  First Weight: Weight - Scale: 70.3 kg (155 lb)    Lab Results:              Invalid input(s): \"LABGLOM\"        Invalid input(s): \"LABALBU\"          "

## 2025-01-08 DIAGNOSIS — I10 ESSENTIAL HYPERTENSION: ICD-10-CM

## 2025-01-09 RX ORDER — AMLODIPINE BESYLATE 2.5 MG/1
2.5 TABLET ORAL DAILY
Qty: 90 TABLET | Refills: 1 | Status: SHIPPED | OUTPATIENT
Start: 2025-01-09

## 2025-01-29 ENCOUNTER — TELEPHONE (OUTPATIENT)
Age: 60
End: 2025-01-29

## 2025-01-29 NOTE — TELEPHONE ENCOUNTER
Contacted pt off [facility] wait list in regards to verify needs of service and offer an appt. Pt no longer interested in services. Pt removed from wait list.

## 2025-02-27 ENCOUNTER — TELEMEDICINE (OUTPATIENT)
Dept: NEUROLOGY | Facility: CLINIC | Age: 60
End: 2025-02-27
Payer: COMMERCIAL

## 2025-02-27 VITALS — WEIGHT: 150 LBS | BODY MASS INDEX: 26.58 KG/M2 | HEIGHT: 63 IN

## 2025-02-27 DIAGNOSIS — G43.109 MIGRAINE WITH AURA AND WITHOUT STATUS MIGRAINOSUS, NOT INTRACTABLE: Primary | ICD-10-CM

## 2025-02-27 PROCEDURE — 99214 OFFICE O/P EST MOD 30 MIN: CPT | Performed by: STUDENT IN AN ORGANIZED HEALTH CARE EDUCATION/TRAINING PROGRAM

## 2025-02-27 RX ORDER — AMITRIPTYLINE HYDROCHLORIDE 10 MG/1
10 TABLET ORAL
Qty: 90 TABLET | Refills: 3 | Status: SHIPPED | OUTPATIENT
Start: 2025-02-27

## 2025-02-27 NOTE — PROGRESS NOTES
Virtual Regular Visit    Verification of patient location:    Patient is located at Home in the following state in which I hold an active license [place]      Assessment/Plan:  Migraines with visual/vestibular aura   Preventative:  - we discussed headache hygiene and lifestyle factors that may improve headaches, including sleep hygiene, proper hydration, limiting caffeine intake  - continue amitriptyline 10 mg qHS     Abortive:  - discussed not taking over-the-counter or prescription pain medications more than 3 days per week to prevent medication overuse/rebound headache  - maxalt 10 mg PRN at the onset of her migraines    Encounter provider Kirstin Gao MD    Provider located at [facility] [place]  [address]  [place] [address]      Recent Visits  No visits were found meeting these conditions.  Showing recent visits within past 7 days and meeting all other requirements  Today's Visits  Date Type Provider Dept   02/27/25 Telemedicine Kirstin Gao MD  [facility]   Showing today's visits and meeting all other requirements  Future Appointments  No visits were found meeting these conditions.  Showing future appointments within next 150 days and meeting all other requirements       The patient was identified by name and date of birth. Dee Cristina was informed that this is a telemedicine visit and that the visit is being conducted through the [employer] platform. She agrees to proceed..  My office door was closed. No one else was in the room.  She acknowledged consent and understanding of privacy and security of the video platform. The patient has agreed to participate and understands they can discontinue the visit at any time.    Patient is aware this is a billable service.     Subjective  Dee Cristina is a 60 y.o. female past medical history that includes HTN, hypothyroidism who presents for f/u of headaches.      Interval Hx  She  reports minimal migraines since she has been maintained on amitriptyline. She also states that she has had improvement in her hand paresthesias when using the steering wheel (?CTS) since starting the medication. Sadly, her mother passed away in November of last year and she has been spending the last several months getting her affairs in order. She has no additional complaints at this time.     Last visit  Continues to have symptoms of head pressure at times accompanied by dizziness and photophobia previously concerning for possible migraines. She has been maintained on amitriptyline 10 mg qhs. She states that since then she has noticed an overall mild improvement in her symptoms since starting the medication, but she again has difficulty quantifying her total migraine days. She states that her current symptoms are better described as sinus pressure. She has not used maxalt at all since her last visit.      Previous Hx  Had sinus trouble for years. Pain above the eyes, tightness in the sides of her head. Often feels like she has water in her ears.      Mother had occipital stroke recently and she became more concerned     Has dizziness associated with the pressure. Dizziness always associated with the head pressures.      ent - saw several years ago     Feels subjective weakness in her arms at times. Feels like this is occurring when she is having the head pressure. Not associated with exertion.      Sees floaters in her vision associated with her headaches  Makes her feel tired/weak. Sometimes fireworks in vision     Headaches started at what age? 39  How often do the headaches occur?   - as of 3/20/2023: 6 out of 8 days  What time of the day do the headaches start?  No particular time of day   How long do the headaches last?   Are you ever headache free? Yes     Aura? with aura      Where is your headache located and pain quality? Periorbital  What is the intensity of pain?   Associated symptoms:   [] Nausea        [] Vomiting        [] Diarrhea  [] Stiff or sore neck   [] Problems with concentration  [x] Photophobia     []Phonophobia      [] Osmophobia  [] Blurred vision   [] Prefer quiet, dark room  [x] Light-headed or dizzy      [] Tinnitus   [x] Hands or feet tingle or feel numb/paresthesias       [] Ptosis      [] Facial droop  [] Lacrimation  [x] Nasal congestion/rhinorrhea          Things that make the headache worse? No specific movements     Headache triggers: none identifiable      Have you seen someone else for headaches or pain? No  Have you had trigger point injection performed and how often? No  Have you had Botox injection performed and how often? No   Have you had epidural injections or transforaminal injections performed? No  Have you ever had any Brain imaging? Yes     What medications do you take or have you taken for your headaches?   ABORTIVE:    OTC medications have been ineffective   advil - not every day     PREVENTIVE:   topamax - brain fog, depression     LIFESTYLE  Sleep   - averages: 6-7 hrs  Problems falling asleep?:   sometimes  Problems staying asleep?:  No     Water: 2-5 bottles daily  Caffeine: 3-4 half caf cups in the morning     Mood:   Endorses anxiety, denies depression    Past Medical History:   Diagnosis Date    Asthma     Disease of thyroid gland     Hypertension     Migraine headache     Tobacco use     Tobacco use disorder 10/24/2012       Past Surgical History:   Procedure Laterality Date    DILATION AND CURETTAGE OF UTERUS      several    ENDOMETRIAL ABLATION      LAPAROSCOPY      Abdominal    TUBAL LIGATION  1997    WISDOM TOOTH EXTRACTION  1985       Current Outpatient Medications   Medication Sig Dispense Refill    amitriptyline (ELAVIL) 10 mg tablet Take 1 tablet (10 mg total) by mouth daily at bedtime 90 tablet 3    amLODIPine (NORVASC) 2.5 mg tablet Take 1 tablet (2.5 mg total) by mouth daily 90 tablet 1    clindamycin 1 % gel Apply topically if needed      fluticasone  (FLONASE) 50 mcg/act nasal spray 2 sprays into each nostril daily 16 mL 2    levothyroxine 88 mcg tablet Take 1 tablet (88 mcg total) by mouth daily 90 tablet 1    lisinopril (ZESTRIL) 40 mg tablet Take 1 tablet (40 mg total) by mouth daily 90 tablet 1    tretinoin (RETIN-A) 0.025 % cream Apply 1 application topically as needed        cyclobenzaprine (FLEXERIL) 5 mg tablet Take 1 tablet (5 mg total) by mouth 3 (three) times a day as needed for muscle spasms for up to 10 days (Patient not taking: Reported on 1/6/2025) 30 tablet 0    docusate sodium (COLACE) 100 mg capsule Take 1 capsule (100 mg total) by mouth every 12 (twelve) hours for 7 days (Patient not taking: Reported on 1/6/2025) 14 capsule 0    meloxicam (Mobic) 15 mg tablet Take 1 tablet (15 mg total) by mouth daily (Patient not taking: Reported on 1/6/2025) 14 tablet 0    methocarbamol (ROBAXIN) 750 mg tablet Take 1 tablet (750 mg total) by mouth 3 (three) times a day as needed for muscle spasms (Patient not taking: Reported on 1/6/2025) 20 tablet 0    polyethylene glycol (MIRALAX) 17 g packet Take 17 g by mouth daily (Patient not taking: Reported on 1/6/2025) 7 each 0    rizatriptan (Maxalt) 10 mg tablet Take 1 tablet (10 mg total) by mouth as needed for migraine Take at the onset of migraine; if symptoms continue or return, may take another dose at least 2 hours after first dose. Take no more than 2 doses in a day. (Patient not taking: Reported on 1/6/2025) 9 tablet 3    senna (SENOKOT) 8.6 mg Take 1 tablet (8.6 mg total) by mouth daily at bedtime as needed for constipation (Patient not taking: Reported on 1/6/2025) 30 tablet 0    sertraline (ZOLOFT) 25 mg tablet Take 1 tablet (25 mg total) by mouth daily (Patient not taking: Reported on 1/6/2025) 30 tablet 5     No current facility-administered medications for this visit.        Allergies   Allergen Reactions    Nickel     Other      Hickory  Dust mites        Review of Systems   Constitutional:   "Positive for fatigue. Negative for appetite change and fever.   HENT: Negative.  Negative for hearing loss, tinnitus, trouble swallowing and voice change.    Eyes: Negative.  Negative for photophobia, pain and visual disturbance.   Respiratory: Negative.  Negative for shortness of breath.    Cardiovascular: Negative.  Negative for palpitations.   Gastrointestinal: Negative.  Negative for nausea and vomiting.   Endocrine: Negative.  Negative for cold intolerance.   Genitourinary: Negative.  Negative for dysuria, frequency and urgency.   Musculoskeletal:  Positive for myalgias. Negative for back pain, gait problem, neck pain and neck stiffness.   Skin: Negative.  Negative for rash.   Allergic/Immunologic: Negative.    Neurological:  Positive for dizziness (Sometimes), weakness (Hands) and light-headedness (Sometimes). Negative for tremors, seizures, syncope, facial asymmetry, speech difficulty, numbness and headaches.   Hematological:  Bruises/bleeds easily (Bruises easily).   Psychiatric/Behavioral:  Positive for sleep disturbance (Sometimes falling asleep). Negative for confusion and hallucinations.        Video Exam    Vitals:    02/27/25 0811   Weight: 68 kg (150 lb)   Height: 5' 3\" (1.6 m)       Physical Exam  Neurological:      Mental Status: She is alert and oriented to person, place, and time.      Cranial Nerves: No dysarthria or facial asymmetry.      Sensory: No sensory deficit.      Motor: No weakness or tremor.       I have spent a total time of 30 minutes on 02/27/25 in caring for this patient including Risks and benefits of tx options, Instructions for management, Patient and family education, Risk factor reductions, Impressions, Documenting in the medical record, Reviewing / ordering tests, medicine, procedures  , and Obtaining or reviewing history  .  "

## 2025-03-11 ENCOUNTER — APPOINTMENT (OUTPATIENT)
Dept: URGENT CARE | Facility: CLINIC | Age: 60
End: 2025-03-11

## 2025-03-11 DIAGNOSIS — E03.9 ACQUIRED HYPOTHYROIDISM: ICD-10-CM

## 2025-03-11 DIAGNOSIS — E78.49 OTHER HYPERLIPIDEMIA: ICD-10-CM

## 2025-03-11 DIAGNOSIS — I10 ESSENTIAL HYPERTENSION: Primary | ICD-10-CM

## 2025-03-11 PROCEDURE — 84443 ASSAY THYROID STIM HORMONE: CPT

## 2025-03-11 PROCEDURE — 80061 LIPID PANEL: CPT

## 2025-03-11 PROCEDURE — 80053 COMPREHEN METABOLIC PANEL: CPT

## 2025-03-11 PROCEDURE — 85025 COMPLETE CBC W/AUTO DIFF WBC: CPT

## 2025-03-12 LAB
ALBUMIN SERPL BCG-MCNC: 4.4 G/DL (ref 3.5–5)
ALP SERPL-CCNC: 94 U/L (ref 34–104)
ALT SERPL W P-5'-P-CCNC: 22 U/L (ref 7–52)
ANION GAP SERPL CALCULATED.3IONS-SCNC: 8 MMOL/L (ref 4–13)
AST SERPL W P-5'-P-CCNC: 20 U/L (ref 13–39)
BASOPHILS # BLD AUTO: 0.04 THOUSANDS/ÂΜL (ref 0–0.1)
BASOPHILS NFR BLD AUTO: 1 % (ref 0–1)
BILIRUB SERPL-MCNC: 0.44 MG/DL (ref 0.2–1)
BUN SERPL-MCNC: 16 MG/DL (ref 5–25)
CALCIUM SERPL-MCNC: 9.2 MG/DL (ref 8.4–10.2)
CHLORIDE SERPL-SCNC: 103 MMOL/L (ref 96–108)
CHOLEST SERPL-MCNC: 213 MG/DL (ref ?–200)
CO2 SERPL-SCNC: 25 MMOL/L (ref 21–32)
CREAT SERPL-MCNC: 0.94 MG/DL (ref 0.6–1.3)
EOSINOPHIL # BLD AUTO: 0.08 THOUSAND/ÂΜL (ref 0–0.61)
EOSINOPHIL NFR BLD AUTO: 1 % (ref 0–6)
ERYTHROCYTE [DISTWIDTH] IN BLOOD BY AUTOMATED COUNT: 12 % (ref 11.6–15.1)
GFR SERPL CREATININE-BSD FRML MDRD: 67 ML/MIN/1.73SQ M
GLUCOSE P FAST SERPL-MCNC: 110 MG/DL (ref 65–99)
HCT VFR BLD AUTO: 43.2 % (ref 34.8–46.1)
HDLC SERPL-MCNC: 78 MG/DL
HGB BLD-MCNC: 14.5 G/DL (ref 11.5–15.4)
IMM GRANULOCYTES # BLD AUTO: 0.01 THOUSAND/UL (ref 0–0.2)
IMM GRANULOCYTES NFR BLD AUTO: 0 % (ref 0–2)
LDLC SERPL CALC-MCNC: 123 MG/DL (ref 0–100)
LYMPHOCYTES # BLD AUTO: 1.29 THOUSANDS/ÂΜL (ref 0.6–4.47)
LYMPHOCYTES NFR BLD AUTO: 22 % (ref 14–44)
MCH RBC QN AUTO: 30.3 PG (ref 26.8–34.3)
MCHC RBC AUTO-ENTMCNC: 33.6 G/DL (ref 31.4–37.4)
MCV RBC AUTO: 90 FL (ref 82–98)
MONOCYTES # BLD AUTO: 0.49 THOUSAND/ÂΜL (ref 0.17–1.22)
MONOCYTES NFR BLD AUTO: 8 % (ref 4–12)
NEUTROPHILS # BLD AUTO: 4.1 THOUSANDS/ÂΜL (ref 1.85–7.62)
NEUTS SEG NFR BLD AUTO: 68 % (ref 43–75)
NONHDLC SERPL-MCNC: 135 MG/DL
NRBC BLD AUTO-RTO: 0 /100 WBCS
PLATELET # BLD AUTO: 380 THOUSANDS/UL (ref 149–390)
PMV BLD AUTO: 9.6 FL (ref 8.9–12.7)
POTASSIUM SERPL-SCNC: 4.3 MMOL/L (ref 3.5–5.3)
PROT SERPL-MCNC: 6.9 G/DL (ref 6.4–8.4)
RBC # BLD AUTO: 4.79 MILLION/UL (ref 3.81–5.12)
SODIUM SERPL-SCNC: 136 MMOL/L (ref 135–147)
TRIGL SERPL-MCNC: 60 MG/DL (ref ?–150)
TSH SERPL DL<=0.05 MIU/L-ACNC: 0.63 UIU/ML (ref 0.45–4.5)
WBC # BLD AUTO: 6.01 THOUSAND/UL (ref 4.31–10.16)

## 2025-03-13 DIAGNOSIS — E06.3 HYPOTHYROIDISM DUE TO HASHIMOTO'S THYROIDITIS: ICD-10-CM

## 2025-03-13 DIAGNOSIS — J01.10 ACUTE NON-RECURRENT FRONTAL SINUSITIS: ICD-10-CM

## 2025-03-14 RX ORDER — FLUTICASONE PROPIONATE 50 MCG
2 SPRAY, SUSPENSION (ML) NASAL DAILY
Qty: 16 ML | Refills: 1 | Status: SHIPPED | OUTPATIENT
Start: 2025-03-14 | End: 2030-06-19

## 2025-03-14 RX ORDER — LEVOTHYROXINE SODIUM 88 UG/1
88 TABLET ORAL DAILY
Qty: 90 TABLET | Refills: 1 | Status: SHIPPED | OUTPATIENT
Start: 2025-03-14

## 2025-04-10 DIAGNOSIS — I10 HTN (HYPERTENSION), BENIGN: ICD-10-CM

## 2025-04-11 RX ORDER — LISINOPRIL 40 MG/1
40 TABLET ORAL DAILY
Qty: 90 TABLET | Refills: 1 | Status: SHIPPED | OUTPATIENT
Start: 2025-04-11

## 2025-05-28 ENCOUNTER — OFFICE VISIT (OUTPATIENT)
Dept: INTERNAL MEDICINE CLINIC | Age: 60
End: 2025-05-28
Payer: COMMERCIAL

## 2025-05-28 VITALS
TEMPERATURE: 98.8 F | HEART RATE: 83 BPM | WEIGHT: 154 LBS | HEIGHT: 63 IN | OXYGEN SATURATION: 99 % | BODY MASS INDEX: 27.29 KG/M2 | SYSTOLIC BLOOD PRESSURE: 116 MMHG | DIASTOLIC BLOOD PRESSURE: 64 MMHG

## 2025-05-28 DIAGNOSIS — R53.83 LETHARGIC: ICD-10-CM

## 2025-05-28 DIAGNOSIS — I10 ESSENTIAL HYPERTENSION: Primary | ICD-10-CM

## 2025-05-28 DIAGNOSIS — R09.82 POST-NASAL DRIP: ICD-10-CM

## 2025-05-28 DIAGNOSIS — G43.009 MIGRAINE WITHOUT AURA AND WITHOUT STATUS MIGRAINOSUS, NOT INTRACTABLE: ICD-10-CM

## 2025-05-28 DIAGNOSIS — E06.3 HYPOTHYROIDISM DUE TO HASHIMOTO THYROIDITIS: ICD-10-CM

## 2025-05-28 DIAGNOSIS — J30.1 SEASONAL ALLERGIC RHINITIS DUE TO POLLEN: ICD-10-CM

## 2025-05-28 DIAGNOSIS — Z11.59 NEED FOR HEPATITIS C SCREENING TEST: ICD-10-CM

## 2025-05-28 DIAGNOSIS — J34.89 SINUS PAIN: ICD-10-CM

## 2025-05-28 DIAGNOSIS — N80.9 ENDOMETRIOSIS: ICD-10-CM

## 2025-05-28 DIAGNOSIS — F32.2 SEVERE MAJOR DEPRESSIVE DISORDER (HCC): ICD-10-CM

## 2025-05-28 DIAGNOSIS — R09.81 SINUS CONGESTION: ICD-10-CM

## 2025-05-28 DIAGNOSIS — J06.9 UPPER RESPIRATORY TRACT INFECTION, UNSPECIFIED TYPE: ICD-10-CM

## 2025-05-28 LAB
SARS-COV-2 AG UPPER RESP QL IA: NEGATIVE
SL AMB POCT RAPID FLU A: NORMAL
SL AMB POCT RAPID FLU B: NORMAL
VALID CONTROL: NORMAL

## 2025-05-28 PROCEDURE — 99214 OFFICE O/P EST MOD 30 MIN: CPT | Performed by: INTERNAL MEDICINE

## 2025-05-28 PROCEDURE — 87811 SARS-COV-2 COVID19 W/OPTIC: CPT | Performed by: INTERNAL MEDICINE

## 2025-05-28 PROCEDURE — 87804 INFLUENZA ASSAY W/OPTIC: CPT | Performed by: INTERNAL MEDICINE

## 2025-05-28 NOTE — ASSESSMENT & PLAN NOTE
Last TSH .16  pt. Has had hypothyroidism since her late 20's  Orders:    TSH, 3rd generation with Free T4 reflex; Future    Sedimentation rate, automated; Future

## 2025-05-28 NOTE — ASSESSMENT & PLAN NOTE
BP is slightly lower now on meds to consider lowering the dose will hold amlodipine for now and increase fluids. Check BP twice  a day increase fluids.  Orders:    CBC and differential; Future    TSH, 3rd generation with Free T4 reflex; Future    Comprehensive metabolic panel; Future    Sedimentation rate, automated; Future    Calcium, ionized; Future

## 2025-05-28 NOTE — ASSESSMENT & PLAN NOTE
Depression Screening Follow-up Plan: Patient's depression screening was positive with a PHQ-9 score of 6. Patient assessed for underlying major depression. They have no active suicidal ideations. Brief counseling provided and recommend additional follow-up/re-evaluation next office visit.       ? seasonal allergies- start Claritin and Flonase

## 2025-05-28 NOTE — ASSESSMENT & PLAN NOTE
Slightly worse - pressure and lightheaded- took a new med 2 weeks ago  Orders:    CBC and differential; Future    CT head wo contrast; Future    Sedimentation rate, automated; Future    Calcium, ionized; Future  acute fatigue syndome - 3 weeks

## 2025-05-28 NOTE — PROGRESS NOTES
Name: Dee Cristina      : 1965      MRN: [id number]  Encounter Provider: Andrew Marquez MD  Encounter Date: 2025   Encounter department: [facility]  :  Assessment & Plan  Essential hypertension  BP is slightly lower now on meds to consider lowering the dose will hold amlodipine for now and increase fluids. Check BP twice  a day increase fluids.  Orders:    CBC and differential; Future    TSH, 3rd generation with Free T4 reflex; Future    Comprehensive metabolic panel; Future    Sedimentation rate, automated; Future    Calcium, ionized; Future    Endometriosis  Not a problem at this time menopause in her late 30's       Hypothyroidism due to Hashimoto thyroiditis  Last TSH .16  pt. Has had hypothyroidism since her late   Orders:    TSH, 3rd generation with Free T4 reflex; Future    Sedimentation rate, automated; Future    Migraine without aura and without status migrainosus, not intractable  Slightly worse - pressure and lightheaded- took a new med 2 weeks ago  Orders:    CBC and differential; Future    CT head wo contrast; Future    Sedimentation rate, automated; Future    Calcium, ionized; Future  acute fatigue syndome - 3 weeks   Seasonal allergic rhinitis due to pollen  Difficult month  Orders:    CBC and differential; Future    POCT Rapid Covid Ag    POCT rapid flu A and B    Lethargic    Orders:    CBC and differential; Future    CT head wo contrast; Future    Sedimentation rate, automated; Future    POCT rapid flu A and B    Calcium, ionized; Future    Need for hepatitis C screening test    Orders:    Hepatitis C antibody; Future    Severe major depressive disorder (HCC)  Depression Screening Follow-up Plan: Patient's depression screening was positive with a PHQ-9 score of 6. Patient assessed for underlying major depression. They have no active suicidal ideations. Brief counseling provided and recommend additional follow-up/re-evaluation next office  visit.       ? seasonal allergies- start Claritin and Flonase         History of Present Illness   This is a case of a 60-year-old white female who has a 2 1/2 -week history of acute fatigue this is associated with tingling in her hands and a sense of lightheadedness.  Patient feels lethargic no exposures that she is aware of fever or chills patient had a new migraine medication weeks ago, the feet fatigue was before trying her new migraine medications.  Patient has been in relatively good health in the past she recently had a lid biopsy which showed seborrheic keratosis.  In March she had a full dermatological exam.  In March she had blood work done which showed cholesterol of 213 LDL of 123 her sodium 136 potassium 4.3 BUN 16 creatinine 0.94 GFR was 67 her TSH was 0.626 patient is on Synthroid 88 mcg a day.  Patient's other medications are lisinopril 40 mg Elavil 10 mg started 2 years ago for her migraines and Norvasc 2.5 mg..  The patient notes that her blood pressure is slightly lower than usual for her patient does not have joint pains she has no evidence of an infection and has had no exposure..  The patient has noted that as she has been depressed since the death of her mother which was in 2024 the patient's last mammogram was 2024 patient's colonoscope was some 5 years ago the patient is to have a repeat the patient states that her sleep is adequate and she has refreshing sleep she wakes up discernibly normal the the patient has noted a mild cognitive impairment recently over which she considers a brain overload because of her position and closing her mother's household.    Covid testing negative as well as flu test  Mother  of pancreatic cancer recently.  Pt. To hold elavil    reducing  slowly and Norvasc    Fatigue  This is a new problem. The current episode started 1 to 4 weeks ago. The problem occurs daily. The problem has been gradually worsening. Associated symptoms include  "arthralgias (knees), congestion and fatigue. Pertinent negatives include no abdominal pain, chest pain, coughing or nausea. The symptoms are aggravated by stress. The treatment provided mild relief.     Review of Systems   Constitutional:  Positive for activity change and fatigue.   HENT:  Positive for congestion, nosebleeds, postnasal drip, sinus pressure and sinus pain.         Tight for a couple weeks  Stopped flonase   Respiratory:  Negative for cough, choking, chest tightness, shortness of breath, wheezing and stridor.    Cardiovascular:  Negative for chest pain, palpitations and leg swelling.   Gastrointestinal:  Negative for abdominal pain, blood in stool, constipation, diarrhea and nausea.   Genitourinary:  Negative for difficulty urinating, frequency, menstrual problem (menapause at qe of 37), pelvic pain and vaginal bleeding.   Musculoskeletal:  Positive for arthralgias (knees).   Skin:         Lid Bx seborrhea dermatitides   Psychiatric/Behavioral:  Positive for dysphoric mood. The patient is nervous/anxious.        Objective   /64 (BP Location: Left arm, Patient Position: Sitting, Cuff Size: Standard)   Pulse 83   Temp 98.8 °F (37.1 °C) (Temporal)   Ht 5' 3\" (1.6 m)   Wt 69.9 kg (154 lb)   SpO2 99%   BMI 27.28 kg/m²      Physical Exam  Vitals and nursing note reviewed.   Constitutional:       Appearance: Normal appearance.   HENT:      Head: Normocephalic.      Right Ear: External ear normal.      Left Ear: External ear normal.      Nose: Nose normal. No congestion or rhinorrhea.     Eyes:      General: No scleral icterus.        Right eye: No discharge.         Left eye: No discharge.      Extraocular Movements: Extraocular movements intact.      Conjunctiva/sclera: Conjunctivae normal.      Pupils: Pupils are equal, round, and reactive to light.     Neck:      Vascular: No carotid bruit.     Cardiovascular:      Rate and Rhythm: Normal rate and regular rhythm.      Pulses: Normal pulses.    "   Heart sounds: Normal heart sounds. No murmur heard.     No friction rub. No gallop.   Pulmonary:      Effort: Pulmonary effort is normal. No respiratory distress.      Breath sounds: Normal breath sounds. No stridor. No wheezing or rhonchi.   Abdominal:      General: Abdomen is flat. There is no distension.      Palpations: Abdomen is soft. There is no mass.      Tenderness: There is no abdominal tenderness.     Musculoskeletal:         General: No swelling, tenderness, deformity or signs of injury.      Cervical back: Normal range of motion and neck supple. No rigidity or tenderness.   Lymphadenopathy:      Cervical: No cervical adenopathy.     Skin:     General: Skin is warm and dry.      Capillary Refill: Capillary refill takes less than 2 seconds.      Coloration: Skin is not jaundiced or pale.      Findings: No bruising or erythema.     Neurological:      General: No focal deficit present.      Mental Status: She is alert. Mental status is at baseline. She is disoriented.      Cranial Nerves: No cranial nerve deficit.      Sensory: No sensory deficit.      Motor: No weakness.      Coordination: Coordination normal.      Gait: Gait normal.      Deep Tendon Reflexes: Reflexes normal.     Psychiatric:         Mood and Affect: Mood normal.         Behavior: Behavior normal.         Thought Content: Thought content normal.         Judgment: Judgment normal.

## 2025-05-28 NOTE — ASSESSMENT & PLAN NOTE
Difficult month  Orders:    CBC and differential; Future    POCT Rapid Covid Ag    POCT rapid flu A and B

## 2025-05-29 ENCOUNTER — RESULTS FOLLOW-UP (OUTPATIENT)
Dept: INTERNAL MEDICINE CLINIC | Age: 60
End: 2025-05-29

## 2025-05-29 LAB
ALBUMIN SERPL-MCNC: 4.3 G/DL (ref 3.5–5.7)
ALP SERPL-CCNC: 94 U/L (ref 35–120)
ALT SERPL-CCNC: 20 U/L
ANION GAP SERPL CALCULATED.3IONS-SCNC: 8 MMOL/L (ref 3–11)
AST SERPL-CCNC: 16 U/L
BASOPHILS # BLD AUTO: 0.1 THOU/CMM (ref 0–0.1)
BASOPHILS NFR BLD AUTO: 1 %
BILIRUB SERPL-MCNC: 0.5 MG/DL (ref 0.2–1)
BUN SERPL-MCNC: 16 MG/DL (ref 7–25)
CA-I SERPL ISE-MCNC: 1.24 MMOL/L (ref 1.15–1.33)
CALCIUM SERPL-MCNC: 9 MG/DL (ref 8.5–10.5)
CHLORIDE SERPL-SCNC: 102 MMOL/L (ref 100–109)
CO2 SERPL-SCNC: 26 MMOL/L (ref 21–31)
CREAT SERPL-MCNC: 1.05 MG/DL (ref 0.4–1.1)
CYTOLOGY CMNT CVX/VAG CYTO-IMP: NORMAL
DIFFERENTIAL METHOD BLD: ABNORMAL
EOSINOPHIL # BLD AUTO: 0.2 THOU/CMM (ref 0–0.5)
EOSINOPHIL NFR BLD AUTO: 3 %
ERYTHROCYTE [DISTWIDTH] IN BLOOD BY AUTOMATED COUNT: 12.6 % (ref 12–16)
ERYTHROCYTE [SEDIMENTATION RATE] IN BLOOD BY WESTERGREN METHOD: 8 MM/HR (ref 0–30)
GFR/BSA.PRED SERPLBLD CYS-BASED-ARV: 61 ML/MIN/{1.73_M2}
GLUCOSE SERPL-MCNC: 96 MG/DL (ref 65–99)
HCT VFR BLD AUTO: 42.9 % (ref 35–43)
HCV AB SERPL QL IA: NONREACTIVE
HGB BLD-MCNC: 14.6 G/DL (ref 11.5–14.5)
LYMPHOCYTES # BLD AUTO: 1.9 THOU/CMM (ref 1–3)
LYMPHOCYTES NFR BLD AUTO: 32 %
MCH RBC QN AUTO: 30.2 PG (ref 26–34)
MCHC RBC AUTO-ENTMCNC: 34 G/DL (ref 32–37)
MCV RBC AUTO: 89 FL (ref 80–100)
MONOCYTES # BLD AUTO: 0.6 THOU/CMM (ref 0.3–1)
MONOCYTES NFR BLD AUTO: 10 %
NEUTROPHILS # BLD AUTO: 3.2 THOU/CMM (ref 1.8–7.8)
NEUTROPHILS NFR BLD AUTO: 54 %
PLATELET # BLD AUTO: 363 THOU/CMM (ref 140–350)
PMV BLD REES-ECKER: 7.6 FL (ref 7.5–11.3)
POTASSIUM SERPL-SCNC: 4.7 MMOL/L (ref 3.5–5.2)
PROT SERPL-MCNC: 6.7 G/DL (ref 6.3–8.3)
RBC # BLD AUTO: 4.83 MILL/CMM (ref 3.7–4.7)
SODIUM SERPL-SCNC: 136 MMOL/L (ref 135–145)
TSH SERPL-ACNC: 0.66 UIU/ML (ref 0.45–5.33)
WBC # BLD AUTO: 5.9 THOU/CMM (ref 4–10)

## 2025-05-30 ENCOUNTER — TELEPHONE (OUTPATIENT)
Age: 60
End: 2025-05-30

## 2025-05-30 NOTE — TELEPHONE ENCOUNTER
Patient saw Dr. Marquez on 5/28 and was given CT head wo contrast . She is scheduled for CT scan for 6/5. Please review results when available and call patient according and let her know if appointment is needed. I cancelled 6/4 follow up appointment for the patient. Dr. Marquez would like her to follow up with her PCP after CT scan.      Thank you!!

## 2025-06-05 ENCOUNTER — HOSPITAL ENCOUNTER (OUTPATIENT)
Dept: RADIOLOGY | Facility: IMAGING CENTER | Age: 60
End: 2025-06-05
Attending: INTERNAL MEDICINE
Payer: COMMERCIAL

## 2025-06-05 DIAGNOSIS — G43.009 MIGRAINE WITHOUT AURA AND WITHOUT STATUS MIGRAINOSUS, NOT INTRACTABLE: ICD-10-CM

## 2025-06-05 DIAGNOSIS — R53.83 LETHARGIC: ICD-10-CM

## 2025-06-05 PROCEDURE — 70450 CT HEAD/BRAIN W/O DYE: CPT

## 2025-06-18 ENCOUNTER — RESULTS FOLLOW-UP (OUTPATIENT)
Dept: INTERNAL MEDICINE CLINIC | Age: 60
End: 2025-06-18

## 2025-06-26 ENCOUNTER — TELEPHONE (OUTPATIENT)
Dept: INTERNAL MEDICINE CLINIC | Age: 60
End: 2025-06-26

## 2025-06-26 NOTE — TELEPHONE ENCOUNTER
Spoke with pt, she has been holding her amlodipine and monitoring BP at home in am - 140/94, 143/95, 147/93 and pm 105/70, 108/76  Pt reports her dizziness sx have resolved    Discussed CT findings: recommend MRI brain due to abnl   She had an MRI of the head 2023 showing similar findings   Pt reports her headaches and dizziness have mostly resolved     Pt has also cut down to amitriptyline to 10mg at bedtime but only 5 times / week and has been doing well with this   She was considering starting sertraline

## 2025-06-26 NOTE — TELEPHONE ENCOUNTER
Pt advised to make f/u with PCP to discuss medication for depression  For now advised to continue holding amlodipine as her dizziness has resolved and f/u in office and continue to monitor BP

## 2025-06-26 NOTE — TELEPHONE ENCOUNTER
Pt wondering about MRI recommendation and if she can see that information on [employer].      Appts for those two doctors are far out.   FYI didn't schedule the follow up.  Can we fit her in somewhere or different doctor.      She's not happy about not hearing back about results for that.    Can someone call her back.  Thanks.

## 2025-06-26 NOTE — TELEPHONE ENCOUNTER
Patient would like to discuss results. She wants to know what they mean.     Sharona, are you able to call the patient please?    No soon appointments available with either Dr. Kitchen or Dr. Marquez.

## 2025-07-09 DIAGNOSIS — E06.3 HYPOTHYROIDISM DUE TO HASHIMOTO'S THYROIDITIS: ICD-10-CM

## 2025-07-10 RX ORDER — LEVOTHYROXINE SODIUM 88 UG/1
88 TABLET ORAL DAILY
Qty: 90 TABLET | Refills: 0 | Status: SHIPPED | OUTPATIENT
Start: 2025-07-10